# Patient Record
Sex: FEMALE | Race: WHITE | HISPANIC OR LATINO
[De-identification: names, ages, dates, MRNs, and addresses within clinical notes are randomized per-mention and may not be internally consistent; named-entity substitution may affect disease eponyms.]

---

## 2019-05-02 ENCOUNTER — APPOINTMENT (OUTPATIENT)
Dept: GERIATRICS | Facility: CLINIC | Age: 79
End: 2019-05-02
Payer: MEDICARE

## 2019-05-02 VITALS
HEART RATE: 83 BPM | HEIGHT: 60 IN | BODY MASS INDEX: 38.48 KG/M2 | OXYGEN SATURATION: 96 % | RESPIRATION RATE: 15 BRPM | WEIGHT: 196 LBS | DIASTOLIC BLOOD PRESSURE: 78 MMHG | SYSTOLIC BLOOD PRESSURE: 140 MMHG | TEMPERATURE: 97.6 F

## 2019-05-02 DIAGNOSIS — Z87.891 PERSONAL HISTORY OF NICOTINE DEPENDENCE: ICD-10-CM

## 2019-05-02 DIAGNOSIS — Z85.038 PERSONAL HISTORY OF OTHER MALIGNANT NEOPLASM OF LARGE INTESTINE: ICD-10-CM

## 2019-05-02 DIAGNOSIS — I27.20 PULMONARY HYPERTENSION, UNSPECIFIED: ICD-10-CM

## 2019-05-02 DIAGNOSIS — Z63.4 DISAPPEARANCE AND DEATH OF FAMILY MEMBER: ICD-10-CM

## 2019-05-02 PROCEDURE — 99204 OFFICE O/P NEW MOD 45 MIN: CPT

## 2019-05-02 SDOH — SOCIAL STABILITY - SOCIAL INSECURITY: DISSAPEARANCE AND DEATH OF FAMILY MEMBER: Z63.4

## 2019-05-02 NOTE — SOCIAL HISTORY
[No falls in past year] : Patient reported no falls in the past year [Fully functional (bathing, dressing, toileting, transferring, walking, feeding)] : Fully functional (bathing, dressing, toileting, transferring, walking, feeding) [Fully functional (using the telephone, shopping, preparing meals, housekeeping, doing laundry, using transportation,] : Fully functional and needs no help or supervision to perform IADLs (using the telephone, shopping, preparing meals, housekeeping, doing laundry, using transportation, managing medications and managing finances) [Smoke Detector] : smoke detector [Seat Belt] :  uses seat belt

## 2019-05-02 NOTE — PHYSICAL EXAM
[General Appearance - Alert] : alert [General Appearance - Well Developed] : well developed [General Appearance - In No Acute Distress] : in no acute distress [General Appearance - Well Nourished] : well nourished [General Appearance - Well-Appearing] : healthy appearing [Sclera] : the sclera and conjunctiva were normal [Extraocular Movements] : extraocular movements were intact [PERRL With Normal Accommodation] : pupils were equal in size, round, and reactive to light [No Oral Pallor] : no oral pallor [Normal Oral Mucosa] : normal oral mucosa [Hearing Threshold Finger Rub Not [place]] : hearing was normal [No Oral Cyanosis] : no oral cyanosis [Outer Ear] : the ears and nose were normal in appearance [Nasal Cavity] : the nasal mucosa and septum were normal [Both Tympanic Membranes Were Examined] : both tympanic membranes were normal [Oropharynx] : The oropharynx was normal [Jugular Venous Distention Increased] : there was no jugular-venous distention [Neck Cervical Mass (___cm)] : no neck mass was observed [Auscultation Breath Sounds / Voice Sounds] : lungs were clear to auscultation bilaterally [Heart Sounds] : normal S1 and S2 [Full Pulse] : the pedal pulses are present [Edema] : there was no peripheral edema [Veins - Varicosity Changes] : there were no varicosital changes [Abdomen Soft] : soft [Bowel Sounds] : normal bowel sounds [Abdomen Tenderness] : non-tender [Abdomen Mass (___ Cm)] : no abdominal mass palpated [FreeTextEntry1] : midline incisions superior and inferior to umbilicus [Cervical Lymph Nodes Enlarged Posterior Bilaterally] : posterior cervical [Supraclavicular Lymph Nodes Enlarged Bilaterally] : supraclavicular [Cervical Lymph Nodes Enlarged Anterior Bilaterally] : anterior cervical [No CVA Tenderness] : no ~M costovertebral angle tenderness [Axillary Lymph Nodes Enlarged Bilaterally] : axillary [Motor Tone] : muscle strength and tone were normal [Involuntary Movements] : no involuntary movements were seen [No Spinal Tenderness] : no spinal tenderness [Right Foot Was Examined] : right foot was examined [] : no rash [Left Foot Was Examined] : left foot was examined [Normal Appearance] : not normal in appearance [Normal in Appearance] : not normal in appearance [Normal] : normal [Total Score ___ / 30] : the patient achieved a score of [unfilled] /30 [2+] : 2+ in the dorsalis pedis [Place ___ / 5] : place [unfilled] / 5 [Date / Time ___ / 5] : date / time [unfilled] / 5 [Registration ___ / 3] : registration [unfilled] / 3 [Serial Sevens ___/5] : serial sevens [unfilled] / 5 [Naming 2 Objects ___ / 2] : naming two objects [unfilled] / 2 [Repeating a Sentence ___ / 1] : repeating a sentence [unfilled] / 1 [Writing a Sentence ___ / 1] : write sentence [unfilled] / 1 [3-stage Verbal Command ___ / 3] : three-stage verbal command [unfilled] / 3 [Written Command ___ / 1] : written command [unfilled] / 1 [Copy a Design ___ / 1] : copy a design [unfilled] / 1 [Recall ___ / 3] : recall [unfilled] / 3 [Oriented To Time, Place, And Person] : oriented to person, place, and time

## 2019-05-02 NOTE — REVIEW OF SYSTEMS
[Feeling Poorly] : not feeling poorly [Fever] : no fever [Feeling Tired] : not feeling tired [Recent Weight Gain (___ Lbs)] : no recent weight gain [Recent Weight Loss (___ Lbs)] : no recent weight loss [Heart Rate Is Fast] : the heart rate was not fast [Heart Rate Is Slow] : the heart rate was not slow [Chest Pain] : no chest pain [Palpitations] : no palpitations [Suicidal] : not suicidal [Cough] : cough [Anxiety] : no anxiety [Sleep Disturbances] : no sleep disturbances [Depression] : depression [FreeTextEntry5] : negative stress test one year ago [FreeTextEntry6] : intermittent cough [Negative] : Heme/Lymph [de-identified] : Admits to feeling depressed at times [FreeTextEntry9] : Chronic back pain

## 2019-05-02 NOTE — HISTORY OF PRESENT ILLNESS
[FreeTextEntry1] : Mrs. Daya Simmons is a 78-year-old retired  presenting to [facility] care. She currently sees a cardiologist Dr. Polanco for primary care, although she denies a history of heart disease. The patient is  6 years. She lives in an apartment and about 3 years ago her daughter and adult grandchild moved in with her. The patient states her daughter has emotional problems and her presence is distressing.\par The patient is functionally independent, and drives a car. She has a history of diabetes, hypertension and hyperlipidemia. She had colon cancer surgery in 2017. She suffers from chronic back pain and is status post lumbar surgery approximately 20 years ago for presumed spinal stenosis. Her mobility is limited by back pain and although she does a lot of work around the home including cooking and cleaning, she admits to being inactive and not doing much walking outside the home.\par \par  [1] : 2) Feeling down, depressed, or hopeless for several days [0] : 1) Little interest or pleasure doing things: Not at all [PHQ-2 Score ___] : PHQ-2 Score [unfilled]

## 2019-05-03 LAB
ALBUMIN SERPL ELPH-MCNC: 4.5 G/DL
ALP BLD-CCNC: 81 U/L
ALT SERPL-CCNC: 8 U/L
ANION GAP SERPL CALC-SCNC: 11 MMOL/L
AST SERPL-CCNC: 13 U/L
BASOPHILS # BLD AUTO: 0.11 K/UL
BASOPHILS NFR BLD AUTO: 1.5 %
BILIRUB SERPL-MCNC: 0.2 MG/DL
BUN SERPL-MCNC: 37 MG/DL
CALCIUM SERPL-MCNC: 10.1 MG/DL
CHLORIDE SERPL-SCNC: 109 MMOL/L
CHOLEST SERPL-MCNC: 199 MG/DL
CHOLEST/HDLC SERPL: 3.4 RATIO
CO2 SERPL-SCNC: 23 MMOL/L
CREAT SERPL-MCNC: 1.36 MG/DL
CREAT SPEC-SCNC: 89 MG/DL
EOSINOPHIL # BLD AUTO: 0.16 K/UL
EOSINOPHIL NFR BLD AUTO: 2.2 %
ESTIMATED AVERAGE GLUCOSE: 148 MG/DL
FOLATE SERPL-MCNC: 4.3 NG/ML
GLUCOSE SERPL-MCNC: 111 MG/DL
HBA1C MFR BLD HPLC: 6.8 %
HCT VFR BLD CALC: 32.9 %
HDLC SERPL-MCNC: 59 MG/DL
HGB BLD-MCNC: 10.5 G/DL
IMM GRANULOCYTES NFR BLD AUTO: 0.1 %
IRON SATN MFR SERPL: 22 %
IRON SERPL-MCNC: 70 UG/DL
LDLC SERPL CALC-MCNC: 113 MG/DL
LYMPHOCYTES # BLD AUTO: 1.49 K/UL
LYMPHOCYTES NFR BLD AUTO: 20.8 %
MAN DIFF?: NORMAL
MCHC RBC-ENTMCNC: 29.8 PG
MCHC RBC-ENTMCNC: 31.9 GM/DL
MCV RBC AUTO: 93.5 FL
MICROALBUMIN 24H UR DL<=1MG/L-MCNC: 9.1 MG/DL
MICROALBUMIN/CREAT 24H UR-RTO: 102 MG/G
MONOCYTES # BLD AUTO: 0.56 K/UL
MONOCYTES NFR BLD AUTO: 7.8 %
NEUTROPHILS # BLD AUTO: 4.83 K/UL
NEUTROPHILS NFR BLD AUTO: 67.6 %
PLATELET # BLD AUTO: 358 K/UL
POTASSIUM SERPL-SCNC: 6.1 MMOL/L
PROT SERPL-MCNC: 7.4 G/DL
RBC # BLD: 3.52 M/UL
RBC # FLD: 12.5 %
SODIUM SERPL-SCNC: 143 MMOL/L
TIBC SERPL-MCNC: 318 UG/DL
TRIGL SERPL-MCNC: 135 MG/DL
UIBC SERPL-MCNC: 248 UG/DL
VIT B12 SERPL-MCNC: 555 PG/ML
WBC # FLD AUTO: 7.16 K/UL

## 2019-05-17 LAB
ANION GAP SERPL CALC-SCNC: 13 MMOL/L
BUN SERPL-MCNC: 36 MG/DL
CALCIUM SERPL-MCNC: 9.9 MG/DL
CHLORIDE SERPL-SCNC: 107 MMOL/L
CO2 SERPL-SCNC: 22 MMOL/L
CREAT SERPL-MCNC: 1.47 MG/DL
GLUCOSE SERPL-MCNC: 119 MG/DL
POTASSIUM SERPL-SCNC: 5.5 MMOL/L
SODIUM SERPL-SCNC: 141 MMOL/L

## 2019-05-28 ENCOUNTER — OUTPATIENT (OUTPATIENT)
Dept: OUTPATIENT SERVICES | Facility: HOSPITAL | Age: 79
LOS: 1 days | Discharge: HOME | End: 2019-05-28

## 2019-05-28 DIAGNOSIS — D64.9 ANEMIA, UNSPECIFIED: ICD-10-CM

## 2019-06-03 ENCOUNTER — RESULT REVIEW (OUTPATIENT)
Age: 79
End: 2019-06-03

## 2019-06-03 ENCOUNTER — APPOINTMENT (OUTPATIENT)
Dept: HEMATOLOGY ONCOLOGY | Facility: CLINIC | Age: 79
End: 2019-06-03
Payer: MEDICARE

## 2019-06-03 VITALS
HEIGHT: 59.88 IN | BODY MASS INDEX: 38.87 KG/M2 | WEIGHT: 197.98 LBS | SYSTOLIC BLOOD PRESSURE: 151 MMHG | OXYGEN SATURATION: 98 % | RESPIRATION RATE: 16 BRPM | DIASTOLIC BLOOD PRESSURE: 73 MMHG | TEMPERATURE: 97.4 F

## 2019-06-03 DIAGNOSIS — Z81.1 FAMILY HISTORY OF ALCOHOL ABUSE AND DEPENDENCE: ICD-10-CM

## 2019-06-03 LAB
ERYTHROCYTE [SEDIMENTATION RATE] IN BLOOD: 30 MM/HR — HIGH (ref 0–20)
HCT VFR BLD CALC: 29.2 % — LOW (ref 34.5–45)
HGB BLD-MCNC: 10.4 G/DL — LOW (ref 11.5–15.5)
MCHC RBC-ENTMCNC: 31.6 PG — SIGNIFICANT CHANGE UP (ref 27–34)
MCHC RBC-ENTMCNC: 35.6 G/DL — SIGNIFICANT CHANGE UP (ref 32–36)
MCV RBC AUTO: 88.8 FL — SIGNIFICANT CHANGE UP (ref 80–100)
PLATELET # BLD AUTO: 339 K/UL — SIGNIFICANT CHANGE UP (ref 150–400)
RBC # BLD: 3.29 M/UL — LOW (ref 3.8–5.2)
RBC # FLD: 11.2 % — SIGNIFICANT CHANGE UP (ref 10.3–14.5)
RETICS #: 37.1 K/UL — SIGNIFICANT CHANGE UP (ref 25–125)
RETICS/RBC NFR: 1.1 % — SIGNIFICANT CHANGE UP (ref 0.5–2.5)
WBC # BLD: 5.1 K/UL — SIGNIFICANT CHANGE UP (ref 3.8–10.5)
WBC # FLD AUTO: 5.1 K/UL — SIGNIFICANT CHANGE UP (ref 3.8–10.5)

## 2019-06-03 PROCEDURE — 99204 OFFICE O/P NEW MOD 45 MIN: CPT

## 2019-06-03 NOTE — REVIEW OF SYSTEMS
[Fatigue] : fatigue [SOB on Exertion] : shortness of breath during exertion [Negative] : Allergic/Immunologic [FreeTextEntry4] : allergies stuffy nose.   [FreeTextEntry6] : [place], can climb stairs.   [FreeTextEntry9] : Back pain.

## 2019-06-03 NOTE — ASSESSMENT
[FreeTextEntry1] : This is a 78 year old woman with a history of hypertension, diabetes, well controlled, hyperlipidemia. She presents for the evaluation of a normocytic anemia with Hg of 10.4g/dl.  This folic acid deficiency may be the cause, however will evaluate for other causes of anemia. CHeck LDH and haptoglobin r/o hemolysis, Check ESR, CRP, RF r/o inflammation. she has some amount of renal insufficiency, check erythropoietin.  \par Given the renal insufficiency r/o plasma cell dyscrasia check SPEP, AFSHIN, Free light chains.  \par She does have a history of a resected colon cancer, she does not recall the name of her oncologist and has not been seeing them for a while. She states that there was some discussion of a high cancer marker, check CA 19-9 and CEA.  \par Spent > 45 minutes in direct patient care and and addressed all questions and concerns.  >50% of this time was in direct face to face contact with patient during exam and counseling.

## 2019-06-03 NOTE — HISTORY OF PRESENT ILLNESS
[de-identified] : This is a 78 year old woman with a history of diabetes, hypertension and hyperlipidemia, she presents for the evaluation of a normocytic anemia Hg 10.5g/dl on most recent labwork. Normal iron and TIBC, and a low folate of 4.3g/dl.  Takes folic acid OTC daily already as per Dr. Bauer. \par Hx of colon cancer resected 2017.  Dr. Oneill 8/26/17 performed a colonoscopy partial colectomy.  No chemotherapy [id number].  \par Patient has been anemic for most of her life. Unclear how anemic, we have no records from prior to the May 2nd labwork from her primary.   Very tired.  \par Patient had a longstanding history of an anemia going back to her old primary.  Was never treated for this she states.   \par \par \par

## 2019-06-03 NOTE — REASON FOR VISIT
[Initial Consultation] : an initial consultation for [Blood Count Assessment] : blood count assessment [FreeTextEntry2] : Anemia

## 2019-06-05 ENCOUNTER — MESSAGE (OUTPATIENT)
Age: 79
End: 2019-06-05

## 2019-06-07 LAB
ALBUMIN MFR SERPL ELPH: 61.8 %
ALBUMIN SERPL-MCNC: 4.3 G/DL
ALBUMIN/GLOB SERPL: 1.7 RATIO
ALPHA1 GLOB MFR SERPL ELPH: 4.7 %
ALPHA1 GLOB SERPL ELPH-MCNC: 0.3 G/DL
ALPHA2 GLOB MFR SERPL ELPH: 10.6 %
ALPHA2 GLOB SERPL ELPH-MCNC: 0.7 G/DL
ANA SER IF-ACNC: NEGATIVE
B-GLOBULIN MFR SERPL ELPH: 11.8 %
B-GLOBULIN SERPL ELPH-MCNC: 0.8 G/DL
CANCER AG19-9 SERPL-ACNC: 46 U/ML
CEA SERPL-MCNC: 3.9 NG/ML
CRP SERPL-MCNC: 0.25 MG/DL
DEPRECATED KAPPA LC FREE/LAMBDA SER: 1.07 RATIO
EPO SERPL-MCNC: 11.4 MIU/ML
FERRITIN SERPL-MCNC: 127 NG/ML
GAMMA GLOB FLD ELPH-MCNC: 0.8 G/DL
GAMMA GLOB MFR SERPL ELPH: 11.1 %
HAPTOGLOB SERPL-MCNC: 141 MG/DL
INTERPRETATION SERPL IEP-IMP: NORMAL
IRON SATN MFR SERPL: 35 %
IRON SERPL-MCNC: 112 UG/DL
KAPPA LC CSF-MCNC: 2.89 MG/DL
KAPPA LC SERPL-MCNC: 3.1 MG/DL
LDH SERPL-CCNC: 154 U/L
M PROTEIN SPEC IFE-MCNC: NORMAL
PROT SERPL-MCNC: 6.9 G/DL
PROT SERPL-MCNC: 6.9 G/DL
RHEUMATOID FACT SER QL: <10 IU/ML
TIBC SERPL-MCNC: 318 UG/DL
UIBC SERPL-MCNC: 206 UG/DL

## 2019-06-17 ENCOUNTER — APPOINTMENT (OUTPATIENT)
Dept: HEMATOLOGY ONCOLOGY | Facility: CLINIC | Age: 79
End: 2019-06-17
Payer: MEDICARE

## 2019-06-17 VITALS
HEART RATE: 86 BPM | OXYGEN SATURATION: 99 % | RESPIRATION RATE: 16 BRPM | BODY MASS INDEX: 38.17 KG/M2 | SYSTOLIC BLOOD PRESSURE: 151 MMHG | DIASTOLIC BLOOD PRESSURE: 71 MMHG | TEMPERATURE: 98.8 F | WEIGHT: 194.67 LBS

## 2019-06-17 PROCEDURE — 99212 OFFICE O/P EST SF 10 MIN: CPT

## 2019-06-17 NOTE — HISTORY OF PRESENT ILLNESS
[de-identified] : Patient presents complaining of severe left hand pain.  Had this on and off for a while but got very bad last night.  She feels fine otherwise, denies faitgue or weakness.  \par On last visit we had conduced the anemia workup and most labs normal save for the oncgoing stable mild anemia Hg 10.4g/dl.  \par  [de-identified] : This is a 78 year old woman with a history of diabetes, hypertension and hyperlipidemia, she presents for the evaluation of a normocytic anemia Hg 10.5g/dl on most recent labwork. Normal iron and TIBC, and a low folate of 4.3g/dl.  Takes folic acid OTC daily already as per Dr. Bauer. \par Hx of colon cancer resected 2017.  Dr. Oneill 8/26/17 performed a colonoscopy partial colectomy.  No chemotherapy [id number].  \par \par \par \par

## 2019-06-17 NOTE — ASSESSMENT
[FreeTextEntry1] : This is a 78 year old woman with a history of hypertension, diabetes, well controlled, hyperlipidemia. She presents for the evaluation of a normocytic anemia with Hg of 10.4g/dl.  Workup was essentially normal, mild elevation in ESR but nothing specific, no evidence of plasma ell dyscrasia, severe inflammation, or iron deficiency.    Patient is on losartan that can occasionally cause a mild anemia, this may be the culprit, however she needs this medication right now so would not stop it.  Anemia remains stable and mild at 10.4g/dl, would follow over time, and in te future discuss a bone marrow biopsy or initiation of possible ANUP agents if the anemia worsens.

## 2019-06-20 ENCOUNTER — APPOINTMENT (OUTPATIENT)
Dept: OTOLARYNGOLOGY | Facility: CLINIC | Age: 79
End: 2019-06-20

## 2019-07-18 ENCOUNTER — APPOINTMENT (OUTPATIENT)
Dept: GERIATRICS | Facility: CLINIC | Age: 79
End: 2019-07-18
Payer: MEDICARE

## 2019-07-18 VITALS
HEIGHT: 55 IN | HEART RATE: 78 BPM | SYSTOLIC BLOOD PRESSURE: 150 MMHG | WEIGHT: 198 LBS | DIASTOLIC BLOOD PRESSURE: 70 MMHG | BODY MASS INDEX: 45.82 KG/M2 | OXYGEN SATURATION: 97 % | RESPIRATION RATE: 16 BRPM

## 2019-07-18 PROCEDURE — 99214 OFFICE O/P EST MOD 30 MIN: CPT

## 2019-07-18 NOTE — HISTORY OF PRESENT ILLNESS
[FreeTextEntry1] : Mrs. Simmons presents for follow up. On June 28 she saw nephrology who referred her to the [facility] ED with a potassium of 6.9. She had an overnight stay in the ED and was discharged with different medications which the patient did not bring with her. She was advised to followup with Dr. Young but declined f/u visit and requests other nephrologist. The patient feels otherwise well. She denies headaches, dizziness, palpitations, chest pain, shortness of breath, edema.\par Since her last visit she is also following with hematology for anemia. Mild elevations in CEA and CA 19-9 noted. She has refused CT scan as advised.\par

## 2019-07-18 NOTE — PHYSICAL EXAM
[General Appearance - Alert] : alert [General Appearance - In No Acute Distress] : in no acute distress [General Appearance - Well Nourished] : well nourished [General Appearance - Well Developed] : well developed [General Appearance - Well-Appearing] : healthy appearing [Sclera] : the sclera and conjunctiva were normal [PERRL With Normal Accommodation] : pupils were equal in size, round, and reactive to light [Normal Oral Mucosa] : normal oral mucosa [Neck Cervical Mass (___cm)] : no neck mass was observed [Jugular Venous Distention Increased] : there was no jugular-venous distention [Auscultation Breath Sounds / Voice Sounds] : lungs were clear to auscultation bilaterally [Heart Sounds] : normal S1 and S2 [Edema] : there was no peripheral edema [Bowel Sounds] : normal bowel sounds [Abdomen Soft] : soft [Abdomen Tenderness] : non-tender [Cervical Lymph Nodes Enlarged Posterior Bilaterally] : posterior cervical [Cervical Lymph Nodes Enlarged Anterior Bilaterally] : anterior cervical [Supraclavicular Lymph Nodes Enlarged Bilaterally] : supraclavicular [Axillary Lymph Nodes Enlarged Bilaterally] : axillary [Abnormal Walk] : normal gait [Involuntary Movements] : no involuntary movements were seen [] : no rash [No Focal Deficits] : no focal deficits

## 2019-07-19 LAB
ANION GAP SERPL CALC-SCNC: 13 MMOL/L
BUN SERPL-MCNC: 32 MG/DL
CALCIUM SERPL-MCNC: 9.7 MG/DL
CHLORIDE SERPL-SCNC: 108 MMOL/L
CHOLEST SERPL-MCNC: 144 MG/DL
CHOLEST/HDLC SERPL: 2.9 RATIO
CO2 SERPL-SCNC: 21 MMOL/L
CREAT SERPL-MCNC: 1.35 MG/DL
ESTIMATED AVERAGE GLUCOSE: 123 MG/DL
GLUCOSE SERPL-MCNC: 85 MG/DL
HBA1C MFR BLD HPLC: 5.9 %
HDLC SERPL-MCNC: 49 MG/DL
LDLC SERPL CALC-MCNC: 67 MG/DL
POTASSIUM SERPL-SCNC: 5.6 MMOL/L
SODIUM SERPL-SCNC: 142 MMOL/L
TRIGL SERPL-MCNC: 138 MG/DL

## 2019-08-01 ENCOUNTER — APPOINTMENT (OUTPATIENT)
Dept: GERIATRICS | Facility: CLINIC | Age: 79
End: 2019-08-01
Payer: MEDICARE

## 2019-08-01 VITALS
RESPIRATION RATE: 17 BRPM | DIASTOLIC BLOOD PRESSURE: 72 MMHG | OXYGEN SATURATION: 98 % | HEIGHT: 55 IN | BODY MASS INDEX: 45.68 KG/M2 | TEMPERATURE: 98.7 F | HEART RATE: 75 BPM | WEIGHT: 197.4 LBS | SYSTOLIC BLOOD PRESSURE: 156 MMHG

## 2019-08-01 PROCEDURE — 99214 OFFICE O/P EST MOD 30 MIN: CPT

## 2019-08-01 RX ORDER — GEMFIBROZIL 600 MG/1
600 TABLET, FILM COATED ORAL TWICE DAILY
Qty: 60 | Refills: 5 | Status: DISCONTINUED | COMMUNITY
Start: 2019-05-02 | End: 2019-08-01

## 2019-08-01 RX ORDER — ROSUVASTATIN CALCIUM 10 MG/1
10 TABLET, FILM COATED ORAL
Refills: 0 | Status: DISCONTINUED | COMMUNITY
Start: 2019-05-02 | End: 2019-08-01

## 2019-08-01 RX ORDER — LOSARTAN POTASSIUM AND HYDROCHLOROTHIAZIDE 12.5; 1 MG/1; MG/1
100-12.5 TABLET ORAL EVERY OTHER DAY
Qty: 15 | Refills: 3 | Status: DISCONTINUED | COMMUNITY
Start: 2019-05-02 | End: 2019-08-01

## 2019-08-01 NOTE — PHYSICAL EXAM
[General Appearance - Well-Appearing] : healthy appearing [General Appearance - In No Acute Distress] : in no acute distress [General Appearance - Alert] : alert [PERRL With Normal Accommodation] : pupils were equal in size, round, and reactive to light [Extraocular Movements] : extraocular movements were intact [Sclera] : the sclera and conjunctiva were normal [Neck Appearance] : the appearance of the neck was normal [Jugular Venous Distention Increased] : there was no jugular-venous distention [Heart Sounds] : normal S1 and S2 [Auscultation Breath Sounds / Voice Sounds] : lungs were clear to auscultation bilaterally [FreeTextEntry1] : trace left pretibial edema [Abdomen Soft] : soft [Bowel Sounds] : normal bowel sounds [Abdomen Tenderness] : non-tender [Cervical Lymph Nodes Enlarged Posterior Bilaterally] : posterior cervical [Supraclavicular Lymph Nodes Enlarged Bilaterally] : supraclavicular [Cervical Lymph Nodes Enlarged Anterior Bilaterally] : anterior cervical [Axillary Lymph Nodes Enlarged Bilaterally] : axillary [No CVA Tenderness] : no ~M costovertebral angle tenderness [No Spinal Tenderness] : no spinal tenderness [Nail Clubbing] : no clubbing  or cyanosis of the fingernails [Involuntary Movements] : no involuntary movements were seen [No Focal Deficits] : no focal deficits [] : no rash [Motor Tone] : muscle strength and tone were normal

## 2019-08-01 NOTE — HISTORY OF PRESENT ILLNESS
[FreeTextEntry1] : Mrs. who presents for followup. Overall she feels well. She has been experiencing chronic no back pain occasionally radiating down her right leg. She feels otherwise well. She has not been experiencing any lightheadedness/dizziness, excessive thirst or polyuria. She's been compliant with her blood pressure medications. Meds reconciled at today's visit.

## 2019-08-02 ENCOUNTER — APPOINTMENT (OUTPATIENT)
Dept: OTOLARYNGOLOGY | Facility: CLINIC | Age: 79
End: 2019-08-02

## 2019-08-28 ENCOUNTER — APPOINTMENT (OUTPATIENT)
Dept: OTOLARYNGOLOGY | Facility: CLINIC | Age: 79
End: 2019-08-28
Payer: MEDICARE

## 2019-08-28 VITALS
DIASTOLIC BLOOD PRESSURE: 75 MMHG | WEIGHT: 197 LBS | SYSTOLIC BLOOD PRESSURE: 154 MMHG | HEIGHT: 59 IN | BODY MASS INDEX: 39.72 KG/M2 | HEART RATE: 73 BPM

## 2019-08-28 DIAGNOSIS — R13.10 DYSPHAGIA, UNSPECIFIED: ICD-10-CM

## 2019-08-28 PROCEDURE — 31575 DIAGNOSTIC LARYNGOSCOPY: CPT

## 2019-08-28 PROCEDURE — 99204 OFFICE O/P NEW MOD 45 MIN: CPT | Mod: 25

## 2019-08-29 ENCOUNTER — APPOINTMENT (OUTPATIENT)
Dept: GERIATRICS | Facility: CLINIC | Age: 79
End: 2019-08-29
Payer: MEDICARE

## 2019-08-29 ENCOUNTER — NON-APPOINTMENT (OUTPATIENT)
Age: 79
End: 2019-08-29

## 2019-08-29 ENCOUNTER — APPOINTMENT (OUTPATIENT)
Dept: CARDIOLOGY | Facility: CLINIC | Age: 79
End: 2019-08-29
Payer: MEDICARE

## 2019-08-29 VITALS
HEART RATE: 76 BPM | TEMPERATURE: 97.8 F | HEIGHT: 59 IN | SYSTOLIC BLOOD PRESSURE: 130 MMHG | OXYGEN SATURATION: 98 % | WEIGHT: 193.6 LBS | DIASTOLIC BLOOD PRESSURE: 60 MMHG | BODY MASS INDEX: 39.03 KG/M2

## 2019-08-29 VITALS
WEIGHT: 194.38 LBS | RESPIRATION RATE: 14 BRPM | HEIGHT: 59 IN | DIASTOLIC BLOOD PRESSURE: 80 MMHG | OXYGEN SATURATION: 99 % | BODY MASS INDEX: 39.19 KG/M2 | SYSTOLIC BLOOD PRESSURE: 142 MMHG | HEART RATE: 70 BPM | TEMPERATURE: 98.1 F

## 2019-08-29 LAB
ANION GAP SERPL CALC-SCNC: 12 MMOL/L
BUN SERPL-MCNC: 23 MG/DL
CALCIUM SERPL-MCNC: 9.7 MG/DL
CHLORIDE SERPL-SCNC: 107 MMOL/L
CO2 SERPL-SCNC: 23 MMOL/L
CREAT SERPL-MCNC: 1.18 MG/DL
GLUCOSE SERPL-MCNC: 129 MG/DL
POTASSIUM SERPL-SCNC: 4.6 MMOL/L
SODIUM SERPL-SCNC: 142 MMOL/L

## 2019-08-29 PROCEDURE — 93000 ELECTROCARDIOGRAM COMPLETE: CPT

## 2019-08-29 PROCEDURE — 99214 OFFICE O/P EST MOD 30 MIN: CPT

## 2019-08-29 PROCEDURE — 99215 OFFICE O/P EST HI 40 MIN: CPT | Mod: GC,25

## 2019-08-29 RX ORDER — BENZONATATE 100 MG/1
100 CAPSULE ORAL 3 TIMES DAILY
Refills: 0 | Status: DISCONTINUED | COMMUNITY
Start: 2019-05-02 | End: 2019-08-29

## 2019-08-29 NOTE — HISTORY OF PRESENT ILLNESS
[FreeTextEntry1] : 78 year old female presents for f/u hypertension. \par \par Patient reports still being hypertensive at doctor's visit later; per VS documentation BP at otolaryngologist was 154/80. She reports she also knew her blood pressure was high because her chest "felt funny", denies chest pain or radiation of the sensation; she may have had palpitations?; denies associated diaphoresis, headache, dizziness, dyspnea. Reports having similar sensation of chest "feeling funny" two days ago as well. Yesterday, patient took an extra dose of prescribed norvasc 10mg dose for a total of norvasc 20mg due to her chest feeling funny. Denies checking blood pressures at home because blood pressure machine is broken. Reports she has had increased lower extremity edema since norvasc increase to 10mg at last visit. \par \par She reports having had "diarrhea" with about 1-3 bowel movements per day; she bought Tagamet OTC which has been helping. Denies fever/ chills, abdominal pain, dysuria, urinary urgency/ frequency. \par \par She reports having been told that glimperide is decreased to 1mg. \par Per patient, she had labs drawn this AM prior to coming to office today. \par Patient reports having followed with a nephrologist, but stopped following them as that nephrologist was not part of [facility]. Per patient, she has a new nephrologist with [facility] appointment scheduled for September 13th.\par Also reports difficulty with making appointment to have new oncologist at [facility]; as she does not want to follow with Dr. Caceres.

## 2019-08-29 NOTE — REVIEW OF SYSTEMS
[Feeling Tired] : feeling tired [As Noted in HPI] : as noted in HPI [Lower Ext Edema] : lower extremity edema [Diarrhea] : diarrhea [Negative] : Neurological [Fever] : no fever [Chills] : no chills [Abdominal Pain] : no abdominal pain [Vomiting] : no vomiting [FreeTextEntry4] : dysphagia and hoarseness. followed up with ENT yesterday [FreeTextEntry5] : chest feels funny

## 2019-08-29 NOTE — PHYSICAL EXAM
[General Appearance - Alert] : alert [General Appearance - In No Acute Distress] : in no acute distress [Sclera] : the sclera and conjunctiva were normal [Extraocular Movements] : extraocular movements were intact [Normal Oral Mucosa] : normal oral mucosa [Outer Ear] : the ears and nose were normal in appearance [Hearing Threshold Finger Rub Not [place]] : hearing was normal [Neck Appearance] : the appearance of the neck was normal [Jugular Venous Distention Increased] : there was no jugular-venous distention [Respiration, Rhythm And Depth] : normal respiratory rhythm and effort [Exaggerated Use Of Accessory Muscles For Inspiration] : no accessory muscle use [Auscultation Breath Sounds / Voice Sounds] : lungs were clear to auscultation bilaterally [Heart Rate And Rhythm] : heart rate was normal and rhythm regular [Heart Sounds] : normal S1 and S2 [Bowel Sounds] : normal bowel sounds [Abdomen Soft] : soft [Abdomen Tenderness] : non-tender [Cervical Lymph Nodes Enlarged Posterior Bilaterally] : posterior cervical [Cervical Lymph Nodes Enlarged Anterior Bilaterally] : anterior cervical [No CVA Tenderness] : no ~M costovertebral angle tenderness [No Spinal Tenderness] : no spinal tenderness [Abnormal Walk] : normal gait [Nail Clubbing] : no clubbing  or cyanosis of the fingernails [Involuntary Movements] : no involuntary movements were seen [Musculoskeletal - Swelling] : no joint swelling seen [Motor Tone] : muscle strength and tone were normal [Cranial Nerves] : cranial nerves 2-12 were intact [No Focal Deficits] : no focal deficits [] : no rash [FreeTextEntry1] : trace to slight pitting edema in b/l lower extremities

## 2019-08-29 NOTE — ADDENDUM
[FreeTextEntry1] : Mrs. Simmons was seen with Dr. Esteban, geriatric fellow. Detailed history, exam and plan as per fellow's note. Briefly this is a 78-year-old diabetic hypertensive woman presenting for urgent followup for hypertension. For the past 2 days she's been experiencing a "funny" sensation in her chest and yesterday took additional dose of Norvasc. The patient is not clear about the onset or timing of the symptoms but thinks that she may have had associated palpitations but does not describe chest pain, diaphoresis or shortness of breath. Electrocardiogram reveals poor R-wave progression V1 V2 with inverted T waves, changed from old prior EKG. This patient has multiple risk factors for heart disease including hypertension, diabetes and hyperlipidemia, and I have asked cardiology to evaluate her this morning. The patient has a followup appointment with me next week I have asked her to come back and see me so that we may address remaining issues.\par I have also emphasized in great detail the need for her to followup with nephrology and heme-onc.

## 2019-08-30 ENCOUNTER — APPOINTMENT (OUTPATIENT)
Dept: CARDIOLOGY | Facility: CLINIC | Age: 79
End: 2019-08-30
Payer: MEDICARE

## 2019-08-30 PROBLEM — R13.10 DIFFICULTY SWALLOWING: Status: ACTIVE | Noted: 2019-05-02

## 2019-08-30 PROCEDURE — 93306 TTE W/DOPPLER COMPLETE: CPT

## 2019-08-30 PROCEDURE — 78452 HT MUSCLE IMAGE SPECT MULT: CPT

## 2019-08-30 PROCEDURE — A9500: CPT

## 2019-08-30 PROCEDURE — 93015 CV STRESS TEST SUPVJ I&R: CPT

## 2019-08-30 NOTE — PROCEDURE
[de-identified] : Afrin 0.05% and lidocaine 4% sprayed into both nasal passages. Flexible scope #2 used. Passed through nasal passage and nasopharynx/oropharynx/hypopharynx clear. Supraglottis normal. Glottis with fully mobile vocal cords without lesions or masses. Visualized subglottis clear. Postcricoid area without erythema or edema. No pooling of secretions.

## 2019-08-30 NOTE — HISTORY OF PRESENT ILLNESS
[de-identified] : 77yo female with difficulty swallowing for about a year. She notes that about 1-2 weeks she feels it gets stuck in her chest. She notes that drinking water will push it down. She denies vomiting or regurgitating food. She also notes intermittent hoarseness. She notes no heartburn. Hx of colon cancer 10/2017 treated with surgery and no adjuvant therapy. She is clear as far as she knows. She has had colonoscopy but no EGD.

## 2019-08-30 NOTE — CONSULT LETTER
[Dear  ___] : Dear  [unfilled], [Consult Letter:] : I had the pleasure of evaluating your patient, [unfilled]. [Please see my note below.] : Please see my note below. [Consult Closing:] : Thank you very much for allowing me to participate in the care of this patient.  If you have any questions, please do not hesitate to contact me. [FreeTextEntry3] : Elicia Hollis MD\par Otolaryngology and Cranial Base Surgery\par Attending Physician - Department of Otolaryngology and Head & Neck Surgery\par [facility]\par \par [employer] [facility]  [Sincerely,] : Sincerely,

## 2019-08-30 NOTE — ASSESSMENT
[FreeTextEntry1] : dysphagia:\par - normal laryngoscopy and she notes sensation of food getting stuck in chest so suspect thoracic esophageal issue so will plan for barium esophagram\par - even if normal will plan for referral to GI for consideration of upper endoscopy\par - will call with esophagram results

## 2019-09-03 ENCOUNTER — APPOINTMENT (OUTPATIENT)
Dept: GERIATRICS | Facility: CLINIC | Age: 79
End: 2019-09-03
Payer: MEDICARE

## 2019-09-03 VITALS — SYSTOLIC BLOOD PRESSURE: 128 MMHG | DIASTOLIC BLOOD PRESSURE: 80 MMHG

## 2019-09-03 VITALS
SYSTOLIC BLOOD PRESSURE: 122 MMHG | HEIGHT: 59 IN | HEART RATE: 76 BPM | BODY MASS INDEX: 39.51 KG/M2 | TEMPERATURE: 98.6 F | WEIGHT: 196 LBS | RESPIRATION RATE: 15 BRPM | DIASTOLIC BLOOD PRESSURE: 70 MMHG | OXYGEN SATURATION: 96 %

## 2019-09-03 PROCEDURE — 99214 OFFICE O/P EST MOD 30 MIN: CPT

## 2019-09-03 NOTE — REVIEW OF SYSTEMS
[Lower Ext Edema] : lower extremity edema [Negative] : Heme/Lymph [Leg Claudication] : no intermittent leg claudication [Arthralgias] : no arthralgias [Joint Pain] : no joint pain [Joint Swelling] : no joint swelling [Joint Stiffness] : no joint stiffness [Suicidal] : not suicidal [Sleep Disturbances] : no sleep disturbances

## 2019-09-03 NOTE — PHYSICAL EXAM
[General Appearance - In No Acute Distress] : in no acute distress [General Appearance - Alert] : alert [General Appearance - Well Nourished] : well nourished [PERRL With Normal Accommodation] : pupils were equal in size, round, and reactive to light [General Appearance - Well Developed] : well developed [Jugular Venous Distention Increased] : there was no jugular-venous distention [Auscultation Breath Sounds / Voice Sounds] : lungs were clear to auscultation bilaterally [Heart Rate And Rhythm] : heart rate was normal and rhythm regular [Heart Sounds] : normal S1 and S2 [Full Pulse] : the pedal pulses are present [Abdomen Soft] : soft [Abdomen Tenderness] : non-tender [Cervical Lymph Nodes Enlarged Posterior Bilaterally] : posterior cervical [Axillary Lymph Nodes Enlarged Bilaterally] : axillary [Cervical Lymph Nodes Enlarged Anterior Bilaterally] : anterior cervical [Supraclavicular Lymph Nodes Enlarged Bilaterally] : supraclavicular [No CVA Tenderness] : no ~M costovertebral angle tenderness [No Spinal Tenderness] : no spinal tenderness [Involuntary Movements] : no involuntary movements were seen [] : no rash [No Focal Deficits] : no focal deficits [FreeTextEntry1] : Trace pretibial edema bilaterally.

## 2019-09-03 NOTE — HISTORY OF PRESENT ILLNESS
[FreeTextEntry1] : Mrs. Simmons presents for followup of hypertension.She underwent stress testing last week, detailed results pending. She has not experienced anymore palpitations or funny sensation in her chest. Only complaint is swelling in her feet which tends to worsen towards the end of the day. She denies shortness of breath. She admits to using salt at home.\par The patient admits to leg pain. She has a history of spinal stenosis and is receiving outpatient physical therapy. She sees a neurologist Dr. Rogel and had been multilevel MRI performed in the past year. Results are in the chart.

## 2019-09-13 ENCOUNTER — APPOINTMENT (OUTPATIENT)
Dept: NEPHROLOGY | Facility: CLINIC | Age: 79
End: 2019-09-13
Payer: MEDICARE

## 2019-09-13 VITALS
SYSTOLIC BLOOD PRESSURE: 154 MMHG | DIASTOLIC BLOOD PRESSURE: 73 MMHG | WEIGHT: 189.59 LBS | OXYGEN SATURATION: 97 % | HEART RATE: 75 BPM | BODY MASS INDEX: 38.22 KG/M2 | HEIGHT: 59 IN

## 2019-09-13 PROCEDURE — 99204 OFFICE O/P NEW MOD 45 MIN: CPT

## 2019-09-13 RX ORDER — GLIMEPIRIDE 1 MG/1
1 TABLET ORAL DAILY
Qty: 90 | Refills: 3 | Status: DISCONTINUED | COMMUNITY
Start: 2019-05-02 | End: 2019-09-13

## 2019-09-13 NOTE — HISTORY OF PRESENT ILLNESS
[FreeTextEntry1] : This is a 78 year old with hypertension, type 2 diabetes (HgbA1c 5.9%), CKD (in May Cr was 1.47, 1.35 in July), hyperlipidemia, and colon adenocarcinoma (2017) who presents to establish care for CKD. Patient was recently admitted to [facility] in June 2019 after being sent in by her nephrologist (Dr. Kim of [facility]) for hyperkalemia to 6.9. She was initially referred to Dr. Kim by her PCP Dr. Bauer for elevated potassium to 5.6 in July. She remained admitted in the hospital for 1 day and repeat bloodwork on August 29th revealed normal K (4.6). Patient has been on ARB in the past which resulted in increase in Cr and ARB was subsequently discontinued.\par \par Blood pressure is elevated to 154/73. Patient took her BP medication last night. Patient occasionally checks her BPs at home and systolic BPs are typically in 130s. Patient reports bilateral lower extremity edema. She was increased to 10 mg amlodipine in May and believes her leg swelling worsened since then. She endorses prior to increasing amlodipine her swelling was primarily limited to her left leg. She denies excessive salt in her diet. She reports some increase in exercise and some loss of weight.\par \par Today, she endorses diarrhea for the last 2-3 weeks. She endorses 3-4 episodes of loose stools per day which sometimes wakes her at night. She endorses abdominal discomfort with improvement after bowel movement. She denies blood in her stool, no recent travel, no recent sick contacts.

## 2019-09-13 NOTE — PHYSICAL EXAM
[General Appearance - Alert] : alert [Sclera] : the sclera and conjunctiva were normal [General Appearance - In No Acute Distress] : in no acute distress [Extraocular Movements] : extraocular movements were intact [PERRL With Normal Accommodation] : pupils were equal in size, round, and reactive to light [Neck Cervical Mass (___cm)] : no neck mass was observed [Neck Appearance] : the appearance of the neck was normal [Jugular Venous Distention Increased] : there was no jugular-venous distention [Auscultation Breath Sounds / Voice Sounds] : lungs were clear to auscultation bilaterally [Heart Sounds] : normal S1 and S2 [Heart Rate And Rhythm] : heart rate was normal and rhythm regular [Heart Sounds Gallop] : no gallops [Murmurs] : no murmurs [Pitting Edema] : pitting edema present [Heart Sounds Pericardial Friction Rub] : no pericardial rub [___ +] : bilateral [unfilled]+ pretibial pitting edema [Abdomen Soft] : soft [Bowel Sounds] : normal bowel sounds [Abdomen Tenderness] : non-tender [Abdomen Mass (___ Cm)] : no abdominal mass palpated [Antalgic Gait Bilateral] : antalgic bilaterally [Skin Color & Pigmentation] : normal skin color and pigmentation [Skin Turgor] : normal skin turgor [] : no rash [Oriented To Time, Place, And Person] : oriented to person, place, and time [Impaired Insight] : insight and judgment were intact [Affect] : the affect was normal

## 2019-09-13 NOTE — ASSESSMENT
[FreeTextEntry1] : 78 year old with hypertension, type 2 diabetes (HgbA1c 5.9%), CKD stage 3 (in May Cr was 1.47, 1.35 in July), hyperlipidemia, and colon adenocarcinoma (2017) who presents for initial evaluation of CKD and management of hyperkalemia.\par \par CKD stage 3 - patient with baseline Cr 1.3-1.4 this past year likely secondary to T2DM and hypertension. Past testing reveals mild proteinuria. Recommend starting 10mg lisinopril/25 mg chlorthalidone for long term kidney protection ( will stop amlodipine) Check urinalysis and repeat blood work today. Recommend renal ultrasound.\par \par Hyperkalemia - last bloodwork in August 2019 with normal potassium levels. Prior to hospitalization, patient found to have hyperkalemia to 6.9. Will repeat blood work today. will start low dose ACEI in combination with a diuretic and if need be, she can be started on Lokelma 10 mg BID for K binding. \par \par Lower extremity edema - encouraged low salt diet and continued exercise. Amlodipine likely contributing to YANG as well. Recommend the above changes.\par \par discussed with Dr. Bauer\par \par Hypertension - currently elevated to 154/73, however patient endorses systolic BPs 130s at home. Recommend discontinuing amlodipine and starting 10 mg lisinopril/20 mg chlorthalidone for BP control. Encouraged continued exercise and low salt diet.\par \par T2DM - currently well controlled on metformin and glimepiride. Last HgbA1c decreased to 5.9%. Continue management per Dr. Bauer.\par \par HLD - currently well controlled, on rosuvastatin with LDL 67, Total cholesterol 144.

## 2019-09-26 ENCOUNTER — RX RENEWAL (OUTPATIENT)
Age: 79
End: 2019-09-26

## 2019-10-15 ENCOUNTER — OUTPATIENT (OUTPATIENT)
Dept: OUTPATIENT SERVICES | Facility: HOSPITAL | Age: 79
LOS: 1 days | Discharge: HOME | End: 2019-10-15

## 2019-10-15 DIAGNOSIS — D64.9 ANEMIA, UNSPECIFIED: ICD-10-CM

## 2019-10-17 ENCOUNTER — APPOINTMENT (OUTPATIENT)
Dept: HEMATOLOGY ONCOLOGY | Facility: CLINIC | Age: 79
End: 2019-10-17

## 2019-10-22 ENCOUNTER — FORM ENCOUNTER (OUTPATIENT)
Age: 79
End: 2019-10-22

## 2019-10-22 ENCOUNTER — APPOINTMENT (OUTPATIENT)
Dept: GERIATRICS | Facility: CLINIC | Age: 79
End: 2019-10-22
Payer: MEDICARE

## 2019-10-22 VITALS
DIASTOLIC BLOOD PRESSURE: 70 MMHG | TEMPERATURE: 98.2 F | HEART RATE: 81 BPM | HEIGHT: 59 IN | RESPIRATION RATE: 16 BRPM | OXYGEN SATURATION: 99 % | BODY MASS INDEX: 38.78 KG/M2 | SYSTOLIC BLOOD PRESSURE: 136 MMHG | WEIGHT: 192.38 LBS

## 2019-10-22 PROCEDURE — 90670 PCV13 VACCINE IM: CPT

## 2019-10-22 PROCEDURE — G0008: CPT

## 2019-10-22 PROCEDURE — G0009: CPT

## 2019-10-22 PROCEDURE — 90662 IIV NO PRSV INCREASED AG IM: CPT

## 2019-10-22 PROCEDURE — 99214 OFFICE O/P EST MOD 30 MIN: CPT | Mod: 25

## 2019-10-22 NOTE — HISTORY OF PRESENT ILLNESS
[FreeTextEntry1] : Mrs. Simmons is a 79-year-old diabetic hypertensive woman presenting for follow up. The patient feels well. She does not complain of any difficulty breathing, chest pain, palpitations, leg swelling. Her main complaint is discomfort she has had on and off for quite some time. She states that she cannot sleep on her right side. She denies any associated numbness/paresthesias or weakness.\par Patient denies any recent falls. She remains functionally independent.

## 2019-10-22 NOTE — PHYSICAL EXAM
[General Appearance - Alert] : alert [General Appearance - In No Acute Distress] : in no acute distress [General Appearance - Well Nourished] : well nourished [General Appearance - Well Developed] : well developed [General Appearance - Well-Appearing] : healthy appearing [Sclera] : the sclera and conjunctiva were normal [PERRL With Normal Accommodation] : pupils were equal in size, round, and reactive to light [Strabismus] : no strabismus was seen [Extraocular Movements] : extraocular movements were intact [Outer Ear] : the ears and nose were normal in appearance [Normal Oral Mucosa] : normal oral mucosa [Neck Cervical Mass (___cm)] : no neck mass was observed [Jugular Venous Distention Increased] : there was no jugular-venous distention [Auscultation Breath Sounds / Voice Sounds] : lungs were clear to auscultation bilaterally [Heart Sounds] : normal S1 and S2 [Abdomen Soft] : soft [Abdomen Tenderness] : non-tender [Abdomen Mass (___ Cm)] : no abdominal mass palpated [Cervical Lymph Nodes Enlarged Posterior Bilaterally] : posterior cervical [Supraclavicular Lymph Nodes Enlarged Bilaterally] : supraclavicular [Cervical Lymph Nodes Enlarged Anterior Bilaterally] : anterior cervical [Axillary Lymph Nodes Enlarged Bilaterally] : axillary [No Focal Deficits] : no focal deficits [] : no rash [FreeTextEntry1] : moderate palp tenderness right a.c.joint

## 2019-10-22 NOTE — REVIEW OF SYSTEMS
[Dysuria] : no dysuria [Incontinence] : no incontinence [Joint Pain] : joint pain [Negative] : Heme/Lymph [FreeTextEntry9] : right shoulder pain

## 2019-10-23 ENCOUNTER — OUTPATIENT (OUTPATIENT)
Dept: OUTPATIENT SERVICES | Facility: HOSPITAL | Age: 79
LOS: 1 days | End: 2019-10-23
Payer: MEDICARE

## 2019-10-23 ENCOUNTER — APPOINTMENT (OUTPATIENT)
Dept: ULTRASOUND IMAGING | Facility: IMAGING CENTER | Age: 79
End: 2019-10-23
Payer: MEDICARE

## 2019-10-23 DIAGNOSIS — N18.3 CHRONIC KIDNEY DISEASE, STAGE 3 (MODERATE): ICD-10-CM

## 2019-10-23 PROCEDURE — 76770 US EXAM ABDO BACK WALL COMP: CPT | Mod: 26

## 2019-10-23 PROCEDURE — 76770 US EXAM ABDO BACK WALL COMP: CPT

## 2019-11-01 ENCOUNTER — APPOINTMENT (OUTPATIENT)
Dept: NEPHROLOGY | Facility: CLINIC | Age: 79
End: 2019-11-01
Payer: MEDICARE

## 2019-11-01 VITALS
DIASTOLIC BLOOD PRESSURE: 59 MMHG | OXYGEN SATURATION: 98 % | BODY MASS INDEX: 37.85 KG/M2 | HEART RATE: 74 BPM | WEIGHT: 187.39 LBS | SYSTOLIC BLOOD PRESSURE: 142 MMHG

## 2019-11-01 PROCEDURE — 99215 OFFICE O/P EST HI 40 MIN: CPT

## 2019-11-20 ENCOUNTER — APPOINTMENT (OUTPATIENT)
Dept: GERIATRICS | Facility: CLINIC | Age: 79
End: 2019-11-20
Payer: MEDICARE

## 2019-11-20 VITALS
BODY MASS INDEX: 38.38 KG/M2 | DIASTOLIC BLOOD PRESSURE: 60 MMHG | SYSTOLIC BLOOD PRESSURE: 142 MMHG | TEMPERATURE: 97.9 F | OXYGEN SATURATION: 98 % | HEART RATE: 75 BPM | RESPIRATION RATE: 16 BRPM | WEIGHT: 190.4 LBS | HEIGHT: 59 IN

## 2019-11-20 PROCEDURE — 99214 OFFICE O/P EST MOD 30 MIN: CPT

## 2019-11-20 NOTE — PHYSICAL EXAM
[General Appearance - Alert] : alert [General Appearance - In No Acute Distress] : in no acute distress [General Appearance - Well Nourished] : well nourished [General Appearance - Well-Appearing] : healthy appearing [General Appearance - Well Developed] : well developed [Sclera] : the sclera and conjunctiva were normal [Extraocular Movements] : extraocular movements were intact [Strabismus] : no strabismus was seen [PERRL With Normal Accommodation] : pupils were equal in size, round, and reactive to light [Normal Oral Mucosa] : normal oral mucosa [No Oral Pallor] : no oral pallor [No Oral Cyanosis] : no oral cyanosis [Nasal Cavity] : the nasal mucosa and septum were normal [Oropharynx] : The oropharynx was normal [Neck Cervical Mass (___cm)] : no neck mass was observed [Jugular Venous Distention Increased] : there was no jugular-venous distention [Thyroid Diffuse Enlargement] : the thyroid was not enlarged [Thyroid Nodule] : there were no palpable thyroid nodules [Respiration, Rhythm And Depth] : normal respiratory rhythm and effort [Exaggerated Use Of Accessory Muscles For Inspiration] : no accessory muscle use [Auscultation Breath Sounds / Voice Sounds] : lungs were clear to auscultation bilaterally [Heart Sounds] : normal S1 and S2 [Cervical Lymph Nodes Enlarged Posterior Bilaterally] : posterior cervical [Supraclavicular Lymph Nodes Enlarged Bilaterally] : supraclavicular [Cervical Lymph Nodes Enlarged Anterior Bilaterally] : anterior cervical [Axillary Lymph Nodes Enlarged Bilaterally] : axillary [No CVA Tenderness] : no ~M costovertebral angle tenderness [Involuntary Movements] : no involuntary movements were seen [No Spinal Tenderness] : no spinal tenderness [Motor Tone] : muscle strength and tone were normal [No Focal Deficits] : no focal deficits [FreeTextEntry1] : tr-1+ ankle edema [Abdomen Soft] : soft [Abdomen Tenderness] : non-tender [] : no hepato-splenomegaly

## 2019-11-20 NOTE — HISTORY OF PRESENT ILLNESS
[FreeTextEntry1] : Mrs. Simmons presents for followup. She states that every year about this time she develops a persistent dry cough, worse at night. She denies fever, chest discomfort, sputum production, shortness of breath. She has seen a pulmonologist a few times in the past and has had pulmonary function studies but was never told she had asthma or prescribed an inhaler. She feels otherwise well. She notes some edema in her legs which she states worsens as the day progresses. She stopped chlorthalidone a few weeks ago because "it wasn't doing anything"\par She has seen ENT for complaint of dysfunction in the past but has not performed imaging study is recommended. She is scheduled to see GI Dr Wilkerson for colon cancer followup and possible EGD. She will also be following up with him on Dr. Gtz who she had seen in the past. Note long history of chronic back pain. Patient states the pain is severe at times and has used Vicodin infrequently in the past and asking for a prescription. She states that she only uses with severe pain and mostly at night to help her sleep.\par

## 2019-11-20 NOTE — REVIEW OF SYSTEMS
[Lower Ext Edema] : lower extremity edema [As Noted in HPI] : as noted in HPI [Negative] : Heme/Lymph [Fever] : no fever [Feeling Poorly] : not feeling poorly [Recent Weight Gain (___ Lbs)] : no recent weight gain [Feeling Tired] : not feeling tired [Recent Weight Loss (___ Lbs)] : no recent weight loss [Shortness Of Breath] : no shortness of breath [Wheezing] : no wheezing [Orthopnea] : no orthopnea [SOB on Exertion] : no shortness of breath during exertion [PND] : no PND

## 2020-01-02 ENCOUNTER — MEDICATION RENEWAL (OUTPATIENT)
Age: 80
End: 2020-01-02

## 2020-01-08 ENCOUNTER — MEDICATION RENEWAL (OUTPATIENT)
Age: 80
End: 2020-01-08

## 2020-01-21 ENCOUNTER — APPOINTMENT (OUTPATIENT)
Dept: GERIATRICS | Facility: CLINIC | Age: 80
End: 2020-01-21
Payer: MEDICARE

## 2020-01-21 VITALS
WEIGHT: 192 LBS | RESPIRATION RATE: 16 BRPM | OXYGEN SATURATION: 98 % | HEART RATE: 80 BPM | TEMPERATURE: 98.1 F | HEIGHT: 59 IN | SYSTOLIC BLOOD PRESSURE: 136 MMHG | BODY MASS INDEX: 38.71 KG/M2 | DIASTOLIC BLOOD PRESSURE: 60 MMHG

## 2020-01-21 PROCEDURE — 99214 OFFICE O/P EST MOD 30 MIN: CPT

## 2020-01-21 NOTE — HISTORY OF PRESENT ILLNESS
[FreeTextEntry1] : Mrs. Simmons presents for followup of multiple medical problems. The patient feels achy but overall well. Patient has chronic low back pain with radiation RLE>LLE, unchanged. She's using only Tylenol daily and rarely Tylenol with Codeine. Does not wish to add any other meds to her regimen. Chronic dry cough seems better and using Tessalon sparingly. Also notes ankle edema which tends to worsen over the course of the day. No complaints of chest pain, palpitations or shortness of breath with modest effort. Saw Dr. Gtz-hematology oncology-earlier this month, we'll lab and consult requested and in chart. K4.6/creatinine 1.2/hemoglobin 9.3. Scheduled for EGD and colonoscopy by Dr. Wilkerson next month. Denies change in bowel or bladder habits. Drinks adequate amounts of water. Remains functionally independent.

## 2020-01-21 NOTE — PHYSICAL EXAM
[General Appearance - In No Acute Distress] : in no acute distress [General Appearance - Alert] : alert [General Appearance - Well-Appearing] : healthy appearing [Sclera] : the sclera and conjunctiva were normal [PERRL With Normal Accommodation] : pupils were equal in size, round, and reactive to light [Examination Of The Oral Cavity] : the lips and gums were normal [Normal Oral Mucosa] : normal oral mucosa [Both Tympanic Membranes Were Examined] : both tympanic membranes were normal [Oropharynx] : The oropharynx was normal [Jugular Venous Distention Increased] : there was no jugular-venous distention [Auscultation Breath Sounds / Voice Sounds] : lungs were clear to auscultation bilaterally [Neck Cervical Mass (___cm)] : no neck mass was observed [Heart Sounds] : normal S1 and S2 [Bowel Sounds] : normal bowel sounds [FreeTextEntry1] : Trace to 1+bilateral ankle edema. [Abdomen Tenderness] : non-tender [Abdomen Soft] : soft [Axillary Lymph Nodes Enlarged Bilaterally] : axillary [Cervical Lymph Nodes Enlarged Posterior Bilaterally] : posterior cervical [Supraclavicular Lymph Nodes Enlarged Bilaterally] : supraclavicular [Cervical Lymph Nodes Enlarged Anterior Bilaterally] : anterior cervical [No CVA Tenderness] : no ~M costovertebral angle tenderness [No Spinal Tenderness] : no spinal tenderness [] : no rash [Involuntary Movements] : no involuntary movements were seen [No Focal Deficits] : no focal deficits

## 2020-02-21 LAB
ANION GAP SERPL CALC-SCNC: 12 MMOL/L
BUN SERPL-MCNC: 40 MG/DL
CALCIUM SERPL-MCNC: 9.3 MG/DL
CHLORIDE SERPL-SCNC: 108 MMOL/L
CO2 SERPL-SCNC: 21 MMOL/L
CREAT SERPL-MCNC: 1.42 MG/DL
ESTIMATED AVERAGE GLUCOSE: 126 MG/DL
GLUCOSE SERPL-MCNC: 106 MG/DL
HBA1C MFR BLD HPLC: 6 %
POTASSIUM SERPL-SCNC: 5.7 MMOL/L
SODIUM SERPL-SCNC: 140 MMOL/L

## 2020-03-03 NOTE — HISTORY OF PRESENT ILLNESS
[FreeTextEntry1] : follow up ckd- 78 year old with hypertension, type 2 diabetes (HgbA1c 5.9%), CKD (in May Cr was 1.47, 1.35 in July), hyperlipidemia, and colon adenocarcinoma (2017) who presents to establish care for CKD. Patient was recently admitted to [facility] in June 2019 after being sent in by her nephrologist (Dr. Kim of [facility]) for hyperkalemia to 6.9. She was initially referred to Dr. Kim by her PCP Dr. Bauer for elevated potassium to 5.6 in July. She remained admitted in the hospital for 1 day and repeat bloodwork on August 29th revealed normal K (4.6). Patient has been on ARB in the past which resulted in increase in Cr and ARB was subsequently discontinued.\par \par today there are no acute complaints. \par \par \par \par

## 2020-03-03 NOTE — ASSESSMENT
[FreeTextEntry1] : 78 year old with hypertension, type 2 diabetes (HgbA1c 5.9%), CKD stage 3 (in May Cr was 1.47, 1.35 in July), hyperlipidemia, and colon adenocarcinoma (2017) who presents for initial evaluation of CKD and management of hyperkalemia.\par \par CKD stage 3 - patient with baseline Cr 1.3-1.4 this past year likely secondary to T2DM and hypertension. Past testing reveals mild proteinuria. Recommend starting 10mg lisinopril/25 mg chlorthalidone for long term kidney protection ( will stop amlodipine) \par \par Hyperkalemia - last bloodwork in August 2019 with normal potassium levels. Prior to hospitalization, patient found to have hyperkalemia to 6.9. Will repeat blood work today. will start low dose ACEI in combination with a diuretic and if need be, she can be started on Lokelma 10 mg BID for K binding. \par \par Lower extremity edema - encouraged low salt diet and continued exercise. Amlodipine likely contributing to YANG as well. Recommend the above changes.\par \par T2DM - currently well controlled on metformin and glimepiride. Last HgbA1c decreased to 5.9%. Continue management per Dr. Bauer.\par \par HLD - currently well controlled, on rosuvastatin with LDL 67, Total cholesterol 144. \par \par Hypertension - as above

## 2020-03-03 NOTE — PHYSICAL EXAM
[General Appearance - Alert] : alert [General Appearance - Well Nourished] : well nourished [General Appearance - In No Acute Distress] : in no acute distress [Sclera] : the sclera and conjunctiva were normal [General Appearance - Well Developed] : well developed [PERRL With Normal Accommodation] : pupils were equal in size, round, and reactive to light [Hearing Threshold Finger Rub Not [place]] : hearing was normal [Outer Ear] : the ears and nose were normal in appearance [Examination Of The Oral Cavity] : the lips and gums were normal [Neck Appearance] : the appearance of the neck was normal [Oropharynx] : the oropharynx was normal [Neck Cervical Mass (___cm)] : no neck mass was observed [Jugular Venous Distention Increased] : there was no jugular-venous distention [Exaggerated Use Of Accessory Muscles For Inspiration] : no accessory muscle use [Respiration, Rhythm And Depth] : normal respiratory rhythm and effort [Auscultation Breath Sounds / Voice Sounds] : lungs were clear to auscultation bilaterally [Heart Rate And Rhythm] : heart rate was normal and rhythm regular [Heart Sounds] : normal S1 and S2 [Abdomen Soft] : soft [Murmurs] : no murmurs [Bowel Sounds] : normal bowel sounds [Abdomen Tenderness] : non-tender [No CVA Tenderness] : no ~M costovertebral angle tenderness [Nail Clubbing] : no clubbing  or cyanosis of the fingernails [No Spinal Tenderness] : no spinal tenderness [Abnormal Walk] : normal gait [Musculoskeletal - Swelling] : no joint swelling seen [Motor Tone] : muscle strength and tone were normal [Skin Color & Pigmentation] : normal skin color and pigmentation [Skin Turgor] : normal skin turgor [Cranial Nerves] : cranial nerves 2-12 were intact [Skin Lesions] : no skin lesions [] : no rash [Deep Tendon Reflexes (DTR)] : deep tendon reflexes were 2+ and symmetric [Sensation] : the sensory exam was normal to light touch and pinprick [Motor Exam] : the motor exam was normal [Oriented To Time, Place, And Person] : oriented to person, place, and time [Affect] : the affect was normal [Impaired Insight] : insight and judgment were intact [Mood] : the mood was normal

## 2020-03-06 ENCOUNTER — APPOINTMENT (OUTPATIENT)
Dept: NEPHROLOGY | Facility: CLINIC | Age: 80
End: 2020-03-06
Payer: MEDICARE

## 2020-03-06 VITALS
BODY MASS INDEX: 38.74 KG/M2 | DIASTOLIC BLOOD PRESSURE: 58 MMHG | OXYGEN SATURATION: 98 % | SYSTOLIC BLOOD PRESSURE: 148 MMHG | WEIGHT: 191.8 LBS | HEART RATE: 74 BPM

## 2020-03-06 DIAGNOSIS — T30.0 BURN OF UNSPECIFIED BODY REGION, UNSPECIFIED DEGREE: ICD-10-CM

## 2020-03-06 PROCEDURE — 99215 OFFICE O/P EST HI 40 MIN: CPT

## 2020-03-06 NOTE — PHYSICAL EXAM
[General Appearance - Alert] : alert [General Appearance - In No Acute Distress] : in no acute distress [General Appearance - Well Nourished] : well nourished [General Appearance - Well Developed] : well developed [General Appearance - Well-Appearing] : healthy appearing [Sclera] : the sclera and conjunctiva were normal [PERRL With Normal Accommodation] : pupils were equal in size, round, and reactive to light [Outer Ear] : the ears and nose were normal in appearance [Examination Of The Oral Cavity] : the lips and gums were normal [Oropharynx] : the oropharynx was normal [Neck Appearance] : the appearance of the neck was normal [Neck Cervical Mass (___cm)] : no neck mass was observed [Jugular Venous Distention Increased] : there was no jugular-venous distention [Respiration, Rhythm And Depth] : normal respiratory rhythm and effort [Exaggerated Use Of Accessory Muscles For Inspiration] : no accessory muscle use [Auscultation Breath Sounds / Voice Sounds] : lungs were clear to auscultation bilaterally [Heart Rate And Rhythm] : heart rate was normal and rhythm regular [Heart Sounds] : normal S1 and S2 [Heart Sounds Gallop] : no gallops [Arterial Pulses Carotid] : carotid pulses were normal with no bruits [Edema] : there was no peripheral edema [Bowel Sounds] : normal bowel sounds [Abdomen Soft] : soft [Abdomen Tenderness] : non-tender [No CVA Tenderness] : no ~M costovertebral angle tenderness [No Spinal Tenderness] : no spinal tenderness [Abnormal Walk] : normal gait [Nail Clubbing] : no clubbing  or cyanosis of the fingernails [Musculoskeletal - Swelling] : no joint swelling seen [Motor Tone] : muscle strength and tone were normal [Skin Color & Pigmentation] : normal skin color and pigmentation [Skin Turgor] : normal skin turgor [] : no rash [Skin Lesions] : no skin lesions [Cranial Nerves] : cranial nerves 2-12 were intact [Deep Tendon Reflexes (DTR)] : deep tendon reflexes were 2+ and symmetric [Sensation] : the sensory exam was normal to light touch and pinprick [Motor Exam] : the motor exam was normal [Oriented To Time, Place, And Person] : oriented to person, place, and time [Impaired Insight] : insight and judgment were intact [Affect] : the affect was normal [Mood] : the mood was normal

## 2020-03-06 NOTE — ASSESSMENT
[FreeTextEntry1] : \par 79 year old lady with hypertension, type 2 diabetes (HgbA1c 5.9%), CKD stage 3, hyperlipidemia, and colon adenocarcinoma (2017) \par \par CKD stage 3 - patient with baseline Cr 1.3-1.4 this past year likely secondary to T2DM and hypertension. Past testing reveals mild proteinuria. Started 10mg lisinopril/25 mg chlorthalidone for long term kidney protection ( will stop amlodipine). Last creat 2/21- 1.42/ BUN- 40/k- 5.7. Advised to hydrate herself better( her BUN has been worsening). Repeat labs today. \par \par Hyperkalemia- repeat labs. HYdrate. would like to continue Lisinopril and add Lokelma if necessary. continue Chlorthalidone.

## 2020-04-21 ENCOUNTER — APPOINTMENT (OUTPATIENT)
Dept: GERIATRICS | Facility: CLINIC | Age: 80
End: 2020-04-21

## 2020-05-29 ENCOUNTER — APPOINTMENT (OUTPATIENT)
Dept: GERIATRICS | Facility: CLINIC | Age: 80
End: 2020-05-29
Payer: MEDICARE

## 2020-05-29 VITALS
WEIGHT: 189 LBS | HEART RATE: 93 BPM | BODY MASS INDEX: 38.1 KG/M2 | TEMPERATURE: 98.4 F | SYSTOLIC BLOOD PRESSURE: 146 MMHG | HEIGHT: 59 IN | OXYGEN SATURATION: 98 % | RESPIRATION RATE: 17 BRPM | DIASTOLIC BLOOD PRESSURE: 72 MMHG

## 2020-05-29 DIAGNOSIS — J30.2 OTHER SEASONAL ALLERGIC RHINITIS: ICD-10-CM

## 2020-05-29 DIAGNOSIS — R05 COUGH: ICD-10-CM

## 2020-05-29 PROCEDURE — 99214 OFFICE O/P EST MOD 30 MIN: CPT

## 2020-05-29 RX ORDER — BENZONATATE 200 MG/1
200 CAPSULE ORAL 3 TIMES DAILY
Qty: 90 | Refills: 3 | Status: DISCONTINUED | COMMUNITY
Start: 2019-11-20 | End: 2020-05-29

## 2020-05-29 NOTE — PHYSICAL EXAM
[General Appearance - Alert] : alert [Hearing Threshold Finger Rub Not [place]] : hearing was normal [Sclera] : the sclera and conjunctiva were normal [Neck Appearance] : the appearance of the neck was normal [] : no respiratory distress [Respiration, Rhythm And Depth] : normal respiratory rhythm and effort [Auscultation Breath Sounds / Voice Sounds] : lungs were clear to auscultation bilaterally [Exaggerated Use Of Accessory Muscles For Inspiration] : no accessory muscle use [Heart Sounds] : normal S1 and S2 [Heart Rate And Rhythm] : heart rate was normal and rhythm regular [Involuntary Movements] : no involuntary movements were seen [Abnormal Walk] : normal gait [Nail Clubbing] : no clubbing  or cyanosis of the fingernails [Musculoskeletal - Swelling] : no joint swelling seen [Motor Tone] : muscle strength and tone were normal [Skin Color & Pigmentation] : normal skin color and pigmentation [Affect] : the affect was normal [Mood] : the mood was normal [FreeTextEntry1] : trace ankle edema

## 2020-05-29 NOTE — HISTORY OF PRESENT ILLNESS
[FreeTextEntry1] : 79yoF with pmhx of dm2, chronic low back pain, ckd3, htn, edema of ankles, seen for acute visit for cough.\par \par notes she has had this same cough every year. associated with runny nose and sneezing and itchy throat.\par denies assocaited fever or sob.  no sick contacts. \par reports she no longer is taking pantoprazole, flonase or zyrtec.\par \par low back pain chronic:  inhibits her sleep at times.  also with right shoulder pain as well.\par states she needs refill on the medication\par

## 2020-05-29 NOTE — REVIEW OF SYSTEMS
[Eyes Itch] : itching of the eyes [Dry Eyes] : dryness of the eyes [Nasal Discharge] : nasal discharge [Lower Ext Edema] : lower extremity edema [Hoarseness] : hoarseness [Cough] : cough [As Noted in HPI] : as noted in HPI [Negative] : Psychiatric [Fever] : no fever [Chills] : no chills [Nosebleeds] : no nosebleeds [Shortness Of Breath] : no shortness of breath

## 2020-06-19 ENCOUNTER — APPOINTMENT (OUTPATIENT)
Dept: NEPHROLOGY | Facility: CLINIC | Age: 80
End: 2020-06-19

## 2020-07-17 ENCOUNTER — APPOINTMENT (OUTPATIENT)
Dept: NEPHROLOGY | Facility: CLINIC | Age: 80
End: 2020-07-17
Payer: MEDICARE

## 2020-07-17 VITALS
OXYGEN SATURATION: 98 % | BODY MASS INDEX: 37.85 KG/M2 | HEART RATE: 87 BPM | DIASTOLIC BLOOD PRESSURE: 83 MMHG | SYSTOLIC BLOOD PRESSURE: 126 MMHG | WEIGHT: 187.39 LBS

## 2020-07-17 PROCEDURE — 99215 OFFICE O/P EST HI 40 MIN: CPT

## 2020-07-17 NOTE — HISTORY OF PRESENT ILLNESS
[FreeTextEntry1] : Follow up CKD \par \par 80 yo lady with hypertension, type 2 diabetes (HgbA1c 5.9%), CKD 3, hyperlipidemia, and colon adenocarcinoma (2017) \par \par admitted to [facility] in June 2019 after being sent in by her nephrologist (Dr. Kim of [facility]) for hyperkalemia to 6.9. She was initially referred to Dr. Kim by her PCP Dr. Bauer for elevated potassium to 5.6 in July. She remained admitted in the hospital for 1 day and repeat bloodwork on August 29th revealed normal K (4.6). Patient has been on ARB in the past which resulted in increase in Cr and ARB was subsequently discontinued at the time. \par \par I started her on Chlorthalidone and K had some improvement, however overall she has been in the 5.5-5.7 range. she is currently on a low dose of Lisinopril 10 mg daily. she was advised to start Lokelma but she wanted her labs to be repeated. \par \par ROS \par  - No changes in urination, no blood in urine \par CVS- No chest pain, no shortness of breath \par all other systems reviewed in detail and were negative except as above\par \par

## 2020-07-17 NOTE — ASSESSMENT
[FreeTextEntry1] : \par 79 year old lady with hypertension, type 2 diabetes (HgbA1c 5.9%), CKD stage 3, hyperlipidemia, and colon adenocarcinoma (2017) \par \par CKD stage 3 - patient with baseline Cr 1.3-1.4 this past year likely secondary to T2DM and hypertension. Past testing reveals mild proteinuria. Started 10 mg lisinopril/25 mg chlorthalidone for long term kidney protection ( stopped amlodipine). Creatinine stable. \par \par Hyperkalemia- K was high. She did not take Lokelma. repeat labs today. stop lisinopril for now. start sodium bicarb 650 mg BID. will likely need Lokelma.

## 2020-08-03 ENCOUNTER — APPOINTMENT (OUTPATIENT)
Dept: NEPHROLOGY | Facility: CLINIC | Age: 80
End: 2020-08-03
Payer: MEDICARE

## 2020-08-03 VITALS
WEIGHT: 187.39 LBS | DIASTOLIC BLOOD PRESSURE: 66 MMHG | SYSTOLIC BLOOD PRESSURE: 143 MMHG | HEART RATE: 77 BPM | BODY MASS INDEX: 37.78 KG/M2 | HEIGHT: 59 IN | OXYGEN SATURATION: 98 %

## 2020-08-03 PROCEDURE — 99215 OFFICE O/P EST HI 40 MIN: CPT

## 2020-08-03 RX ORDER — LISINOPRIL 10 MG/1
10 TABLET ORAL DAILY
Qty: 30 | Refills: 0 | Status: DISCONTINUED | COMMUNITY
Start: 2019-09-13 | End: 2020-08-03

## 2020-08-03 NOTE — PHYSICAL EXAM
[General Appearance - In No Acute Distress] : in no acute distress [General Appearance - Alert] : alert [General Appearance - Well-Appearing] : healthy appearing [General Appearance - Well Nourished] : well nourished [General Appearance - Well Developed] : well developed [Outer Ear] : the ears and nose were normal in appearance [Sclera] : the sclera and conjunctiva were normal [PERRL With Normal Accommodation] : pupils were equal in size, round, and reactive to light [Neck Appearance] : the appearance of the neck was normal [Examination Of The Oral Cavity] : the lips and gums were normal [Oropharynx] : the oropharynx was normal [Neck Cervical Mass (___cm)] : no neck mass was observed [Jugular Venous Distention Increased] : there was no jugular-venous distention [Exaggerated Use Of Accessory Muscles For Inspiration] : no accessory muscle use [Respiration, Rhythm And Depth] : normal respiratory rhythm and effort [Heart Rate And Rhythm] : heart rate was normal and rhythm regular [Auscultation Breath Sounds / Voice Sounds] : lungs were clear to auscultation bilaterally [Heart Sounds] : normal S1 and S2 [Heart Sounds Gallop] : no gallops [Arterial Pulses Carotid] : carotid pulses were normal with no bruits [Edema] : there was no peripheral edema [Bowel Sounds] : normal bowel sounds [Abdomen Tenderness] : non-tender [Abdomen Soft] : soft [No CVA Tenderness] : no ~M costovertebral angle tenderness [Abnormal Walk] : normal gait [Nail Clubbing] : no clubbing  or cyanosis of the fingernails [No Spinal Tenderness] : no spinal tenderness [Motor Tone] : muscle strength and tone were normal [Musculoskeletal - Swelling] : no joint swelling seen [Skin Turgor] : normal skin turgor [Skin Color & Pigmentation] : normal skin color and pigmentation [] : no rash [Skin Lesions] : no skin lesions [Cranial Nerves] : cranial nerves 2-12 were intact [Motor Exam] : the motor exam was normal [Sensation] : the sensory exam was normal to light touch and pinprick [Deep Tendon Reflexes (DTR)] : deep tendon reflexes were 2+ and symmetric [Impaired Insight] : insight and judgment were intact [Oriented To Time, Place, And Person] : oriented to person, place, and time [Affect] : the affect was normal [Mood] : the mood was normal

## 2020-08-03 NOTE — HISTORY OF PRESENT ILLNESS
[FreeTextEntry1] : Follow up CKD \par \par 80 yo lady with hypertension, type 2 diabetes (HgbA1c 5.9%), CKD 3, hyperlipidemia, and colon adenocarcinoma (2017) \par \par admitted to [facility] in June 2019 after being sent in by her nephrologist (Dr. Kim of [facility]) for hyperkalemia to 6.9. She was initially referred to Dr. Kim by her PCP Dr. Bauer for elevated potassium to 5.6 in July. She remained admitted in the hospital for 1 day and repeat bloodwork on August 29th revealed normal K (4.6). Patient has been on ARB in the past which resulted in increase in Cr and ARB was subsequently discontinued at the time. \par \par I started her on Chlorthalidone and K had some improvement, however overall she has been in the 5.5-5.7 range. she did not want to start Lokelma. Last K on 7/17 was 5.9 and I asked her to stop Lisinopril and start Lokelma. \par \par ROS \par  - No changes in urination, no blood in urine \par CVS- No chest pain, no shortness of breath \par Integumentary- ++ leg pain and back pain \par all other systems reviewed in detail and were negative except as above \par \par

## 2020-08-03 NOTE — ASSESSMENT
[FreeTextEntry1] : \par 79 year old lady with hypertension, type 2 diabetes (HgbA1c 5.9%), CKD stage 3, hyperlipidemia, and colon adenocarcinoma (2017) \par \par CKD stage 3 - patient with baseline Cr 1.3-1.4 this past year likely secondary to T2DM and hypertension. Past testing reveals mild proteinuria. Started 10 mg lisinopril/25 mg chlorthalidone for long term kidney protection ( will stop amlodipine). she is off Lisinopril now since her K was ~6.0 and she was unwilling to take Lokelma. Last creat 1.2 mg/d; \par \par Hyperkalemia- she was unable to tolerate Lokelma ( nausea/ vomiting). check bmp and plasma potassium today. off lisinopril for now

## 2020-08-04 LAB
25(OH)D3 SERPL-MCNC: 30.4 NG/ML
25(OH)D3 SERPL-MCNC: 33.6 NG/ML
ALBUMIN SERPL ELPH-MCNC: 4.3 G/DL
ALBUMIN SERPL ELPH-MCNC: 4.5 G/DL
ANION GAP SERPL CALC-SCNC: 10 MMOL/L
ANION GAP SERPL CALC-SCNC: 11 MMOL/L
ANION GAP SERPL CALC-SCNC: 14 MMOL/L
APPEARANCE: CLEAR
BACTERIA: NEGATIVE
BASOPHILS # BLD AUTO: 0.08 K/UL
BASOPHILS # BLD AUTO: 0.1 K/UL
BASOPHILS NFR BLD AUTO: 1.4 %
BASOPHILS NFR BLD AUTO: 1.8 %
BILIRUBIN URINE: NEGATIVE
BLOOD URINE: NEGATIVE
BUN SERPL-MCNC: 20 MG/DL
BUN SERPL-MCNC: 28 MG/DL
BUN SERPL-MCNC: 30 MG/DL
BUN SERPL-MCNC: 33 MG/DL
BUN SERPL-MCNC: 35 MG/DL
CALCIUM SERPL-MCNC: 9.6 MG/DL
CALCIUM SERPL-MCNC: 9.6 MG/DL
CALCIUM SERPL-MCNC: 9.8 MG/DL
CALCIUM SERPL-MCNC: 9.8 MG/DL
CALCIUM SERPL-MCNC: 9.9 MG/DL
CHLORIDE SERPL-SCNC: 106 MMOL/L
CHLORIDE SERPL-SCNC: 107 MMOL/L
CHLORIDE SERPL-SCNC: 107 MMOL/L
CHLORIDE SERPL-SCNC: 111 MMOL/L
CHLORIDE SERPL-SCNC: 113 MMOL/L
CO2 SERPL-SCNC: 19 MMOL/L
CO2 SERPL-SCNC: 20 MMOL/L
CO2 SERPL-SCNC: 21 MMOL/L
CO2 SERPL-SCNC: 23 MMOL/L
CO2 SERPL-SCNC: 24 MMOL/L
COLOR: NORMAL
COLOR: NORMAL
COLOR: YELLOW
CREAT SERPL-MCNC: 1.12 MG/DL
CREAT SERPL-MCNC: 1.15 MG/DL
CREAT SERPL-MCNC: 1.17 MG/DL
CREAT SERPL-MCNC: 1.2 MG/DL
CREAT SERPL-MCNC: 1.29 MG/DL
CREAT SPEC-SCNC: 128 MG/DL
CREAT SPEC-SCNC: 58 MG/DL
CREAT SPEC-SCNC: 65 MG/DL
CREAT/PROT UR: 0.2 RATIO
CREAT/PROT UR: 0.5 RATIO
CREAT/PROT UR: 0.5 RATIO
EOSINOPHIL # BLD AUTO: 0.14 K/UL
EOSINOPHIL # BLD AUTO: 0.15 K/UL
EOSINOPHIL NFR BLD AUTO: 2.5 %
EOSINOPHIL NFR BLD AUTO: 2.6 %
ERYTHROCYTE [SEDIMENTATION RATE] IN BLOOD BY WESTERGREN METHOD: 25 MM/HR
FERRITIN SERPL-MCNC: 73 NG/ML
FERRITIN SERPL-MCNC: 80 NG/ML
GLUCOSE QUALITATIVE U: NEGATIVE
GLUCOSE SERPL-MCNC: 103 MG/DL
GLUCOSE SERPL-MCNC: 110 MG/DL
GLUCOSE SERPL-MCNC: 125 MG/DL
GLUCOSE SERPL-MCNC: 99 MG/DL
GLUCOSE SERPL-MCNC: 99 MG/DL
HBV SURFACE AG SER QL: NONREACTIVE
HCT VFR BLD CALC: 29.2 %
HCT VFR BLD CALC: 31.3 %
HCV AB SER QL: NONREACTIVE
HCV S/CO RATIO: 0.12 S/CO
HGB BLD-MCNC: 9.3 G/DL
HGB BLD-MCNC: 9.5 G/DL
HYALINE CASTS: 1 /LPF
IMM GRANULOCYTES NFR BLD AUTO: 0.2 %
IMM GRANULOCYTES NFR BLD AUTO: 0.2 %
IRON SATN MFR SERPL: 15 %
IRON SATN MFR SERPL: 17 %
IRON SERPL-MCNC: 43 UG/DL
IRON SERPL-MCNC: 44 UG/DL
KETONES URINE: NEGATIVE
LEUKOCYTE ESTERASE URINE: ABNORMAL
LEUKOCYTE ESTERASE URINE: NEGATIVE
LEUKOCYTE ESTERASE URINE: NEGATIVE
LYMPHOCYTES # BLD AUTO: 1.14 K/UL
LYMPHOCYTES # BLD AUTO: 1.4 K/UL
LYMPHOCYTES NFR BLD AUTO: 19.5 %
LYMPHOCYTES NFR BLD AUTO: 24.9 %
MAN DIFF?: NORMAL
MAN DIFF?: NORMAL
MCHC RBC-ENTMCNC: 29.1 PG
MCHC RBC-ENTMCNC: 29.8 PG
MCHC RBC-ENTMCNC: 30.4 GM/DL
MCHC RBC-ENTMCNC: 31.8 GM/DL
MCV RBC AUTO: 93.6 FL
MCV RBC AUTO: 96 FL
MICROSCOPIC-UA: NORMAL
MONOCYTES # BLD AUTO: 0.45 K/UL
MONOCYTES # BLD AUTO: 0.49 K/UL
MONOCYTES NFR BLD AUTO: 7.7 %
MONOCYTES NFR BLD AUTO: 8.7 %
NEUTROPHILS # BLD AUTO: 3.49 K/UL
NEUTROPHILS # BLD AUTO: 4.01 K/UL
NEUTROPHILS NFR BLD AUTO: 61.9 %
NEUTROPHILS NFR BLD AUTO: 68.6 %
NITRITE URINE: NEGATIVE
PH URINE: 5.5
PH URINE: 5.5
PH URINE: 6
PHOSPHATE SERPL-MCNC: 3.7 MG/DL
PHOSPHATE SERPL-MCNC: 3.8 MG/DL
PLATELET # BLD AUTO: 290 K/UL
PLATELET # BLD AUTO: 296 K/UL
POTASSIUM SERPL-SCNC: 5.1 MMOL/L
POTASSIUM SERPL-SCNC: 5.2 MMOL/L
POTASSIUM SERPL-SCNC: 5.5 MMOL/L
POTASSIUM SERPL-SCNC: 5.5 MMOL/L
POTASSIUM SERPL-SCNC: 5.8 MMOL/L
POTASSIUM SERPL-SCNC: 5.8 MMOL/L
POTASSIUM SERPL-SCNC: 5.9 MMOL/L
POTASSIUM SERPL-SCNC: 6.1 MMOL/L
PROT UR-MCNC: 13 MG/DL
PROT UR-MCNC: 31 MG/DL
PROT UR-MCNC: 62 MG/DL
PROTEIN URINE: ABNORMAL
PROTEIN URINE: NEGATIVE
PROTEIN URINE: NORMAL
RBC # BLD: 3.12 M/UL
RBC # BLD: 3.26 M/UL
RBC # FLD: 11.8 %
RBC # FLD: 12.3 %
RED BLOOD CELLS URINE: 1 /HPF
RED BLOOD CELLS URINE: 2 /HPF
RED BLOOD CELLS URINE: 3 /HPF
SODIUM SERPL-SCNC: 139 MMOL/L
SODIUM SERPL-SCNC: 140 MMOL/L
SODIUM SERPL-SCNC: 142 MMOL/L
SODIUM SERPL-SCNC: 142 MMOL/L
SODIUM SERPL-SCNC: 144 MMOL/L
SPECIFIC GRAVITY URINE: 1.01
SPECIFIC GRAVITY URINE: 1.02
SPECIFIC GRAVITY URINE: 1.02
SQUAMOUS EPITHELIAL CELLS: 1 /HPF
SQUAMOUS EPITHELIAL CELLS: 1 /HPF
SQUAMOUS EPITHELIAL CELLS: 2 /HPF
TIBC SERPL-MCNC: 251 UG/DL
TIBC SERPL-MCNC: 296 UG/DL
UIBC SERPL-MCNC: 208 UG/DL
UIBC SERPL-MCNC: 253 UG/DL
UROBILINOGEN URINE: NORMAL
WBC # FLD AUTO: 5.63 K/UL
WBC # FLD AUTO: 5.84 K/UL
WHITE BLOOD CELLS URINE: 1 /HPF
WHITE BLOOD CELLS URINE: 1 /HPF
WHITE BLOOD CELLS URINE: 4 /HPF

## 2020-10-19 ENCOUNTER — APPOINTMENT (OUTPATIENT)
Dept: GERIATRICS | Facility: CLINIC | Age: 80
End: 2020-10-19
Payer: MEDICARE

## 2020-10-19 VITALS
TEMPERATURE: 98.2 F | RESPIRATION RATE: 14 BRPM | WEIGHT: 196.25 LBS | HEIGHT: 59 IN | OXYGEN SATURATION: 96 % | HEART RATE: 96 BPM | SYSTOLIC BLOOD PRESSURE: 130 MMHG | BODY MASS INDEX: 39.56 KG/M2 | DIASTOLIC BLOOD PRESSURE: 80 MMHG

## 2020-10-19 DIAGNOSIS — Z23 ENCOUNTER FOR IMMUNIZATION: ICD-10-CM

## 2020-10-19 PROCEDURE — 90662 IIV NO PRSV INCREASED AG IM: CPT

## 2020-10-19 PROCEDURE — G0008: CPT

## 2020-10-19 PROCEDURE — 99214 OFFICE O/P EST MOD 30 MIN: CPT | Mod: 25

## 2020-10-19 RX ORDER — SODIUM ZIRCONIUM CYCLOSILICATE 10 G/10G
10 POWDER, FOR SUSPENSION ORAL DAILY
Qty: 2 | Refills: 2 | Status: DISCONTINUED | COMMUNITY
Start: 2020-07-24 | End: 2020-10-19

## 2020-10-19 NOTE — SOCIAL HISTORY
[Fully functional (using the telephone, shopping, preparing meals, housekeeping, doing laundry, using transportation,] : Fully functional and needs no help or supervision to perform IADLs (using the telephone, shopping, preparing meals, housekeeping, doing laundry, using transportation, managing medications and managing finances) [No falls in past year] : Patient reported no falls in the past year [Fully functional (bathing, dressing, toileting, transferring, walking, feeding)] : Fully functional (bathing, dressing, toileting, transferring, walking, feeding)

## 2020-10-19 NOTE — PHYSICAL EXAM
[General Appearance - Alert] : alert [General Appearance - Well-Appearing] : healthy appearing [General Appearance - In No Acute Distress] : in no acute distress [Sclera] : the sclera and conjunctiva were normal [PERRL With Normal Accommodation] : pupils were equal in size, round, and reactive to light [Neck Cervical Mass (___cm)] : no neck mass was observed [Outer Ear] : the ears and nose were normal in appearance [Jugular Venous Distention Increased] : there was no jugular-venous distention [Exaggerated Use Of Accessory Muscles For Inspiration] : no accessory muscle use [Heart Sounds] : normal S1 and S2 [Auscultation Breath Sounds / Voice Sounds] : lungs were clear to auscultation bilaterally [Respiration, Rhythm And Depth] : normal respiratory rhythm and effort [Abdomen Soft] : soft [Abdomen Tenderness] : non-tender [Cervical Lymph Nodes Enlarged Posterior Bilaterally] : posterior cervical [Cervical Lymph Nodes Enlarged Anterior Bilaterally] : anterior cervical [Supraclavicular Lymph Nodes Enlarged Bilaterally] : supraclavicular [Axillary Lymph Nodes Enlarged Bilaterally] : axillary [No Spinal Tenderness] : no spinal tenderness [No CVA Tenderness] : no ~M costovertebral angle tenderness [] : no rash [Motor Tone] : muscle strength and tone were normal [Involuntary Movements] : no involuntary movements were seen [No Focal Deficits] : no focal deficits [Oriented To Time, Place, And Person] : oriented to person, place, and time [FreeTextEntry1] : 1+bilateral edema to mid tibia [TWNoteComboBox1] : Not Required

## 2020-10-19 NOTE — HISTORY OF PRESENT ILLNESS
[FreeTextEntry1] : Mrs. Simmons is a 80-year-old diabetic, hypertensive woman presenting for followup. Her main complaints are chronic low back pain and right shoulder pain. She is seeing an orthopedist. She is also experiencing lower extremity edema, progresses during the day. She is not experiencing any chest pain shortness of breath. Denies palpitations. Denies changes in bowel or bladder habits. She has her annual visit with GI Dr. Wilkerson in January. She recently saw Dr. Gtz of hematology oncology for chronic anemia and colon cancer followup. She is seeing nephrology later this week

## 2020-10-23 ENCOUNTER — APPOINTMENT (OUTPATIENT)
Dept: NEPHROLOGY | Facility: CLINIC | Age: 80
End: 2020-10-23
Payer: MEDICARE

## 2020-10-23 VITALS — SYSTOLIC BLOOD PRESSURE: 164 MMHG | DIASTOLIC BLOOD PRESSURE: 70 MMHG

## 2020-10-23 VITALS
OXYGEN SATURATION: 99 % | HEIGHT: 59 IN | HEART RATE: 79 BPM | WEIGHT: 192.46 LBS | DIASTOLIC BLOOD PRESSURE: 93 MMHG | BODY MASS INDEX: 38.8 KG/M2 | SYSTOLIC BLOOD PRESSURE: 170 MMHG

## 2020-10-23 PROCEDURE — 99214 OFFICE O/P EST MOD 30 MIN: CPT

## 2020-10-23 RX ORDER — FLUTICASONE PROPIONATE 50 UG/1
50 SPRAY, METERED NASAL TWICE DAILY
Qty: 1 | Refills: 1 | Status: DISCONTINUED | COMMUNITY
Start: 2019-11-20 | End: 2020-10-23

## 2020-10-23 RX ORDER — SILVER SULFADIAZINE 10 MG/G
1 CREAM TOPICAL TWICE DAILY
Qty: 1 | Refills: 2 | Status: DISCONTINUED | COMMUNITY
Start: 2020-03-06 | End: 2020-10-23

## 2020-10-24 NOTE — END OF VISIT
[] : Fellow [FreeTextEntry3] : If normal C peptide and if patient has significant proteinuria, will institute SGLT2 therapy and d/c glimeperide.  It is possible that the patient may tolerate RAAS inhibition without developing hyperkalemia with taking Chlorthalidone DAILY and SGLT2 inhibition. \par Follow up in one month. [>50% of the face to face encounter time was spent on counseling and/or coordination of care for ___] : Greater than 50% of the face to face encounter time was spent on counseling and/or coordination of care for [unfilled]

## 2020-10-24 NOTE — PHYSICAL EXAM
[General Appearance - Alert] : alert [General Appearance - In No Acute Distress] : in no acute distress [General Appearance - Well Nourished] : well nourished [General Appearance - Well Developed] : well developed [Sclera] : the sclera and conjunctiva were normal [Hearing Threshold Finger Rub Not [place]] : hearing was normal [Neck Appearance] : the appearance of the neck was normal [] : no respiratory distress [Respiration, Rhythm And Depth] : normal respiratory rhythm and effort [Exaggerated Use Of Accessory Muscles For Inspiration] : no accessory muscle use [Auscultation Breath Sounds / Voice Sounds] : lungs were clear to auscultation bilaterally [Apical Impulse] : the apical impulse was normal [Heart Rate And Rhythm] : heart rate was normal and rhythm regular [Heart Sounds] : normal S1 and S2 [Abdomen Soft] : soft [Abnormal Walk] : normal gait [Skin Color & Pigmentation] : normal skin color and pigmentation [Cranial Nerves] : cranial nerves 2-12 were intact [Deep Tendon Reflexes (DTR)] : deep tendon reflexes were 2+ and symmetric [No Focal Deficits] : no focal deficits [Oriented To Time, Place, And Person] : oriented to person, place, and time [Impaired Insight] : insight and judgment were intact [Affect] : the affect was normal [Mood] : the mood was normal [FreeTextEntry1] : 2/3+ edema of LE

## 2020-10-24 NOTE — ASSESSMENT
[FreeTextEntry1] : 79 year old lady with hypertension, type 2 diabetes (HgbA1c 5.9%), CKD stage 3, hyperlipidemia, and colon adenocarcinoma (2017) \par \par CKD stage 3 - patient with baseline Cr 1.3-1.4 this past year likely secondary to T2DM and hypertension. Patient currently off ACE due to issues with hyperkalemia. Discussed continuing Chlorthalidone and taking it every day and not intermittently. Will measure serum potassium levels today and likely will start ACE therapy again with loop diuretic due to concerns of hyperkalemia. Check C-Peptide as well and will consider adding SGLT2 inhibitor. Check A1c today.\par \par Hyperkalemia- she was unable to tolerate Lokelma ( nausea/ vomiting). check bmp and plasma potassium today. off lisinopril for now however will restart with loop diuretic for hyperkalemia. Consider SGLT2 inhibitor as well for distal delivery of sodium which should help K excretion as well.

## 2020-10-24 NOTE — HISTORY OF PRESENT ILLNESS
[FreeTextEntry1] : 78 yo lady with hypertension, type 2 diabetes (HgbA1c 5.9%), CKD 3, hyperlipidemia, and colon adenocarcinoma (2017)\par \par Admitted to [facility] in June 2019 after being sent in by her nephrologist (Dr. Kim of [facility]) for hyperkalemia to 6.9. She was initially referred to Dr. Kim by her PCP Dr. Bauer for elevated potassium to 5.6 in July. She remained admitted in the hospital for 1 day and repeat bloodwork on August 29th revealed normal K (4.6). Patient has been on ARB in the past which resulted in increase in Cr and ARB was subsequently discontinued at the time. Patient was started on Chlorthalidone and K had some improvement, however overall she has been in the 5.5-5.7 range. she did not want to start Lokelma. Patient was found to have continued hyperkalemia of 5.7 and Lisinopril was discontinued. Patient was maintained on Chlorthalidone and now presents for follow-up and management of diabetes, hyperkalemia, and BP.\par \par Patient states that she is having pain in her back and shoulder which has been chronic for her. She notes she has swelling in her legs however is constantly urinating due to the Chlorthalidone. She does admit that she has been missing doses of Chlorthalidone ~2 times per week. Otherwise she states her DM has been under control on Metformin and Glimepiride however does state that she has been noticing foam in her urine. No other major complaints noted.\par \par ROS \par  - Foamy urine\par CVS- No chest pain, no shortness of breath, 2+ edema of LE \par Integumentary- ++ leg pain and back pain \par all other systems reviewed in detail and were negative except as above

## 2020-10-26 LAB
ALBUMIN SERPL ELPH-MCNC: 4 G/DL
ANION GAP SERPL CALC-SCNC: 9 MMOL/L
APPEARANCE: CLEAR
BACTERIA: NEGATIVE
BASOPHILS # BLD AUTO: 0.09 K/UL
BASOPHILS NFR BLD AUTO: 1.6 %
BILIRUBIN URINE: NEGATIVE
BLOOD URINE: NEGATIVE
BUN SERPL-MCNC: 25 MG/DL
C PEPTIDE SERPL-MCNC: 2.8 NG/ML
CALCIUM SERPL-MCNC: 9.3 MG/DL
CHLORIDE SERPL-SCNC: 104 MMOL/L
CO2 SERPL-SCNC: 26 MMOL/L
COLOR: YELLOW
CREAT SERPL-MCNC: 1.09 MG/DL
CREAT SPEC-SCNC: 84 MG/DL
CREAT/PROT UR: 4.3 RATIO
EOSINOPHIL # BLD AUTO: 0.15 K/UL
EOSINOPHIL NFR BLD AUTO: 2.6 %
ESTIMATED AVERAGE GLUCOSE: 126 MG/DL
GLUCOSE QUALITATIVE U: NEGATIVE
GLUCOSE SERPL-MCNC: 121 MG/DL
HBA1C MFR BLD HPLC: 6 %
HCT VFR BLD CALC: 30.4 %
HGB BLD-MCNC: 9.7 G/DL
HYALINE CASTS: 2 /LPF
IMM GRANULOCYTES NFR BLD AUTO: 0.3 %
KETONES URINE: NEGATIVE
LEUKOCYTE ESTERASE URINE: NEGATIVE
LYMPHOCYTES # BLD AUTO: 1.13 K/UL
LYMPHOCYTES NFR BLD AUTO: 19.6 %
MAN DIFF?: NORMAL
MCHC RBC-ENTMCNC: 29.2 PG
MCHC RBC-ENTMCNC: 31.9 GM/DL
MCV RBC AUTO: 91.6 FL
MICROSCOPIC-UA: NORMAL
MONOCYTES # BLD AUTO: 0.59 K/UL
MONOCYTES NFR BLD AUTO: 10.2 %
NEUTROPHILS # BLD AUTO: 3.8 K/UL
NEUTROPHILS NFR BLD AUTO: 65.7 %
NITRITE URINE: NEGATIVE
PH URINE: 6.5
PHOSPHATE SERPL-MCNC: 3.7 MG/DL
PLATELET # BLD AUTO: 288 K/UL
POTASSIUM SERPL-SCNC: 4.5 MMOL/L
PROT UR-MCNC: 363 MG/DL
PROTEIN URINE: ABNORMAL
RBC # BLD: 3.32 M/UL
RBC # FLD: 12 %
RED BLOOD CELLS URINE: 4 /HPF
SODIUM SERPL-SCNC: 139 MMOL/L
SPECIFIC GRAVITY URINE: 1.02
SQUAMOUS EPITHELIAL CELLS: 3 /HPF
UROBILINOGEN URINE: NORMAL
WBC # FLD AUTO: 5.78 K/UL
WHITE BLOOD CELLS URINE: 3 /HPF

## 2020-11-06 ENCOUNTER — APPOINTMENT (OUTPATIENT)
Dept: NEPHROLOGY | Facility: CLINIC | Age: 80
End: 2020-11-06
Payer: MEDICARE

## 2020-11-06 VITALS — DIASTOLIC BLOOD PRESSURE: 72 MMHG | SYSTOLIC BLOOD PRESSURE: 140 MMHG

## 2020-11-06 VITALS
BODY MASS INDEX: 38.29 KG/M2 | OXYGEN SATURATION: 98 % | SYSTOLIC BLOOD PRESSURE: 157 MMHG | DIASTOLIC BLOOD PRESSURE: 67 MMHG | WEIGHT: 189.59 LBS | HEART RATE: 89 BPM

## 2020-11-06 PROCEDURE — 99214 OFFICE O/P EST MOD 30 MIN: CPT

## 2020-11-06 RX ORDER — SODIUM BICARBONATE 650 MG/1
650 TABLET ORAL TWICE DAILY
Qty: 120 | Refills: 3 | Status: DISCONTINUED | COMMUNITY
Start: 2020-08-04 | End: 2020-11-06

## 2020-11-06 NOTE — REASON FOR VISIT
[Follow-Up] : a follow-up visit [FreeTextEntry1] : Mrs. Simmons returns to follow up 2 weeks after her last visit

## 2020-11-06 NOTE — HISTORY OF PRESENT ILLNESS
[FreeTextEntry1] : Mrs Simmons has no side effects from Chlorthalidone. She states her legs are much less swollen.  The patient brought today an up to date list of her medications. It is noted that she is on Lisinopril 10 mg per day.  Her major issue is right shoulder pain.  The pain is compatible w/ a rotator cuff injury which likely happened several months ago while she use her arm to prevent a fall (oil on the floor).  The patient is taking Tylenol w/ codeine at night but she can only sleep for few hours.  Otherwise she is doing well.

## 2020-11-06 NOTE — PHYSICAL EXAM
[General Appearance - Alert] : alert [Sclera] : the sclera and conjunctiva were normal [Hearing Threshold Finger Rub Not [place]] : hearing was normal [Jugular Venous Distention Increased] : there was no jugular-venous distention [Auscultation Breath Sounds / Voice Sounds] : lungs were clear to auscultation bilaterally [Heart Sounds] : normal S1 and S2 [Heart Sounds Gallop] : no gallops [Pitting Edema] : pitting edema present [___ +] : bilateral [unfilled]+ pitting edema to the ankles [FreeTextEntry1] : Significant reduction of peripheral edema [Abdomen Tenderness] : non-tender [] : no hepato-splenomegaly [Urinary Bladder Findings] : the bladder was normal on palpation [No CVA Tenderness] : no ~M costovertebral angle tenderness [Abnormal Walk] : normal gait [No Focal Deficits] : no focal deficits [Oriented To Time, Place, And Person] : oriented to person, place, and time

## 2020-11-06 NOTE — ASSESSMENT
[FreeTextEntry1] : 1. Hypertension: now on Chlorthalidone with significant less edema.  BP systolic almost in range.  The patient is also on Lisinopril.  The combination should result in better BP control and the thiazide diuretic should help lessening the chance of hyperkalemia. \par 2. Hyperkalemia: K was normal at the time of her first visit.  Will repeat K at next visit.\par 3. Type II DM.  A1c is 6% on current regimen.  No change. \par 4 Proteinuria: will check urine now that the patient is on ACE Inhibitor. \par 5 Right shoulder pain: compatible w/ rotator cuff injury. Patient has an appointment w/  Given in early December.  Suggested to elevate her head w/ 3 pillows at night.  MRI of right shoulder ordered. \par Return in two months.

## 2020-11-09 LAB
APPEARANCE: CLEAR
BACTERIA: NEGATIVE
BILIRUBIN URINE: NEGATIVE
BLOOD URINE: NEGATIVE
COLOR: NORMAL
CREAT SPEC-SCNC: 80 MG/DL
CREAT/PROT UR: 1.9 RATIO
GLUCOSE QUALITATIVE U: NEGATIVE
HYALINE CASTS: 1 /LPF
KETONES URINE: NEGATIVE
LEUKOCYTE ESTERASE URINE: NEGATIVE
MICROSCOPIC-UA: NORMAL
NITRITE URINE: NEGATIVE
PH URINE: 6
PROT UR-MCNC: 150 MG/DL
PROTEIN URINE: ABNORMAL
RED BLOOD CELLS URINE: 7 /HPF
SPECIFIC GRAVITY URINE: 1.01
SQUAMOUS EPITHELIAL CELLS: 2 /HPF
UROBILINOGEN URINE: NORMAL
WHITE BLOOD CELLS URINE: 1 /HPF

## 2020-11-16 ENCOUNTER — RX RENEWAL (OUTPATIENT)
Age: 80
End: 2020-11-16

## 2020-12-01 ENCOUNTER — APPOINTMENT (OUTPATIENT)
Dept: NEPHROLOGY | Facility: CLINIC | Age: 80
End: 2020-12-01
Payer: MEDICARE

## 2020-12-01 VITALS
OXYGEN SATURATION: 98 % | WEIGHT: 189 LBS | DIASTOLIC BLOOD PRESSURE: 68 MMHG | HEART RATE: 86 BPM | SYSTOLIC BLOOD PRESSURE: 141 MMHG | BODY MASS INDEX: 38.17 KG/M2

## 2020-12-01 PROCEDURE — 99214 OFFICE O/P EST MOD 30 MIN: CPT

## 2020-12-02 VITALS — SYSTOLIC BLOOD PRESSURE: 136 MMHG | DIASTOLIC BLOOD PRESSURE: 66 MMHG

## 2020-12-02 NOTE — ASSESSMENT
[FreeTextEntry1] : 1. Hypertension: BP is now controlled and patient has minimal edema.\par 2.. Type II DM. As the patient still has significant proteinuria (although 50% less than before) I discussed the use of an SGLT2 inhibitor instead of Glimepiride.  Will check w/ her pharmacy what is covered. \par 3. Right Shoulder pain: discussed need for orthopedic consultation.  Patient a steroid injection few months ago w/o significant improvement.  Discussed that NSAIDs are contraindicated by her renal issue. \par 4 Colon Adenocarcinoma: CT scan shows a possible hepatic lesion and a possible renal cell carcinoma.  Needs MRI to characterize these two lesions.  As patient is claustrophobic, will prescribe Xanax 0.5 mg 30 minutes prior to the procedure. \par Return in one month.

## 2020-12-02 NOTE — PHYSICAL EXAM
[General Appearance - Alert] : alert [Sclera] : the sclera and conjunctiva were normal [Hearing Threshold Finger Rub Not [place]] : hearing was normal [Jugular Venous Distention Increased] : there was no jugular-venous distention [Auscultation Breath Sounds / Voice Sounds] : lungs were clear to auscultation bilaterally [Heart Sounds] : normal S1 and S2 [Heart Sounds Gallop] : no gallops [Pitting Edema] : pitting edema present [___ +] : bilateral [unfilled]+ pitting edema to the ankles [Abdomen Tenderness] : non-tender [] : no hepato-splenomegaly [Urinary Bladder Findings] : the bladder was normal on palpation [No CVA Tenderness] : no ~M costovertebral angle tenderness [Abnormal Walk] : normal gait [No Focal Deficits] : no focal deficits [Oriented To Time, Place, And Person] : oriented to person, place, and time [FreeTextEntry1] : Significant reduction of peripheral edema

## 2020-12-02 NOTE — HISTORY OF PRESENT ILLNESS
[FreeTextEntry1] : Mrs. Simmons continues to have severe pain in the right shoulder.  She has an appointment w/ an orthopedic surgeon on December 7th.  She has an MRI of the right shoulder and I discussed the results w/ her.  The MRI shows rotator cuff lesions w a partial tear of the infraspinatus tendon, rotator cuff atrophy and marked tendinosis of supra and infraspinatus. Other changes include biceps tenosynovitis and osteoarthritic changes.  The patient also had a CT scan of chest and abdomen in follow up for her colon cancer, ordered by Dr. Jonatan Gtz.  The test showed a 0.8 cm lesion in hepatic segment 7 and a left renal interpolar enhancing lesion.  Both findings need further work up w/ MRI.

## 2020-12-04 ENCOUNTER — APPOINTMENT (OUTPATIENT)
Dept: NEPHROLOGY | Facility: CLINIC | Age: 80
End: 2020-12-04

## 2020-12-09 ENCOUNTER — APPOINTMENT (OUTPATIENT)
Dept: GERIATRICS | Facility: CLINIC | Age: 80
End: 2020-12-09
Payer: MEDICARE

## 2020-12-09 VITALS
HEIGHT: 59 IN | TEMPERATURE: 98 F | RESPIRATION RATE: 14 BRPM | DIASTOLIC BLOOD PRESSURE: 60 MMHG | BODY MASS INDEX: 37.9 KG/M2 | HEART RATE: 72 BPM | SYSTOLIC BLOOD PRESSURE: 143 MMHG | OXYGEN SATURATION: 97 % | WEIGHT: 188 LBS

## 2020-12-09 PROCEDURE — 99214 OFFICE O/P EST MOD 30 MIN: CPT

## 2020-12-09 NOTE — PHYSICAL EXAM
[General Appearance - Alert] : alert [General Appearance - In No Acute Distress] : in no acute distress [General Appearance - Well Nourished] : well nourished [General Appearance - Well Developed] : well developed [General Appearance - Well-Appearing] : healthy appearing [Sclera] : the sclera and conjunctiva were normal [PERRL With Normal Accommodation] : pupils were equal in size, round, and reactive to light [Extraocular Movements] : extraocular movements were intact [Strabismus] : no strabismus was seen [Outer Ear] : the ears and nose were normal in appearance [Neck Cervical Mass (___cm)] : no neck mass was observed [Jugular Venous Distention Increased] : there was no jugular-venous distention [Respiration, Rhythm And Depth] : normal respiratory rhythm and effort [Exaggerated Use Of Accessory Muscles For Inspiration] : no accessory muscle use [Auscultation Breath Sounds / Voice Sounds] : lungs were clear to auscultation bilaterally [Heart Sounds] : normal S1 and S2 [Abdomen Soft] : soft [Abdomen Tenderness] : non-tender [Cervical Lymph Nodes Enlarged Posterior Bilaterally] : posterior cervical [Cervical Lymph Nodes Enlarged Anterior Bilaterally] : anterior cervical [Supraclavicular Lymph Nodes Enlarged Bilaterally] : supraclavicular [Axillary Lymph Nodes Enlarged Bilaterally] : axillary [Femoral Lymph Nodes Enlarged Bilaterally] : femoral [No CVA Tenderness] : no ~M costovertebral angle tenderness [No Spinal Tenderness] : no spinal tenderness [Nail Clubbing] : no clubbing  or cyanosis of the fingernails [Involuntary Movements] : no involuntary movements were seen [Motor Tone] : muscle strength and tone were normal [] : no rash [No Focal Deficits] : no focal deficits [Oriented To Time, Place, And Person] : oriented to person, place, and time [FreeTextEntry1] : trace edema

## 2020-12-09 NOTE — HISTORY OF PRESENT ILLNESS
[1] : 1) Little interest or pleasure doing things for several days [2] : 2) Feeling down, depressed, or hopeless for more than half of the days [PHQ-2 Score ___] : PHQ-2 Score [unfilled] [FreeTextEntry1] : Mrs. Daya Simmons is an 80-year-old diabetic, hypertensive woman presenting for followup. She complains of not feeling well and not being able to do the things she used to do. She remains functionally independent, still drives and able to manage her own household, cooks her own meals, does her own shopping. Her adult daughter lives with her and is obviously a cause of major distress for the patient. Patient is tearful throughout most of the encounter. She also complains of chronic low back pain and right shoulder pain.\par Results of recent CT C/A/P reviewed with patient. She had an MRI of the abdomen performed yesterday, results not available. She follows with Heme/onc Dr. Gtz.\par Patient denies chest pain, shortness of breath, abdominal pain, change in bowel or bladder habits.

## 2020-12-09 NOTE — REVIEW OF SYSTEMS
[Feeling Poorly] : feeling poorly [Feeling Tired] : feeling tired [As Noted in HPI] : as noted in HPI [Negative] : Heme/Lymph [Fever] : no fever [Chills] : no chills

## 2021-01-20 ENCOUNTER — RX RENEWAL (OUTPATIENT)
Age: 81
End: 2021-01-20

## 2021-01-21 ENCOUNTER — RX RENEWAL (OUTPATIENT)
Age: 81
End: 2021-01-21

## 2021-01-25 ENCOUNTER — APPOINTMENT (OUTPATIENT)
Dept: NEPHROLOGY | Facility: CLINIC | Age: 81
End: 2021-01-25
Payer: MEDICARE

## 2021-01-25 VITALS
OXYGEN SATURATION: 98 % | WEIGHT: 180 LBS | SYSTOLIC BLOOD PRESSURE: 139 MMHG | BODY MASS INDEX: 36.36 KG/M2 | TEMPERATURE: 97.7 F | HEART RATE: 77 BPM | DIASTOLIC BLOOD PRESSURE: 67 MMHG

## 2021-01-25 VITALS — DIASTOLIC BLOOD PRESSURE: 66 MMHG | SYSTOLIC BLOOD PRESSURE: 132 MMHG

## 2021-01-25 PROCEDURE — 99214 OFFICE O/P EST MOD 30 MIN: CPT

## 2021-01-25 NOTE — HISTORY OF PRESENT ILLNESS
[FreeTextEntry1] : THe patient returns for follow up after the institution of Farxiga 5 mg per day to decrease proteinuria and reduce the cardiovascular risk.  She reports no side effects from the new medication except for an increase in urination.  No dysuria.  The major problems the remain her right shoulder that is very painful and prevents the patient from sleeping.  She has bee given Tylenol w/ Codeine by her geriatrician that she takes at night w/ minimal improvement.  She had seen a rheumatologist who suggested physical therapy but did not make a referral.\par The patient had an MRI for follow up of a possible hepatic lesion and a complex renal cyst.  She reports that her oncologist review the test and told her that the lesions were benign. \par The patient requests to discuss about what to do about her shoulder pain.

## 2021-01-25 NOTE — REASON FOR VISIT
[Follow-Up] : a follow-up visit [FreeTextEntry1] : Follow up for proteinuria and hypertension in the setting to type II DM.

## 2021-01-25 NOTE — ASSESSMENT
[FreeTextEntry1] : 1. Right shoulder pain: disabling. MRI shown marked tendinosis of the supraspinatus and infraspinatus in addition to subacromial and subdeltoid bursitis.  There is also osteoarthritis.  I discussed w/ the patient that we should start w/ physical therapy evaluation and treatment.  If no improvement she will be referred to orthopedics. \par 2. Proteinuria: to check urine albumin/creatinine ration response to SGTL2 inhibitor \par 3 Type II DM. will check A1c. \par The patient will call my office after undergoing physical therapy for 2 weeks to discuss results.\par I will call the patient in the morning to discuss the results of the above tests. \par Return in 3 months.

## 2021-01-25 NOTE — PHYSICAL EXAM
[General Appearance - Alert] : alert [Sclera] : the sclera and conjunctiva were normal [Hearing Threshold Finger Rub Not [place]] : hearing was normal [Jugular Venous Distention Increased] : there was no jugular-venous distention [Auscultation Breath Sounds / Voice Sounds] : lungs were clear to auscultation bilaterally [Heart Sounds] : normal S1 and S2 [Heart Sounds Gallop] : no gallops [Pitting Edema] : pitting edema present [___ +] : bilateral [unfilled]+ pitting edema to the ankles [Abdomen Tenderness] : non-tender [] : no hepato-splenomegaly [Urinary Bladder Findings] : the bladder was normal on palpation [No CVA Tenderness] : no ~M costovertebral angle tenderness [Abnormal Walk] : normal gait [FreeTextEntry1] : Limited range of motion of righ shoulder [No Focal Deficits] : no focal deficits [Oriented To Time, Place, And Person] : oriented to person, place, and time

## 2021-01-25 NOTE — REVIEW OF SYSTEMS
[Feeling Poorly] : feeling poorly [Chest Pain] : no chest pain [Palpitations] : no palpitations [SOB on Exertion] : no shortness of breath during exertion [Diarrhea] : diarrhea [As Noted in HPI] : as noted in HPI [Negative] : Psychiatric [FreeTextEntry8] : Frequent urination

## 2021-01-27 LAB
ALBUMIN SERPL ELPH-MCNC: 4.2 G/DL
ANION GAP SERPL CALC-SCNC: 13 MMOL/L
BUN SERPL-MCNC: 35 MG/DL
CALCIUM SERPL-MCNC: 10 MG/DL
CHLORIDE SERPL-SCNC: 101 MMOL/L
CO2 SERPL-SCNC: 24 MMOL/L
CREAT SERPL-MCNC: 1.28 MG/DL
CREAT SPEC-SCNC: 72 MG/DL
ESTIMATED AVERAGE GLUCOSE: 163 MG/DL
GLUCOSE SERPL-MCNC: 155 MG/DL
HBA1C MFR BLD HPLC: 7.3 %
MICROALBUMIN 24H UR DL<=1MG/L-MCNC: 46.4 MG/DL
MICROALBUMIN/CREAT 24H UR-RTO: 647 MG/G
PHOSPHATE SERPL-MCNC: 4 MG/DL
POTASSIUM SERPL-SCNC: 4.8 MMOL/L
SODIUM SERPL-SCNC: 138 MMOL/L

## 2021-04-02 ENCOUNTER — APPOINTMENT (OUTPATIENT)
Dept: NEPHROLOGY | Facility: CLINIC | Age: 81
End: 2021-04-02
Payer: MEDICARE

## 2021-04-02 VITALS
OXYGEN SATURATION: 99 % | DIASTOLIC BLOOD PRESSURE: 77 MMHG | BODY MASS INDEX: 35.28 KG/M2 | HEIGHT: 59 IN | TEMPERATURE: 97.4 F | SYSTOLIC BLOOD PRESSURE: 152 MMHG | WEIGHT: 175 LBS | HEART RATE: 77 BPM

## 2021-04-02 VITALS — DIASTOLIC BLOOD PRESSURE: 66 MMHG | SYSTOLIC BLOOD PRESSURE: 136 MMHG

## 2021-04-02 PROCEDURE — 99213 OFFICE O/P EST LOW 20 MIN: CPT

## 2021-04-03 NOTE — ASSESSMENT
[FreeTextEntry1] : 1. Right shoulder pain: disabling. MRI shown marked tendinosis of the supraspinatus and infraspinatus in addition to subacromial and subdeltoid bursitis. I have given the patient the telephone number of the orthopedic group of [facility] and have emailed the office notifying them that the patient will call on Monday to make an appoitntment. \par 2. Hypertension: repeat blood pressure in an acceptable range. \par 3. Proteinuria: will check urine protein/creatinine ration.  Patient is on optimal antiproteinuric therapy.\par 4. Type II DM.  WIll check A1c.\par Plan: labs today.  Will call the patient on Monday. Ortho referral made.\par Return in 2 months.

## 2021-04-03 NOTE — HISTORY OF PRESENT ILLNESS
[FreeTextEntry1] : Mrs. Simmons is here for follow up.  Her major issue remains the right shoulder pain that interferes w/ daily activity and sleeping. Physical Therapy has not improved the situation. The patient is requesting a referral to an orthopedic surgeon.  She had a steroid injection several years ago w/o improvement.  The other issue is frequent urination since she has been on Farxiga.

## 2021-04-03 NOTE — PHYSICAL EXAM
[General Appearance - Alert] : alert [Sclera] : the sclera and conjunctiva were normal [Hearing Threshold Finger Rub Not Cedar] : hearing was normal [Jugular Venous Distention Increased] : there was no jugular-venous distention [Auscultation Breath Sounds / Voice Sounds] : lungs were clear to auscultation bilaterally [Heart Sounds] : normal S1 and S2 [Heart Sounds Gallop] : no gallops [Pitting Edema] : pitting edema present [___ +] : bilateral [unfilled]+ pitting edema to the ankles [Abdomen Tenderness] : non-tender [Urinary Bladder Findings] : the bladder was normal on palpation [No CVA Tenderness] : no ~M costovertebral angle tenderness [Abnormal Walk] : normal gait [No Focal Deficits] : no focal deficits [Oriented To Time, Place, And Person] : oriented to person, place, and time [FreeTextEntry1] : Limited range of motion of righ shoulder [] : no rash

## 2021-04-03 NOTE — REVIEW OF SYSTEMS
[Feeling Poorly] : feeling poorly [Diarrhea] : diarrhea [As Noted in HPI] : as noted in HPI [Chest Pain] : no chest pain [Palpitations] : no palpitations [SOB on Exertion] : no shortness of breath during exertion [Negative] : Respiratory [FreeTextEntry8] : Frequent urination

## 2021-04-05 LAB
ALBUMIN SERPL ELPH-MCNC: 4.4 G/DL
ALP BLD-CCNC: 83 U/L
ALT SERPL-CCNC: 6 U/L
ANION GAP SERPL CALC-SCNC: 11 MMOL/L
APPEARANCE: CLEAR
AST SERPL-CCNC: 9 U/L
BACTERIA: NEGATIVE
BASOPHILS # BLD AUTO: 0.12 K/UL
BASOPHILS NFR BLD AUTO: 1.8 %
BILIRUB SERPL-MCNC: 0.2 MG/DL
BILIRUBIN URINE: NEGATIVE
BLOOD URINE: NEGATIVE
BUN SERPL-MCNC: 26 MG/DL
CALCIUM SERPL-MCNC: 9.4 MG/DL
CHLORIDE SERPL-SCNC: 103 MMOL/L
CO2 SERPL-SCNC: 27 MMOL/L
COLOR: NORMAL
CREAT SERPL-MCNC: 1.15 MG/DL
CREAT SPEC-SCNC: 92 MG/DL
CREAT/PROT UR: 1.2 RATIO
EOSINOPHIL # BLD AUTO: 0.12 K/UL
EOSINOPHIL NFR BLD AUTO: 1.8 %
ESTIMATED AVERAGE GLUCOSE: 157 MG/DL
FERRITIN SERPL-MCNC: 66 NG/ML
GLUCOSE QUALITATIVE U: ABNORMAL
GLUCOSE SERPL-MCNC: 152 MG/DL
HBA1C MFR BLD HPLC: 7.1 %
HCT VFR BLD CALC: 33 %
HGB BLD-MCNC: 10.4 G/DL
HYALINE CASTS: 1 /LPF
IMM GRANULOCYTES NFR BLD AUTO: 0.1 %
IRON SATN MFR SERPL: 13 %
IRON SERPL-MCNC: 42 UG/DL
KETONES URINE: NEGATIVE
LEUKOCYTE ESTERASE URINE: NEGATIVE
LYMPHOCYTES # BLD AUTO: 1.19 K/UL
LYMPHOCYTES NFR BLD AUTO: 17.8 %
MAN DIFF?: NORMAL
MCHC RBC-ENTMCNC: 29.1 PG
MCHC RBC-ENTMCNC: 31.5 GM/DL
MCV RBC AUTO: 92.2 FL
MICROSCOPIC-UA: NORMAL
MONOCYTES # BLD AUTO: 0.52 K/UL
MONOCYTES NFR BLD AUTO: 7.8 %
NEUTROPHILS # BLD AUTO: 4.73 K/UL
NEUTROPHILS NFR BLD AUTO: 70.7 %
NITRITE URINE: NEGATIVE
PH URINE: 6.5
PLATELET # BLD AUTO: 369 K/UL
POTASSIUM SERPL-SCNC: 4.7 MMOL/L
PROT SERPL-MCNC: 6.7 G/DL
PROT UR-MCNC: 109 MG/DL
PROTEIN URINE: ABNORMAL
RBC # BLD: 3.58 M/UL
RBC # FLD: 12.1 %
RED BLOOD CELLS URINE: 2 /HPF
SODIUM SERPL-SCNC: 141 MMOL/L
SPECIFIC GRAVITY URINE: 1.02
SQUAMOUS EPITHELIAL CELLS: 1 /HPF
TIBC SERPL-MCNC: 324 UG/DL
UIBC SERPL-MCNC: 282 UG/DL
UROBILINOGEN URINE: NORMAL
WBC # FLD AUTO: 6.69 K/UL
WHITE BLOOD CELLS URINE: 3 /HPF

## 2021-04-05 NOTE — HISTORY OF PRESENT ILLNESS
PT called about possibly getting some blood work ordered or maybe getting some B12. Her energy is really low. She is getting sleep but still so tired.       
[FreeTextEntry1] : Follow up CKD \par \par 80 yo lady with hypertension, type 2 diabetes (HgbA1c 5.9%), CKD 3, hyperlipidemia, and colon adenocarcinoma (2017) \par \par admitted to [facility] in June 2019 after being sent in by her nephrologist (Dr. Kim of [facility]) for hyperkalemia to 6.9. She was initially referred to Dr. Kim by her PCP Dr. Bauer for elevated potassium to 5.6 in July. She remained admitted in the hospital for 1 day and repeat bloodwork on August 29th revealed normal K (4.6). Patient has been on ARB in the past which resulted in increase in Cr and ARB was subsequently discontinued at the time. \par \par I started her on Chlorthalidone and K had some improvement, however overall she has been in the 5.5-5.7 range. she is currently on a low dose of Lisinopril 10 mg daily. she was advised to start Lokelma but she wanted her labs to be repeated. \par \par ROS \par  - No changes in urination, no blood in urine \par CVS- No chest pain, no shortness of breath \par all other systems reviewed in detail and were negative except as above\par \par

## 2021-04-20 ENCOUNTER — APPOINTMENT (OUTPATIENT)
Dept: ORTHOPEDIC SURGERY | Facility: CLINIC | Age: 81
End: 2021-04-20
Payer: MEDICARE

## 2021-04-20 VITALS
WEIGHT: 175 LBS | HEART RATE: 93 BPM | DIASTOLIC BLOOD PRESSURE: 71 MMHG | HEIGHT: 59 IN | BODY MASS INDEX: 35.28 KG/M2 | SYSTOLIC BLOOD PRESSURE: 142 MMHG

## 2021-04-20 PROCEDURE — 99203 OFFICE O/P NEW LOW 30 MIN: CPT | Mod: 25

## 2021-04-20 PROCEDURE — 20610 DRAIN/INJ JOINT/BURSA W/O US: CPT | Mod: RT

## 2021-04-20 PROCEDURE — 73030 X-RAY EXAM OF SHOULDER: CPT | Mod: RT

## 2021-04-20 NOTE — HISTORY OF PRESENT ILLNESS
[de-identified] : 81 y/o RHD female who is retired  presents with right shoulder pain due to an injury on 7/2019 at a [employer]. She states that she was slipping on oil an tried to prevent the fall with by grabbing her cart. Since then her shoulder has been bothering her. She was seen by Dr. Jason and had an MRI done. She is here today for consultation. She states the pain is a 4/10 at rest and increases to a 10/10 at times. The pain is keeping her up at night. She does have pain coming from her neck and radiates down into her arm. \par \par Hx: DM medication controlled. HTN, Hyperlipidemia.

## 2021-04-20 NOTE — DISCUSSION/SUMMARY
[de-identified] : 81 y/o female with right shoulder pain secondary to degenerative changes in the shoulder, partial biceps tear, rotator cuff tendinopathy, synovitis. A lengthy discussion was held regarding the patient’s condition and treatment options including all risks, benefits, prognosis and outcomes of each were discussed in detail. The patient would like to continue with conservative management at this time and was advised on the use of Tylenol for pain control, icing, activity modification, and possible cortisone injections. Ice shoulder 30 minutes 4-5x a day. Use extra strength tylenol. The patient will continue PT and a new prescription was provided. The patient also received a right shoulder injection in the office today, which she tolerated well. The patient has a history of diabetes, so it was recommended that the patient monitor her blood sugar levels at home for the next couple weeks. The patient will contact me if there are any concerns otherwise follow up will be in 6 weeks. The patient express understanding and all questions were answered.\par

## 2021-04-20 NOTE — CONSULT LETTER
[Dear  ___] : Dear  [unfilled], [FreeTextEntry1] : I had the pleasure of evaluating your patient in the office today for complaints of right shoulder pain secondary to partial biceps tear, rotator cuff tendinopathy, and synovitis. I have enclosed a copy of today's office notes for your charts and for your review.\par \par Sincerely, \par \par Evangelista Velasquez M.D.\par Professor and \par Department of Orthopedic Surgery\par [facility] [place]\par

## 2021-04-20 NOTE — REASON FOR VISIT
[Initial Visit] : an initial visit for [FreeTextEntry2] : Right shoulder pain. Referred by Dr. Joe Jason

## 2021-04-20 NOTE — PHYSICAL EXAM
[de-identified] : Pt is pleasant 79 yo female, AAOx3 with no apparent distress, increased BMI.  Physical examination of the right shoulder reveals normal contours with no deformity, skin intact, with no signs of infection, no erythema, no swelling, no discoloration, no distal lymphedema, no patholaxity, no muscle atrophy noted. ROM of right shoulder reveals forward flexion to 120°,  external rotation 25°,  IR to L1. Motor strength 4/5 in ER, IR, and  5/5 FF in the scapular plane. No neurological deficits.\par  [de-identified] : X-rays were ordered, obtained, and interpreted by me today: 4 views of the right shoulder demonstrate distal clavicle cystic changes, with no acute fracture or dislocation.\par \par MRI R shoulder was reviewed: (Stand up MRI [place], 11/27/20): degenerative changes of labrum, synovitis and bursitis, partial biceps tear, tendinopathy, partial rotator cuff tear

## 2021-04-20 NOTE — PROCEDURE
[de-identified] : After careful discussion regarding the risks versus benefits of a corticosteroid injection the patient has elected to proceed with that treatment. Under sterile conditions, the patient received  a right shoulder injection into the glenohumeral and bursal sided with 8 cc of half percent Marcaine without epinephrine and 2 cc of Betamethasone. The site was cleaned and a bandaid was applied. The patient tolerated the injection without problem.\par \par

## 2021-05-07 ENCOUNTER — APPOINTMENT (OUTPATIENT)
Dept: ORTHOPEDIC SURGERY | Facility: CLINIC | Age: 81
End: 2021-05-07
Payer: MEDICARE

## 2021-05-07 VITALS
DIASTOLIC BLOOD PRESSURE: 80 MMHG | HEART RATE: 83 BPM | BODY MASS INDEX: 35.28 KG/M2 | WEIGHT: 175 LBS | SYSTOLIC BLOOD PRESSURE: 144 MMHG | HEIGHT: 59 IN

## 2021-05-07 DIAGNOSIS — Z87.898 PERSONAL HISTORY OF OTHER SPECIFIED CONDITIONS: ICD-10-CM

## 2021-05-07 PROCEDURE — 99215 OFFICE O/P EST HI 40 MIN: CPT

## 2021-05-07 PROCEDURE — 72100 X-RAY EXAM L-S SPINE 2/3 VWS: CPT

## 2021-05-10 PROBLEM — Z87.898 HISTORY OF HEARTBURN: Status: RESOLVED | Noted: 2021-05-10 | Resolved: 2021-05-10

## 2021-05-10 NOTE — PHYSICAL EXAM
[de-identified] : She is fully alert and oriented with a normal mood and affect.  She is in no acute distress as I take the history.  She ambulates with a slow but otherwise normal gait.  She can tiptoe and heel walk.  Cutaneous examination of the spine reveals a well-healed midline incision.  There is no evidence of shortness of breath or respiratory distress.  Her lower extremity neurological examination revealed 1+ symmetrical knee jerks with bilateral absent ankle jerks.  Motor power is normal to manual testing in all lower extremity groups and sensation is normal to light touch in all dermatomes.  Straight leg raising is negative to 90 degrees in the sitting position.  There is no paravertebral muscle spasm, sciatic notch tenderness or trochanteric tenderness.  There are no cutaneous abnormalities of the upper or lower extremities.  Vascular examination shows no evidence of any significant varicosities and there is no lymphedema.  Her knees and ankles have a full and painless range of motion with normal stability.  Her upper extremities are normal to inspection and her elbows have a full and painless range of motion with normal motor power normal stability. [de-identified] : AP and lateral x-rays of the lumbar spine reveal evidence of a prior L5 laminectomy.  Flexion extension x-rays were obtained.  There is no evidence of any spinal instability or destructive change.  There are marked multilevel degenerative changes.

## 2021-05-10 NOTE — HISTORY OF PRESENT ILLNESS
[de-identified] : This 80-year-old woman has a long history of spine related symptoms.  She underwent a lumbar laminectomy in 1998 reportedly for herniated lumbar disc at which point she had lower back pain radiating to the right lower extremity.  She did relatively well after that but had a stumble and not quite a fall in 2019 and since then has had increasing complaints of lower back pain that she grades is somewhere between an 8 and a 10 with radiation to both lower extremities into the buttocks and posterior thighs but not below the knee and worse on the right than on the left.  She has not had associated neurologic symptoms of numbness, paresthesias or weakness.  The pain is no worse coughing or forcing to move her bowels but it is worse sneezing.  She has had night pain.  The pain is equally aggravated by both sitting and standing and it is also worse walking.  She has had a course of physical therapy without help.  She has had prior surgery for colon cancer without postsurgical chemotherapy or radiation.  She has been diabetic since 2002.  She is on multiple medications for her diabetes, hypertension and hyperlipidemia.  She has had reflux problems in the past.  She has had borderline elevated creatinines in the past and at one point slightly elevated creatinine.

## 2021-05-10 NOTE — DISCUSSION/SUMMARY
[Medication Risks Reviewed] : Medication risks reviewed [de-identified] : I reviewed her lab work over several years and as noted above she has had an elevated creatinine at points in a borderline high creatinine in the past.  She will use moist heat and push Tylenol to the full dose.  We discussed the risks of nonsteroidal anti-inflammatory medications with her mildly compromised renal function.  We discussed a course of oral prednisone but that would significantly raise her blood sugar particularly since her blood sugar went above 500 after a recent steroid shot to her shoulders.  We also discussed the possibility of epidural steroid injections.  I will see her for follow-up in 4 weeks.

## 2021-05-10 NOTE — REVIEW OF SYSTEMS
[Heartburn] : heartburn [Negative] : Respiratory [FreeTextEntry5] : Hypertension and hyperlipidemia [de-identified] : Type 2 diabetes

## 2021-05-13 ENCOUNTER — APPOINTMENT (OUTPATIENT)
Dept: ORTHOPEDIC SURGERY | Facility: CLINIC | Age: 81
End: 2021-05-13
Payer: MEDICARE

## 2021-05-13 PROCEDURE — 99213 OFFICE O/P EST LOW 20 MIN: CPT

## 2021-05-13 NOTE — REASON FOR VISIT
[Follow-Up Visit] : a follow-up visit for [FreeTextEntry2] : Right shoulder pain. Referred by Dr. Joe Jason

## 2021-05-13 NOTE — HISTORY OF PRESENT ILLNESS
[de-identified] : 81 y/o RHD female who is retired  presents with right shoulder pain due to an injury on 7/2019 at a [employer]. She was seen and was found to have mild degenerative changes and partial cuff tear on MRI.  She was treated with an injection and PT which did help. Her symptoms are not as bad as the last visit but it still lingers. She states the pain is a 5/10, which she takes Extra Strength Tylenol.  \par \par Hx: DM medication controlled. HTN, Hyperlipidemia.

## 2021-05-13 NOTE — CONSULT LETTER
[Dear  ___] : Dear  [unfilled], [Please see my note below.] : Please see my note below. [FreeTextEntry1] : I had the pleasure of evaluating your patient in the office today for complaints of right shoulder pain secondary to partial biceps tear, rotator cuff tendinopathy, and synovitis. I have enclosed a copy of today's office notes for your charts and for your review.\par \par Sincerely, \par \par Evangelista Velasquez M.D.\par Professor and \par Department of Orthopedic Surgery\par [facility] [place]\par

## 2021-05-13 NOTE — DISCUSSION/SUMMARY
[de-identified] : 79 y/o female with right shoulder pain secondary to degenerative changes in the shoulder, partial biceps tear, rotator cuff tendinopathy, synovitis. A lengthy discussion was held regarding the patient’s condition and treatment options including all risks, benefits, prognosis and outcomes of each were discussed in detail. The patient would like to continue with conservative management at this time and was advised on the use of Tylenol for pain contro as she cannot take NSAIDs.  icing, activity modification, and possible cortisone injections in the future as she received one on 4/20/21. Ice shoulder 30 minutes 4-5x a day. Use extra strength tylenol. The patient will continue PT and a new prescription was provided. The patient will contact me if there are any concerns otherwise follow up will be in 3 months. The patient express understanding and all questions were answered.\par

## 2021-05-13 NOTE — PHYSICAL EXAM
[de-identified] : X-rays were ordered, obtained, and interpreted by me today: 4 views of the right shoulder demonstrate distal clavicle cystic changes, with no acute fracture or dislocation.\par \par MRI R shoulder was reviewed: (Stand up MRI [place], 11/27/20): degenerative changes of labrum, synovitis and bursitis, partial biceps tear, tendinopathy, partial rotator cuff tear [de-identified] : Pt is pleasant 81 yo female, AAOx3 with no apparent distress, increased BMI.  Physical examination of the right shoulder reveals normal contours with no deformity, skin intact, with no signs of infection, no erythema, no swelling, no discoloration, no distal lymphedema, no patholaxity, no muscle atrophy noted. ROM of right shoulder reveals forward flexion to 110°,  external rotation 25°,  IR to L3. Pain with internal rotation and adduction.  Motor strength 4/5 in ER, IR, and  5/5 FF in the scapular plane. No neurological deficits.\par

## 2021-06-03 ENCOUNTER — APPOINTMENT (OUTPATIENT)
Dept: ORTHOPEDIC SURGERY | Facility: CLINIC | Age: 81
End: 2021-06-03
Payer: MEDICARE

## 2021-06-03 VITALS
DIASTOLIC BLOOD PRESSURE: 61 MMHG | HEART RATE: 83 BPM | HEIGHT: 59 IN | SYSTOLIC BLOOD PRESSURE: 136 MMHG | BODY MASS INDEX: 34.27 KG/M2 | WEIGHT: 170 LBS

## 2021-06-03 DIAGNOSIS — M54.42 LUMBAGO WITH SCIATICA, LEFT SIDE: ICD-10-CM

## 2021-06-03 DIAGNOSIS — G89.29 LUMBAGO WITH SCIATICA, LEFT SIDE: ICD-10-CM

## 2021-06-03 DIAGNOSIS — M54.41 LUMBAGO WITH SCIATICA, LEFT SIDE: ICD-10-CM

## 2021-06-03 PROCEDURE — 99214 OFFICE O/P EST MOD 30 MIN: CPT

## 2021-06-03 NOTE — HISTORY OF PRESENT ILLNESS
[de-identified] : She has not seen any improvement in her back or leg symptoms.  She comes in today with her MRIs which she did not have with her at the time of her last visit.

## 2021-06-03 NOTE — DISCUSSION/SUMMARY
[Medication Risks Reviewed] : Medication risks reviewed [de-identified] : I again cussed with her that the normal first step would be nonsteroidal anti-inflammatory medications which she cannot take due to her renal status.  At this point the next step is a course of oral steroids.  It will obviously raise her blood sugars and I have recommended that she discuss that with her physician as she may need some insulin coverage.  If that does not work the next step is epidural steroid injections.  If that fails to work she is facing major spinal surgery.  If the oral steroids get her relief from the back pain the surgical management is much less with only a laminectomy where as if she has ongoing back pain is well surgical management would also require a fusion.  Obviously the goal is to get better with nonsurgical management and if the oral steroids relieve the back pain the epidural steroids may relieve the leg pain.  I have prescribed a 10-day prednisone taper and I should see her for follow-up 10 days after she finishes it.

## 2021-06-03 NOTE — PHYSICAL EXAM
[de-identified] : She is comfortable sitting and straight leg raising is negative to 90 degrees. [de-identified] : I reviewed an MRI of the lumbar spine from August 2019.  The laminectomy at L5 is apparent.  There is severe stenosis at L2-3 and L3-4.  She is also having some neck pain and I also reviewed an MRI of the cervical spine from 2019 that reveals only some minor disc bulges.

## 2021-06-04 ENCOUNTER — APPOINTMENT (OUTPATIENT)
Dept: NEPHROLOGY | Facility: CLINIC | Age: 81
End: 2021-06-04
Payer: MEDICARE

## 2021-06-04 VITALS
WEIGHT: 179 LBS | HEART RATE: 78 BPM | BODY MASS INDEX: 36.08 KG/M2 | OXYGEN SATURATION: 99 % | HEIGHT: 59 IN | DIASTOLIC BLOOD PRESSURE: 61 MMHG | TEMPERATURE: 97.6 F | SYSTOLIC BLOOD PRESSURE: 138 MMHG

## 2021-06-04 PROCEDURE — 99213 OFFICE O/P EST LOW 20 MIN: CPT

## 2021-06-07 NOTE — REVIEW OF SYSTEMS
[Chest Pain] : no chest pain [Palpitations] : no palpitations [SOB on Exertion] : no shortness of breath during exertion [As Noted in HPI] : as noted in HPI [Anxiety] : anxiety [Negative] : Neurological

## 2021-06-07 NOTE — HISTORY OF PRESENT ILLNESS
[FreeTextEntry1] : Mrs. Simmons's major issues continues to be her right shoulder and her low back pain.  PT has not helped.  She was seen by Dr. Velasquez for the shoulder and given a steroid injection w/ some help.  She was seen by Dr. Cornejo for her back.  As NSAIDs are not a great choice, she was given a prescription for a Prednisone taper.  She is is her to discuss this prescription in light of her diabetes.\par Beside the two issues above the patient has no other physical issues.  When asked what is going on at home, se stated that she is dealing with a daughter with menta issues, who does not help around the house and is on the computer the whole day.  The patient cries while talking about this issue.

## 2021-06-07 NOTE — REASON FOR VISIT
[Follow-Up] : a follow-up visit [FreeTextEntry1] : Follow up for hypertension and diabetic nephropathy with non nephrotic range protienuria.

## 2021-06-07 NOTE — ASSESSMENT
[FreeTextEntry1] : 1. Low back pain.  As the patient has not derived benefits from PT and Acetaminophen is not working (the patient cannot sleep at night because of her back and shoulder), I have agreed w/ a Prednisone taper. The patient will check her glucose regularly and keep in touch if her glucose is high.  Will adjust Metformin accordingly.  The patient has taken narcotics for pain in the past.  She states that she does not think she will become addicted as she does not like to take these medications.  I have given a short course of Oxycodone 5 mg to take if Acetaminophen does not help her with the pain at night.\par 2. DIabetic nephropathy with proteinuria. The patient is on Farxiga and CEI. WIll continue present regimen.\par 3. CKD III. Will check labs again at next visit.\par 4. Hypertension, BP well controlled.\par 5 Hyperkalemia: normal K on Chlorthalidone and Na Bicarbonate.\par Return in 2 months.

## 2021-06-07 NOTE — PHYSICAL EXAM
[General Appearance - Alert] : alert [Sclera] : the sclera and conjunctiva were normal [Hearing Threshold Finger Rub Not Alfalfa] : hearing was normal [Jugular Venous Distention Increased] : there was no jugular-venous distention [Auscultation Breath Sounds / Voice Sounds] : lungs were clear to auscultation bilaterally [Heart Sounds] : normal S1 and S2 [Heart Sounds Gallop] : no gallops [Pitting Edema] : pitting edema present [___ +] : bilateral [unfilled]+ pitting edema to the ankles [Abdomen Tenderness] : non-tender [Urinary Bladder Findings] : the bladder was normal on palpation [No CVA Tenderness] : no ~M costovertebral angle tenderness [Abnormal Walk] : normal gait [FreeTextEntry1] : Limited range of motion of right shoulder [] : no rash [No Focal Deficits] : no focal deficits [Oriented To Time, Place, And Person] : oriented to person, place, and time

## 2021-06-09 ENCOUNTER — NON-APPOINTMENT (OUTPATIENT)
Age: 81
End: 2021-06-09

## 2021-06-17 ENCOUNTER — TRANSCRIPTION ENCOUNTER (OUTPATIENT)
Age: 81
End: 2021-06-17

## 2021-06-17 ENCOUNTER — INPATIENT (INPATIENT)
Facility: HOSPITAL | Age: 81
LOS: 3 days | Discharge: HOME | DRG: 308 | End: 2021-06-21
Attending: HOSPITALIST | Admitting: HOSPITALIST
Payer: MEDICARE

## 2021-06-17 VITALS
OXYGEN SATURATION: 98 % | SYSTOLIC BLOOD PRESSURE: 170 MMHG | TEMPERATURE: 99 F | HEART RATE: 86 BPM | HEIGHT: 63 IN | WEIGHT: 184.97 LBS | RESPIRATION RATE: 15 BRPM | DIASTOLIC BLOOD PRESSURE: 81 MMHG

## 2021-06-17 DIAGNOSIS — N17.9 ACUTE KIDNEY FAILURE, UNSPECIFIED: ICD-10-CM

## 2021-06-17 DIAGNOSIS — R93.89 ABNORMAL FINDINGS ON DIAGNOSTIC IMAGING OF OTHER SPECIFIED BODY STRUCTURES: ICD-10-CM

## 2021-06-17 DIAGNOSIS — Z02.9 ENCOUNTER FOR ADMINISTRATIVE EXAMINATIONS, UNSPECIFIED: ICD-10-CM

## 2021-06-17 DIAGNOSIS — I48.0 PAROXYSMAL ATRIAL FIBRILLATION: ICD-10-CM

## 2021-06-17 DIAGNOSIS — I10 ESSENTIAL (PRIMARY) HYPERTENSION: ICD-10-CM

## 2021-06-17 DIAGNOSIS — E11.29 TYPE 2 DIABETES MELLITUS WITH OTHER DIABETIC KIDNEY COMPLICATION: ICD-10-CM

## 2021-06-17 DIAGNOSIS — Z85.038 PERSONAL HISTORY OF OTHER MALIGNANT NEOPLASM OF LARGE INTESTINE: ICD-10-CM

## 2021-06-17 DIAGNOSIS — I48.91 UNSPECIFIED ATRIAL FIBRILLATION: ICD-10-CM

## 2021-06-17 LAB
ALBUMIN SERPL ELPH-MCNC: 3.8 G/DL — SIGNIFICANT CHANGE UP (ref 3.3–5)
ALP SERPL-CCNC: 60 U/L — SIGNIFICANT CHANGE UP (ref 40–120)
ALT FLD-CCNC: 8 U/L — LOW (ref 10–45)
ANION GAP SERPL CALC-SCNC: 14 MMOL/L — SIGNIFICANT CHANGE UP (ref 5–17)
APPEARANCE UR: CLEAR — SIGNIFICANT CHANGE UP
AST SERPL-CCNC: 9 U/L — LOW (ref 10–40)
BACTERIA # UR AUTO: NEGATIVE — SIGNIFICANT CHANGE UP
BASOPHILS # BLD AUTO: 0.01 K/UL — SIGNIFICANT CHANGE UP (ref 0–0.2)
BASOPHILS NFR BLD AUTO: 0.1 % — SIGNIFICANT CHANGE UP (ref 0–2)
BILIRUB SERPL-MCNC: 0.3 MG/DL — SIGNIFICANT CHANGE UP (ref 0.2–1.2)
BILIRUB UR-MCNC: NEGATIVE — SIGNIFICANT CHANGE UP
BUN SERPL-MCNC: 51 MG/DL — HIGH (ref 7–23)
CALCIUM SERPL-MCNC: 9.4 MG/DL — SIGNIFICANT CHANGE UP (ref 8.4–10.5)
CHLORIDE SERPL-SCNC: 105 MMOL/L — SIGNIFICANT CHANGE UP (ref 96–108)
CO2 SERPL-SCNC: 18 MMOL/L — LOW (ref 22–31)
COLOR SPEC: SIGNIFICANT CHANGE UP
CREAT SERPL-MCNC: 1.44 MG/DL — HIGH (ref 0.5–1.3)
DIFF PNL FLD: NEGATIVE — SIGNIFICANT CHANGE UP
EOSINOPHIL # BLD AUTO: 0.05 K/UL — SIGNIFICANT CHANGE UP (ref 0–0.5)
EOSINOPHIL NFR BLD AUTO: 0.4 % — SIGNIFICANT CHANGE UP (ref 0–6)
EPI CELLS # UR: 1 /HPF — SIGNIFICANT CHANGE UP
GLUCOSE SERPL-MCNC: 162 MG/DL — HIGH (ref 70–99)
GLUCOSE UR QL: ABNORMAL
HCT VFR BLD CALC: 32.6 % — LOW (ref 34.5–45)
HGB BLD-MCNC: 10.8 G/DL — LOW (ref 11.5–15.5)
HYALINE CASTS # UR AUTO: 1 /LPF — SIGNIFICANT CHANGE UP (ref 0–2)
IMM GRANULOCYTES NFR BLD AUTO: 0.5 % — SIGNIFICANT CHANGE UP (ref 0–1.5)
KETONES UR-MCNC: NEGATIVE — SIGNIFICANT CHANGE UP
LEUKOCYTE ESTERASE UR-ACNC: ABNORMAL
LIDOCAIN IGE QN: 48 U/L — SIGNIFICANT CHANGE UP (ref 7–60)
LYMPHOCYTES # BLD AUTO: 1.44 K/UL — SIGNIFICANT CHANGE UP (ref 1–3.3)
LYMPHOCYTES # BLD AUTO: 11.1 % — LOW (ref 13–44)
MAGNESIUM SERPL-MCNC: 1.7 MG/DL — SIGNIFICANT CHANGE UP (ref 1.6–2.6)
MCHC RBC-ENTMCNC: 29.6 PG — SIGNIFICANT CHANGE UP (ref 27–34)
MCHC RBC-ENTMCNC: 33.1 GM/DL — SIGNIFICANT CHANGE UP (ref 32–36)
MCV RBC AUTO: 89.3 FL — SIGNIFICANT CHANGE UP (ref 80–100)
MONOCYTES # BLD AUTO: 1.23 K/UL — HIGH (ref 0–0.9)
MONOCYTES NFR BLD AUTO: 9.4 % — SIGNIFICANT CHANGE UP (ref 2–14)
NEUTROPHILS # BLD AUTO: 10.23 K/UL — HIGH (ref 1.8–7.4)
NEUTROPHILS NFR BLD AUTO: 78.5 % — HIGH (ref 43–77)
NITRITE UR-MCNC: NEGATIVE — SIGNIFICANT CHANGE UP
NRBC # BLD: 0 /100 WBCS — SIGNIFICANT CHANGE UP (ref 0–0)
NT-PROBNP SERPL-SCNC: 4538 PG/ML — HIGH (ref 0–300)
PH UR: 6 — SIGNIFICANT CHANGE UP (ref 5–8)
PLATELET # BLD AUTO: 395 K/UL — SIGNIFICANT CHANGE UP (ref 150–400)
POTASSIUM SERPL-MCNC: 4.3 MMOL/L — SIGNIFICANT CHANGE UP (ref 3.5–5.3)
POTASSIUM SERPL-SCNC: 4.3 MMOL/L — SIGNIFICANT CHANGE UP (ref 3.5–5.3)
PROT SERPL-MCNC: 6.3 G/DL — SIGNIFICANT CHANGE UP (ref 6–8.3)
PROT UR-MCNC: ABNORMAL
RBC # BLD: 3.65 M/UL — LOW (ref 3.8–5.2)
RBC # FLD: 12.5 % — SIGNIFICANT CHANGE UP (ref 10.3–14.5)
RBC CASTS # UR COMP ASSIST: 2 /HPF — SIGNIFICANT CHANGE UP (ref 0–4)
SARS-COV-2 RNA SPEC QL NAA+PROBE: SIGNIFICANT CHANGE UP
SODIUM SERPL-SCNC: 137 MMOL/L — SIGNIFICANT CHANGE UP (ref 135–145)
SP GR SPEC: 1.01 — SIGNIFICANT CHANGE UP (ref 1.01–1.02)
TROPONIN T, HIGH SENSITIVITY RESULT: 28 NG/L — SIGNIFICANT CHANGE UP (ref 0–51)
TROPONIN T, HIGH SENSITIVITY RESULT: 29 NG/L — SIGNIFICANT CHANGE UP (ref 0–51)
UROBILINOGEN FLD QL: NEGATIVE — SIGNIFICANT CHANGE UP
WBC # BLD: 13.02 K/UL — HIGH (ref 3.8–10.5)
WBC # FLD AUTO: 13.02 K/UL — HIGH (ref 3.8–10.5)
WBC UR QL: 5 /HPF — SIGNIFICANT CHANGE UP (ref 0–5)

## 2021-06-17 PROCEDURE — 99223 1ST HOSP IP/OBS HIGH 75: CPT

## 2021-06-17 PROCEDURE — 99285 EMERGENCY DEPT VISIT HI MDM: CPT

## 2021-06-17 PROCEDURE — 71046 X-RAY EXAM CHEST 2 VIEWS: CPT | Mod: 26

## 2021-06-17 PROCEDURE — 93010 ELECTROCARDIOGRAM REPORT: CPT

## 2021-06-17 RX ORDER — ATORVASTATIN CALCIUM 80 MG/1
40 TABLET, FILM COATED ORAL AT BEDTIME
Refills: 0 | Status: DISCONTINUED | OUTPATIENT
Start: 2021-06-17 | End: 2021-06-21

## 2021-06-17 RX ORDER — FUROSEMIDE 40 MG
20 TABLET ORAL DAILY
Refills: 0 | Status: DISCONTINUED | OUTPATIENT
Start: 2021-06-18 | End: 2021-06-19

## 2021-06-17 RX ORDER — SODIUM CHLORIDE 9 MG/ML
1000 INJECTION, SOLUTION INTRAVENOUS
Refills: 0 | Status: DISCONTINUED | OUTPATIENT
Start: 2021-06-17 | End: 2021-06-21

## 2021-06-17 RX ORDER — PANTOPRAZOLE SODIUM 20 MG/1
1 TABLET, DELAYED RELEASE ORAL
Qty: 0 | Refills: 0 | DISCHARGE

## 2021-06-17 RX ORDER — DAPAGLIFLOZIN 10 MG/1
1 TABLET, FILM COATED ORAL
Qty: 0 | Refills: 0 | DISCHARGE

## 2021-06-17 RX ORDER — DEXTROSE 50 % IN WATER 50 %
12.5 SYRINGE (ML) INTRAVENOUS ONCE
Refills: 0 | Status: DISCONTINUED | OUTPATIENT
Start: 2021-06-17 | End: 2021-06-21

## 2021-06-17 RX ORDER — DEXTROSE 50 % IN WATER 50 %
25 SYRINGE (ML) INTRAVENOUS ONCE
Refills: 0 | Status: DISCONTINUED | OUTPATIENT
Start: 2021-06-17 | End: 2021-06-21

## 2021-06-17 RX ORDER — AMLODIPINE BESYLATE 2.5 MG/1
10 TABLET ORAL DAILY
Refills: 0 | Status: DISCONTINUED | OUTPATIENT
Start: 2021-06-18 | End: 2021-06-21

## 2021-06-17 RX ORDER — SODIUM BICARBONATE 1 MEQ/ML
2 SYRINGE (ML) INTRAVENOUS
Qty: 0 | Refills: 0 | DISCHARGE

## 2021-06-17 RX ORDER — INSULIN LISPRO 100/ML
VIAL (ML) SUBCUTANEOUS AT BEDTIME
Refills: 0 | Status: DISCONTINUED | OUTPATIENT
Start: 2021-06-17 | End: 2021-06-21

## 2021-06-17 RX ORDER — SODIUM CHLORIDE 9 MG/ML
500 INJECTION INTRAMUSCULAR; INTRAVENOUS; SUBCUTANEOUS ONCE
Refills: 0 | Status: COMPLETED | OUTPATIENT
Start: 2021-06-17 | End: 2021-06-17

## 2021-06-17 RX ORDER — AMLODIPINE BESYLATE 2.5 MG/1
1 TABLET ORAL
Qty: 0 | Refills: 0 | DISCHARGE

## 2021-06-17 RX ORDER — APIXABAN 2.5 MG/1
5 TABLET, FILM COATED ORAL EVERY 12 HOURS
Refills: 0 | Status: DISCONTINUED | OUTPATIENT
Start: 2021-06-17 | End: 2021-06-21

## 2021-06-17 RX ORDER — METOPROLOL TARTRATE 50 MG
25 TABLET ORAL
Refills: 0 | Status: DISCONTINUED | OUTPATIENT
Start: 2021-06-17 | End: 2021-06-21

## 2021-06-17 RX ORDER — GLUCAGON INJECTION, SOLUTION 0.5 MG/.1ML
1 INJECTION, SOLUTION SUBCUTANEOUS ONCE
Refills: 0 | Status: DISCONTINUED | OUTPATIENT
Start: 2021-06-17 | End: 2021-06-21

## 2021-06-17 RX ORDER — ACETAMINOPHEN 500 MG
650 TABLET ORAL EVERY 6 HOURS
Refills: 0 | Status: DISCONTINUED | OUTPATIENT
Start: 2021-06-17 | End: 2021-06-21

## 2021-06-17 RX ORDER — CHLORTHALIDONE 50 MG
1 TABLET ORAL
Qty: 0 | Refills: 0 | DISCHARGE

## 2021-06-17 RX ORDER — INSULIN LISPRO 100/ML
VIAL (ML) SUBCUTANEOUS
Refills: 0 | Status: DISCONTINUED | OUTPATIENT
Start: 2021-06-17 | End: 2021-06-21

## 2021-06-17 RX ORDER — DEXTROSE 50 % IN WATER 50 %
15 SYRINGE (ML) INTRAVENOUS ONCE
Refills: 0 | Status: DISCONTINUED | OUTPATIENT
Start: 2021-06-17 | End: 2021-06-21

## 2021-06-17 RX ORDER — METFORMIN HYDROCHLORIDE 850 MG/1
1 TABLET ORAL
Qty: 0 | Refills: 0 | DISCHARGE

## 2021-06-17 RX ADMIN — SODIUM CHLORIDE 500 MILLILITER(S): 9 INJECTION INTRAMUSCULAR; INTRAVENOUS; SUBCUTANEOUS at 16:10

## 2021-06-17 RX ADMIN — ATORVASTATIN CALCIUM 40 MILLIGRAM(S): 80 TABLET, FILM COATED ORAL at 21:46

## 2021-06-17 NOTE — H&P ADULT - NSHPREVIEWOFSYSTEMS_GEN_ALL_CORE
No fever, no chills, no rigors.  No chest pain/pressure.  No palpitations.  NO abdominal pain, no red blood per rectum or melena.  No back pain, no tearing back pain.  NO rash.    NO joint pain.  B/L LE swelling.  No breast symptoms.  NO vaginal bleeding.  No suicidal or homicidal ideation.

## 2021-06-17 NOTE — H&P ADULT - NSICDXPASTMEDICALHX_GEN_ALL_CORE_FT
PAST MEDICAL HISTORY:  Diabetes Mellitus Type II, Uncontrolled     Herniated Disc     HTN - Hypertension     Hyperlipidemia     Obesity

## 2021-06-17 NOTE — ED PROVIDER NOTE - PROGRESS NOTE DETAILS
Marv Norton PA-C: pt w/ new onset afib w/ elevated BNP and PHAM. ENdorsed to hospitalist, will admit to [facility].

## 2021-06-17 NOTE — H&P ADULT - PROBLEM SELECTOR PLAN 5
See above.    Would consider contacting PCP in the AM to clarify patient's history--presumed to be in remission.

## 2021-06-17 NOTE — ED PROVIDER NOTE - OBJECTIVE STATEMENT
80 year old female with htn, hld, dm, here with one week of fatigue, generalized weakness. no cp. having sob on exertion. no fever, no chills. no abdominal pain.  no n/v. Patient states symptoms gets worse with exertion. 80 year old female with htn, hld, dm, here with one week of fatigue, generalized weakness. no cp.  no fever, no chills. no abdominal pain.  no n/v. Patient states symptoms gets worse with exertion. 80 year old female with htn, hld, dm, LE edema on lasix presents to ED, referred by UC w/ new onset afib. Pt endorses one week of fatigue, generalized weakness. Initially attributed her symptoms to completed a high dose steroid my on Tuesday. Does not follow with cardiology, last saw cards 2 years ago. no cp.  no fever, no chills. no abdominal pain.  no n/v.

## 2021-06-17 NOTE — ED ADULT NURSE NOTE - OBJECTIVE STATEMENT
80 year old female presented to ED via [facility] from Urgent Care with c/o of SOB x2 weeks, went to Urgent Care today for dyspnea on exertion. pmh HTN, DM. as per EMS 'pt is on water pill and was recently taken off of Lasix after being started on prednisone for chronic back pain'. pt referred to ED for evaluation by Urgent Care for new onset a-fib. pt denies CP, nausea/vomiting, numbness/tingling, fever, cough, chills, dizziness, headache, blurred vision, neuro intact. pt a&ox3, lung sounds clear, heart rate tachy ~110, EKG performed handed to MD, on cardiac monitor, abdomen soft nontender nondistended to palp. skin intact. IV in right AC 20G and patent. Will continue to monitor and assess while offering support and reassurance.

## 2021-06-17 NOTE — H&P ADULT - HISTORY OF PRESENT ILLNESS
NIGHT HOSPITALIST:   Patient UNKNOWN to me previously, assigned to me at this point via the ER to admit this 79 y/o F--followed by her office physicians above--patient with a history of essential HTN, hyperlipidemia, type 2 DM on oral Rx, history of colon CA presumed to be in remission, on sodium bicarbonate for control of hyperkalemia, with patient completing a Prednisone taper for persistent symptoms of LBP as an outpatient, but patient apparently with several weeks of poor exercise tolerance with progression for the past week of generalized fatigue, and exertional dyspnoea.   Patient was directed to an urgent care center with patient noted to be in presumed new onset atrial fibrillation and sent to the ER   No palpitations at present.  NO chest pain/pressure.  NO HA< no focal weakness.  Denies anorexia.

## 2021-06-17 NOTE — ED ADULT NURSE NOTE - CHPI ED NUR SYMPTOMS NEG
no back pain/no chest pain/no chills/no diaphoresis/no dizziness/no fever/no nausea/no syncope/no vomiting

## 2021-06-17 NOTE — H&P ADULT - NSHPLABSRESULTS_GEN_ALL_CORE
WBC 13.0    hgb 10.8    Platelets of 395K.    BNP 4538    Random glucose of 162.    Cr 1.1 (2011) >> 1.44    K+ 4.3    UA 30 protein.   Large glucose.  WBC 5.   Small leukocyte esterase.    COVID-19 PCR>>negative.    EKG tracing reviewed with atrial fibrillation at 100.    Chest radiograph with no infiltrate or effusion.  LEFT midlung radiopaque bullae (?) recommend CTT chest. WBC 13.0    hgb 10.8    Platelets of 395K.    BNP 4538    Random glucose of 162.    Cr 1.1 (2011) >> 1.44    K+ 4.3    UA 30 protein.   Large glucose.  WBC 5.   Small leukocyte esterase.    COVID-19 PCR>>negative.    EKG tracing reviewed with atrial fibrillation at 122.    Chest radiograph with no infiltrate or effusion.  LEFT midlung radiopaque bullae (?) recommend CTT chest.

## 2021-06-17 NOTE — H&P ADULT - REASON FOR ADMISSION
Referred from [facility] urgent care to the ER for dyspnea for one week and presumed new onset atrial fibrillation.

## 2021-06-17 NOTE — ED ADULT TRIAGE NOTE - CHIEF COMPLAINT QUOTE
generalized weakness. SOB worse with dyspnea. Seen at urgent care today, EKG shoed a-fib. Sent by urgent care via EMS.

## 2021-06-17 NOTE — H&P ADULT - ASSESSMENT
NIGHT HOSPITALIST:   Referral of patient to the ER with presumed new onset atrial fibrillation in the setting of patient with a complex medical history of type 2 DM on oral Rx, essential HTN, history of colon CA (unclear to history but presumed to be in remission), S/P recent completion of Prednisone taper for LBP, on sodium bicarbonate for hyperkalemia, with mild elevation of Cr from 1.1 to 1.44.    I have requested formal evaluation from Dr. Carr of cardiology this evening.    Admitted NIGHT HOSPITALIST:   Referral of patient to the ER with presumed new onset atrial fibrillation in the setting of patient with a complex medical history of type 2 DM on oral Rx, essential HTN, history of colon CA (unclear to history but presumed to be in remission), S/P recent completion of Prednisone taper for LBP, on sodium bicarbonate for hyperkalemia, with mild elevation of Cr from 1.1 to 1.44.    I have requested formal evaluation from Dr. Carr of cardiology this evening.    Admitted to telemetry.   Echo.  Troponin nondiagnostic.  Reviewed with Dr. Carr who will review with patient further diagnostic and therapeutic considerations, including full AC.    Will temporarily HOLD the oral DM2 Rx in favor of FS S/S for now.    CTT chest ordered for chest radiograph finding.   Would clarify with office physicians in the AM current colon CA status. NIGHT HOSPITALIST:   Referral of patient to the ER with presumed new onset atrial fibrillation in the setting of patient with a complex medical history of type 2 DM on oral Rx, essential HTN, history of colon CA (unclear to history but presumed to be in remission), S/P recent completion of Prednisone taper for LBP, on sodium bicarbonate for hyperkalemia, with mild elevation of Cr from 1.1 to 1.44.    I have requested formal evaluation from Dr. Carr of cardiology this evening.    Admitted to telemetry.   Echo.  Troponin nondiagnostic.  Reviewed with Dr. Carr who will review with patient further diagnostic and therapeutic considerations, including full AC.    Will temporarily HOLD the oral DM2 Rx in favor of FS S/S for now.    CTT chest ordered for chest radiograph finding.   Would clarify with office physicians in the AM current colon CA status.    Patient aware of course and agrees with plan/care as above.   Patient did not wish examiner to contact family at this hour.   Emotional support provided to patient.  Care reviewed with covering NP/PA for endorsement to my physician colleagues.    Jarred Pickens MD  [phone]

## 2021-06-17 NOTE — H&P ADULT - NSHPPHYSICALEXAM_GEN_ALL_CORE
Physical exam with an elderly, obese, chronically ill appearing, nontoxic F.    Afebrile.   HR  97 irregular.   RR 14   /76   98% on RA    HEENT< PERRL< EOMI  Neck supple  NO thyromegaly  Chest clear  Cor irregular  Declined breast exam  Abdomen soft nontender, normal bowel sounds, no rebound.  Skin dry  Ext poor nail hygiene.  2++ b/L LE oedema.  No ulcers noted.  Neurologic AxOx3.  Speech fluent.  cognition intact.  UE/LE 5/5.  No suicidal or homicidal ideation.   Affect neutral.

## 2021-06-17 NOTE — H&P ADULT - PROBLEM SELECTOR PLAN 7
Transitions of Care Status:  1.  Name of PCP:     Rad Bauer MD   [phone]  2.  PCP Contacted on Admission: [ ] Y    [x ] N    3.  PCP contacted at Discharge: [ ] Y    [ ] N    [ ] N/A  4.  Post-Discharge Appointment Date and Location:  5.  Summary of Handoff given to PCP:

## 2021-06-17 NOTE — H&P ADULT - PROBLEM SELECTOR PLAN 2
See above.   Will temporarily HOLD the chlorthalidone, lisinopril, metformin, Protonix.   Will temporarily HOLD the sodium bicarbonate tablets for now pending assessment of LV function.

## 2021-06-17 NOTE — ED ADULT TRIAGE NOTE - ADDITIONAL SAFETY/BANDS...
Pt presents to the ED via private vehicle accompanied wife with a chief complaint of increased SOB beginning 5 days ago. The patient reports hx of COPD and wears 3 lpm of O2 at baseline, however, over the last 5 days pt notes increased SOB at rest and with exertion. Accompanied by the SOB, the patient also complains of a change in his chronic productive cough (from thin clear/white sputum to thick white/green sputum), a fever since Tuesday, and generalized weakness. Pt denies CP, leg swelling, N/V/D, or any other associated symptoms.     Pt arrives through triage, ambulatory with steady gait wearing baseline 3 lpm of O2 via NC. Pt declining wheelchair back to ED room. Pt A&Ox4 appearing in mild respiratory distress.     Additional Safety/Bands:

## 2021-06-17 NOTE — H&P ADULT - NSHPSOURCEINFOTX_GEN_ALL_CORE
Waleen  Niecy  contacted but incomplete Medex lis.   Reviewed HIE note from Dr. Jason from 6/7/21 medication list and reviewed with patient via patient recall.  Patient did not wish examiner to contact family at this time. Niecy  contacted but incomplete Medex list.   Reviewed HIE note from Dr. Jason from 6/7/21 medication list and reviewed with patient via patient recall.  Patient did not wish examiner to contact family at this time.

## 2021-06-17 NOTE — ED PROVIDER NOTE - CARE PLAN
Principal Discharge DX:	Atrial fibrillation  Secondary Diagnosis:	PHAM (acute kidney injury)  Secondary Diagnosis:	Elevated brain natriuretic peptide (BNP) level

## 2021-06-17 NOTE — H&P ADULT - PROBLEM SELECTOR PLAN 1
See above.   Echo.  TSH sent.  Reviewed with Dr. Carr, who will review with patient further diagnostic and therapeutic considerations, including full AC.

## 2021-06-17 NOTE — ED PROVIDER NOTE - PMH
Diabetes Mellitus Type II, Uncontrolled    Herniated Disc    HTN - Hypertension    Hyperlipidemia    Obesity

## 2021-06-18 LAB
A1C WITH ESTIMATED AVERAGE GLUCOSE RESULT: 7.6 % — HIGH (ref 4–5.6)
ANION GAP SERPL CALC-SCNC: 13 MMOL/L — SIGNIFICANT CHANGE UP (ref 5–17)
APTT BLD: 27.7 SEC — SIGNIFICANT CHANGE UP (ref 27.5–35.5)
BASOPHILS # BLD AUTO: 0.01 K/UL — SIGNIFICANT CHANGE UP (ref 0–0.2)
BASOPHILS NFR BLD AUTO: 0.1 % — SIGNIFICANT CHANGE UP (ref 0–2)
BUN SERPL-MCNC: 38 MG/DL — HIGH (ref 7–23)
CALCIUM SERPL-MCNC: 8.9 MG/DL — SIGNIFICANT CHANGE UP (ref 8.4–10.5)
CHLORIDE SERPL-SCNC: 108 MMOL/L — SIGNIFICANT CHANGE UP (ref 96–108)
CO2 SERPL-SCNC: 19 MMOL/L — LOW (ref 22–31)
COVID-19 SPIKE DOMAIN AB INTERP: POSITIVE
COVID-19 SPIKE DOMAIN ANTIBODY RESULT: >250 U/ML — HIGH
CREAT ?TM UR-MCNC: 34 MG/DL — SIGNIFICANT CHANGE UP
CREAT ?TM UR-MCNC: 36 MG/DL — SIGNIFICANT CHANGE UP
CREAT SERPL-MCNC: 1.16 MG/DL — SIGNIFICANT CHANGE UP (ref 0.5–1.3)
EOSINOPHIL # BLD AUTO: 0.1 K/UL — SIGNIFICANT CHANGE UP (ref 0–0.5)
EOSINOPHIL NFR BLD AUTO: 1.1 % — SIGNIFICANT CHANGE UP (ref 0–6)
ESTIMATED AVERAGE GLUCOSE: 171 MG/DL — HIGH (ref 68–114)
GLUCOSE SERPL-MCNC: 159 MG/DL — HIGH (ref 70–99)
HCT VFR BLD CALC: 31 % — LOW (ref 34.5–45)
HGB BLD-MCNC: 10.3 G/DL — LOW (ref 11.5–15.5)
IMM GRANULOCYTES NFR BLD AUTO: 0.3 % — SIGNIFICANT CHANGE UP (ref 0–1.5)
INR BLD: 1.31 RATIO — HIGH (ref 0.88–1.16)
LYMPHOCYTES # BLD AUTO: 1.5 K/UL — SIGNIFICANT CHANGE UP (ref 1–3.3)
LYMPHOCYTES # BLD AUTO: 16.1 % — SIGNIFICANT CHANGE UP (ref 13–44)
MCHC RBC-ENTMCNC: 29.3 PG — SIGNIFICANT CHANGE UP (ref 27–34)
MCHC RBC-ENTMCNC: 33.2 GM/DL — SIGNIFICANT CHANGE UP (ref 32–36)
MCV RBC AUTO: 88.3 FL — SIGNIFICANT CHANGE UP (ref 80–100)
MICROALBUMIN UR-MCNC: 9.7 MG/DL — SIGNIFICANT CHANGE UP
MICROALBUMIN/CREAT UR-RTO: 286 MG/G — HIGH (ref 0–30)
MONOCYTES # BLD AUTO: 1.04 K/UL — HIGH (ref 0–0.9)
MONOCYTES NFR BLD AUTO: 11.1 % — SIGNIFICANT CHANGE UP (ref 2–14)
NEUTROPHILS # BLD AUTO: 6.66 K/UL — SIGNIFICANT CHANGE UP (ref 1.8–7.4)
NEUTROPHILS NFR BLD AUTO: 71.3 % — SIGNIFICANT CHANGE UP (ref 43–77)
NRBC # BLD: 0 /100 WBCS — SIGNIFICANT CHANGE UP (ref 0–0)
PLATELET # BLD AUTO: 345 K/UL — SIGNIFICANT CHANGE UP (ref 150–400)
POTASSIUM SERPL-MCNC: 4 MMOL/L — SIGNIFICANT CHANGE UP (ref 3.5–5.3)
POTASSIUM SERPL-SCNC: 4 MMOL/L — SIGNIFICANT CHANGE UP (ref 3.5–5.3)
PROT ?TM UR-MCNC: 19 MG/DL — HIGH (ref 0–12)
PROT/CREAT UR-RTO: 0.5 RATIO — HIGH (ref 0–0.2)
PROTHROM AB SERPL-ACNC: 15.5 SEC — HIGH (ref 10.6–13.6)
RBC # BLD: 3.51 M/UL — LOW (ref 3.8–5.2)
RBC # FLD: 12.5 % — SIGNIFICANT CHANGE UP (ref 10.3–14.5)
SARS-COV-2 IGG+IGM SERPL QL IA: >250 U/ML — HIGH
SARS-COV-2 IGG+IGM SERPL QL IA: POSITIVE
SODIUM SERPL-SCNC: 140 MMOL/L — SIGNIFICANT CHANGE UP (ref 135–145)
TSH SERPL-MCNC: 1.9 UIU/ML — SIGNIFICANT CHANGE UP (ref 0.27–4.2)
WBC # BLD: 9.34 K/UL — SIGNIFICANT CHANGE UP (ref 3.8–10.5)
WBC # FLD AUTO: 9.34 K/UL — SIGNIFICANT CHANGE UP (ref 3.8–10.5)

## 2021-06-18 PROCEDURE — 71250 CT THORAX DX C-: CPT | Mod: 26

## 2021-06-18 PROCEDURE — 99233 SBSQ HOSP IP/OBS HIGH 50: CPT

## 2021-06-18 RX ORDER — SODIUM BICARBONATE 1 MEQ/ML
1300 SYRINGE (ML) INTRAVENOUS THREE TIMES A DAY
Refills: 0 | Status: DISCONTINUED | OUTPATIENT
Start: 2021-06-18 | End: 2021-06-21

## 2021-06-18 RX ADMIN — Medication 1300 MILLIGRAM(S): at 21:18

## 2021-06-18 RX ADMIN — ATORVASTATIN CALCIUM 40 MILLIGRAM(S): 80 TABLET, FILM COATED ORAL at 21:19

## 2021-06-18 RX ADMIN — AMLODIPINE BESYLATE 10 MILLIGRAM(S): 2.5 TABLET ORAL at 05:33

## 2021-06-18 RX ADMIN — APIXABAN 5 MILLIGRAM(S): 2.5 TABLET, FILM COATED ORAL at 05:34

## 2021-06-18 RX ADMIN — Medication 25 MILLIGRAM(S): at 18:35

## 2021-06-18 RX ADMIN — Medication 1: at 07:54

## 2021-06-18 RX ADMIN — Medication 1: at 11:50

## 2021-06-18 RX ADMIN — Medication 25 MILLIGRAM(S): at 05:34

## 2021-06-18 RX ADMIN — APIXABAN 5 MILLIGRAM(S): 2.5 TABLET, FILM COATED ORAL at 18:34

## 2021-06-18 RX ADMIN — Medication 20 MILLIGRAM(S): at 05:34

## 2021-06-18 NOTE — PROGRESS NOTE ADULT - ASSESSMENT
80F with type 2 DM on oral Rx, essential HTN, history of colon CA (unclear to history but presumed to be in remission), S/P recent completion of Prednisone taper for LBP a/w presumed new onset atrial fibrillation in the setting of patient, on sodium bicarbonate for hyperkalemia, with mild elevation of Cr from 1.1 to 1.44.    Admitted to telemetry.   Echo.  Troponin nondiagnostic.     Will temporarily HOLD the oral DM2 Rx in favor of FS S/S for now.    CTT chest ordered for chest radiograph finding.   Would clarify with office physicians in the AM current colon CA status.    Patient aware of course and agrees with plan/care as above.   Patient did not wish examiner to contact family at this hour.   Emotional support provided to patient.  Care reviewed with covering NP/PA for endorsement to my physician colleagues.    Jarred Pickens MD  [phone] 80F with type 2 DM on oral Rx, essential HTN, history of colon CA (unclear to history but presumed to be in remission), S/P recent completion of Prednisone taper for LBP a/w presumed new onset atrial fibrillation in the setting of patient, on sodium bicarbonate for hyperkalemia, with mild elevation of Cr from 1.1 to 1.44.    Admitted to telemetry.   Echo.  Troponin nondiagnostic.

## 2021-06-18 NOTE — PROGRESS NOTE ADULT - SUBJECTIVE AND OBJECTIVE BOX
Patient is a 80y old  Female who presents with a chief complaint of Referred from [facility] urgent care to the ER for dyspnea for one week and presumed new onset atrial fibrillation. (18 Jun 2021 13:27)      INTERVAL HISTORY: feels ok     TELEMETRY Personally reviewed: afib    REVIEW OF SYSTEMS:   CONSTITUTIONAL: No weakness  EYES/ENT: No visual changes; No throat pain  Neck: No pain or stiffness  Respiratory: No cough, wheezing, No shortness of breath  CARDIOVASCULAR: no chest pain or palpitations  GASTROINTESTINAL: No abdominal pain, no nausea, vomiting or hematemesis  GENITOURINARY: No dysuria, frequency or hematuria  NEUROLOGICAL: No stroke like symptoms  SKIN: No rashes    MEDICATIONS:  amLODIPine   Tablet 10 milliGRAM(s) Oral daily  furosemide   Injectable 20 milliGRAM(s) IV Push daily  metoprolol tartrate 25 milliGRAM(s) Oral two times a day    PHYSICAL EXAM:  T(C): 36.9 (06-18-21 @ 11:23), Max: 37.4 (06-17-21 @ 15:22)  HR: 86 (06-18-21 @ 11:23) (78 - 103)  BP: 115/73 (06-18-21 @ 11:23) (115/73 - 149/87)  RR: 18 (06-18-21 @ 11:23) (17 - 18)  SpO2: 98% (06-18-21 @ 11:23) (98% - 100%)  Wt(kg): --  I&O's Summary    17 Jun 2021 07:01  -  18 Jun 2021 07:00  --------------------------------------------------------  IN: 0 mL / OUT: 400 mL / NET: -400 mL    18 Jun 2021 07:01  -  18 Jun 2021 15:20  --------------------------------------------------------  IN: 0 mL / OUT: 1 mL / NET: -1 mL    Appearance: In no distress	  HEENT:    PERRL, EOMI	  Cardiovascular:  S1 S2, No JVD  Respiratory: Lungs clear to auscultation	  Gastrointestinal:  Soft, Non-tender, + BS	  Vascularature:  No edema of LE  Psychiatric: Appropriate affect   Neuro: no acute focal deficits                         10.3   9.34  )-----------( 345      ( 18 Jun 2021 06:19 )             31.0     06-18    140  |  108  |  38<H>  ----------------------------<  159<H>  4.0   |  19<L>  |  1.16    Ca    8.9      18 Jun 2021 06:19  Mg     1.7     06-17    TPro  6.3  /  Alb  3.8  /  TBili  0.3  /  DBili  x   /  AST  9<L>  /  ALT  8<L>  /  AlkPhos  60  06-1    Labs personally reviewed    ASSESSMENT/PLAN: 	  81 y/o F patient with a history of essential HTN, hyperlipidemia, type 2 DM on oral Rx, history of colon CA presumed to be in remission, on sodium bicarbonate for control of hyperkalemia, with patient completing a Prednisone taper for persistent symptoms of LBP as an outpatient, but patient apparently with several weeks of poor exercise tolerance with progression for the past week of generalized fatigue, and exertional dyspnoea.   Patient was directed to an urgent care center with patient noted to be in presumed new onset atrial fibrillation and sent to the ER         Problem/Plan - 1:  ·  Problem: New onset atrial fibrillation.  Plan: CHADSVASc of at least 5, will start Eliquis 5mg BID  TSH wnl   EP eval appreciated- patient wishes to defer JUAN JOSÉ/ cardioversion at this time and prefers medical management at this time.   Rate control for now with Metoprolol 25mg BID  OP f/u with Dr. Carr for stress test  Echo to assess LV function      Problem/Plan - 2:  ·  Problem: Acute decompensated heart failure Plan: See above. likely 2/2 AF RVR, will diurese with Lasix 20mg IV daily and check TTE  may transition to PO diuretics    Problem/Plan - 3:  ·  Problem: Essential hypertension.  Plan: See above.  ON Norvasc. Hold ACE as in PHAM and to allow room for AV allan blockers  cr improved today     Problem/Plan - 4:  ·  Problem: Type 2 diabetes mellitus with other diabetic kidney complication, without long-term current use of insulin.  Plan: See above.  Metformin and Farxiga temporarily HELD for now.     FS, ISS for now    Problem/Plan - 5:  Problem: Abnormal chest x-ray. Plan: See above.  CTT chest no contrast ordered to exclude a mass.            Priscila Carr DO [facility]  Cardiovascular Medicine  [address] [place] [address], [address]  Office: [phone]  Cell: [phone]

## 2021-06-18 NOTE — CONSULT NOTE ADULT - SUBJECTIVE AND OBJECTIVE BOX
EP Attending    HISTORY OF PRESENT ILLNESS:   NIGHT HOSPITALIST:   Patient UNKNOWN to me previously, assigned to me at this point via the ER to admit this 81 y/o F--followed by her office physicians above--patient with a history of essential HTN, hyperlipidemia, type 2 DM on oral Rx, history of colon CA presumed to be in remission, on sodium bicarbonate for control of hyperkalemia, with patient completing a Prednisone taper for persistent symptoms of LBP as an outpatient, but patient apparently with several weeks of poor exercise tolerance with progression for the past week of generalized fatigue, and exertional dyspnoea.   Patient was directed to an urgent care center with patient noted to be in presumed new onset atrial fibrillation and sent to the ER   No palpitations at present.  NO chest pain/pressure.  NO HA< no focal weakness.  Denies anorexia. (17 Jun 2021 20:05)    Called by Dr Carr re: new patient with new diagnosis of atrial fibrillation.  Presented with weakness, fatigue, shortness of breath on ambulating across one room in her home.  No prior symptoms such as this.  Ongoing x 1 week.  No prior MI or CVA.  Has HTN and DM.  We discussed cardioversion for symptom-management of AFib, at length. She is very anxious about undergoing anesthesia for a transesophageal echocardiogram, and having her heart rhythm interfered with in any way.  An alternative is to be anticoagulated and try a rate-control medication like metoprolol or diltiazem.  She could return to the hospital for symptomatic management.  She should keep followup w/ Dr Carr.  She is naiive to anticoagulation and was started on apiaxban 5mg BID last night.  She is not experiencing classical angina, no pleuritic chest discomfort, no leg pain, no syncope or near-syncope.  A 10 pt ROS is otherwise engative.    PAST MEDICAL & SURGICAL HISTORY:  Diabetes Mellitus Type II, Uncontrolled    HTN - Hypertension    Hyperlipidemia    Obesity    Herniated Disc    S/P Cholecystectomy    H/O: Hysterectomy      MEDICATIONS  (STANDING):  amLODIPine   Tablet 10 milliGRAM(s) Oral daily  apixaban 5 milliGRAM(s) Oral every 12 hours  atorvastatin 40 milliGRAM(s) Oral at bedtime  dextrose 40% Gel 15 Gram(s) Oral once  dextrose 5%. 1000 milliLiter(s) (50 mL/Hr) IV Continuous <Continuous>  dextrose 5%. 1000 milliLiter(s) (100 mL/Hr) IV Continuous <Continuous>  dextrose 50% Injectable 25 Gram(s) IV Push once  dextrose 50% Injectable 12.5 Gram(s) IV Push once  dextrose 50% Injectable 25 Gram(s) IV Push once  furosemide   Injectable 20 milliGRAM(s) IV Push daily  glucagon  Injectable 1 milliGRAM(s) IntraMuscular once  insulin lispro (ADMELOG) corrective regimen sliding scale   SubCutaneous three times a day before meals  insulin lispro (ADMELOG) corrective regimen sliding scale   SubCutaneous at bedtime  metoprolol tartrate 25 milliGRAM(s) Oral two times a day    Allergies    No Known Allergies    Intolerances    FAMILY HISTORY:  Family history of alcoholism in father      Non-contributary for premature coronary disease or sudden cardiac death    SOCIAL HISTORY:    [ x] Non-smoker  [ ] Smoker  [ ] Alcohol    PHYSICAL EXAM:  T(C): 37.1 (06-18-21 @ 04:38), Max: 37.4 (06-17-21 @ 15:22)  HR: 78 (06-18-21 @ 04:38) (78 - 110)  BP: 131/77 (06-18-21 @ 04:38) (126/76 - 170/81)  RR: 18 (06-18-21 @ 04:38) (15 - 18)  SpO2: 99% (06-18-21 @ 04:38) (98% - 100%)  Wt(kg): --    Appearance: pleasant older woman in no acute distress, cooperative, oriented x3  HEENT:   Normal oral mucosa, PERRL, EOMI, limited dentition but no loose teeth.  Lymphatic: No lymphadenopathy , no edema  Cardiovascular: irregular S1 S2 (rate-controlled AFib), No JVD, No murmurs , Peripheral pulses palpable 2+ bilaterally  Respiratory: soft basilar rales, no wheezing, normal effort 	  Gastrointestinal:  Soft, Non-tender, + BS	  Skin: No rashes, No ecchymoses, No cyanosis, warm to touch  Musculoskeletal: Normal range of motion, normal strength  Psychiatry:  Mood & affect appropriate    TELEMETRY: AFib 120 --> 70bpm. 	    ECG: AF / RVR 	    	  LABS:	 	                          10.3   9.34  )-----------( 345      ( 18 Jun 2021 06:19 )             31.0     06-18    140  |  108  |  38<H>  ----------------------------<  159<H>  4.0   |  19<L>  |  1.16    Ca    8.9      18 Jun 2021 06:19  Mg     1.7     06-17    TPro  6.3  /  Alb  3.8  /  TBili  0.3  /  DBili  x   /  AST  9<L>  /  ALT  8<L>  /  AlkPhos  60  06-17    proBNP: Serum Pro-Brain Natriuretic Peptide: 4538 pg/mL (06-17 @ 15:15)    TSH: Thyroid Stimulating Hormone, Serum: 1.90 uIU/mL (06-17 @ 21:34)      ASSESSMENT/PLAN: 	80y Female with symptomatic new diagnosis of atrial fibrillation (persistent) of about 1 week duration.    Anticoagulation started with Apixaban 5mg BID.  High [id number] score.  Patient agreeable to lifelong anticoagulation to prevent a stroke.  Informed consent discussed re: UJAN JOSÉ/Cardioversion for rhythm control.  She prefers alternative of rate-control and early followup, as she is worried about side effects of anesthesia and potential small risk to her teeth from the JUAN JOSÉ probe.  Is worried that electrical cardioversion will somehow hurt her.  Is willing to re-consider JUAN JOSÉ/DCCV if unable to tolerate rate-control, or if other factors dictate more aggressive management of her arrhythmia.    Rate control with Metoprolol 25-50mg BID.  Echocardiogram.  Suspect diastolic CHF.    If LV EF is <50%, recommend ischemic workup, and I would re-state my recommendation for a rhythm control strategy as adjunctive treatment of CHF.    Will follow.    Kin Wall M.D.  Cardiac Electrophysiology    office [phone]  pager [phone]
DATE OF SERVICE: 06-17-21 @ 23:19    CHIEF COMPLAINT:Patient is a 80y old  Female who presents with a chief complaint of Referred from [facility] urgent care to the ER for dyspnea for one week and presumed new onset atrial fibrillation. (17 Jun 2021 20:05)      HISTORY OF PRESENT ILLNESS:HPI:    81 y/o F patient with a history of essential HTN, hyperlipidemia, type 2 DM on oral Rx, history of colon CA presumed to be in remission, on sodium bicarbonate for control of hyperkalemia, with patient completing a Prednisone taper for persistent symptoms of LBP as an outpatient, but patient apparently with several weeks of poor exercise tolerance with progression for the past week of generalized fatigue, and exertional dyspnoea.   Patient was directed to an urgent care center with patient noted to be in presumed new onset atrial fibrillation and sent to the ER   No palpitations at present.  NO chest pain/pressure.  NO HA< no focal weakness.  Denies anorexia. (17 Jun 2021 20:05)      PAST MEDICAL & SURGICAL HISTORY:  Diabetes Mellitus Type II, Uncontrolled    HTN - Hypertension    Hyperlipidemia    Obesity    Herniated Disc    S/P Cholecystectomy    H/O: Hysterectomy            MEDICATIONS:  apixaban 5 milliGRAM(s) Oral every 12 hours  metoprolol tartrate 25 milliGRAM(s) Oral two times a day        acetaminophen   Tablet .. 650 milliGRAM(s) Oral every 6 hours PRN      atorvastatin 40 milliGRAM(s) Oral at bedtime  dextrose 40% Gel 15 Gram(s) Oral once  dextrose 50% Injectable 25 Gram(s) IV Push once  dextrose 50% Injectable 12.5 Gram(s) IV Push once  dextrose 50% Injectable 25 Gram(s) IV Push once  glucagon  Injectable 1 milliGRAM(s) IntraMuscular once  insulin lispro (ADMELOG) corrective regimen sliding scale   SubCutaneous three times a day before meals  insulin lispro (ADMELOG) corrective regimen sliding scale   SubCutaneous at bedtime    dextrose 5%. 1000 milliLiter(s) IV Continuous <Continuous>  dextrose 5%. 1000 milliLiter(s) IV Continuous <Continuous>      FAMILY HISTORY:  Family history of alcoholism in father        Non-contributory    SOCIAL HISTORY:    not a smoker    Allergies    No Known Allergies    Intolerances    	    REVIEW OF SYSTEMS:  CONSTITUTIONAL: No fever  EYES: No eye pain, visual disturbances, or discharge  ENMT:  No difficulty hearing, tinnitus  NECK: No pain or stiffness  RESPIRATORY: No cough, wheezing,  CARDIOVASCULAR: No chest pain, +palpitations, no passing out, dizziness, or leg swelling  GASTROINTESTINAL:  No nausea, vomiting, diarrhea or constipation. No melena.  GENITOURINARY: No dysuria, hematuria  NEUROLOGICAL: No stroke like symptoms  SKIN: No burning or lesions   ENDOCRINE: No heat or cold intolerance  MUSCULOSKELETAL: No joint pain or swelling  PSYCHIATRIC: No  anxiety, mood swings  HEME/LYMPH: No bleeding gums  ALLERGY AND IMMUNOLOGIC: No hives or eczema	    All other ROS negative    PHYSICAL EXAM:  T(C): 37.3 (06-17-21 @ 20:29), Max: 37.4 (06-17-21 @ 15:22)  HR: 97 (06-17-21 @ 20:29) (86 - 110)  BP: 126/76 (06-17-21 @ 20:29) (126/76 - 170/81)  RR: 18 (06-17-21 @ 20:29) (15 - 18)  SpO2: 98% (06-17-21 @ 20:29) (98% - 100%)  Wt(kg): --  I&O's Summary      Appearance: Normal	  HEENT:   Normal oral mucosa, EOMI	  Cardiovascular:  S1 S2, No JVD,    Respiratory: Lungs clear to auscultation	  Psychiatry: Alert  Gastrointestinal:  Soft, Non-tender, + BS	  Skin: No rashes   Neurologic: Non-focal  Extremities:  No edema  Vascular: Peripheral pulses palpable    	    	  	  CARDIAC MARKERS:  Labs personally reviewed by me                                  10.8   13.02 )-----------( 395      ( 17 Jun 2021 15:15 )             32.6     06-17    137  |  105  |  51<H>  ----------------------------<  162<H>  4.3   |  18<L>  |  1.44<H>    Ca    9.4      17 Jun 2021 15:15  Mg     1.7     06-17    TPro  6.3  /  Alb  3.8  /  TBili  0.3  /  DBili  x   /  AST  9<L>  /  ALT  8<L>  /  AlkPhos  60  06-17          EKG: Personally reviewed by me - AF  Radiology: Personally reviewed by me -  Xray Chest 2 Views PA/Lat (06.17.21 @ 15:06)   Round radiopacity in the left midlung, possibly a bulla. A chest CT could be obtained as clinically warranted.         81 y/o F patient with a history of essential HTN, hyperlipidemia, type 2 DM on oral Rx, history of colon CA presumed to be in remission, on sodium bicarbonate for control of hyperkalemia, with patient completing a Prednisone taper for persistent symptoms of LBP as an outpatient, but patient apparently with several weeks of poor exercise tolerance with progression for the past week of generalized fatigue, and exertional dyspnoea.   Patient was directed to an urgent care center with patient noted to be in presumed new onset atrial fibrillation and sent to the ER         Problem/Plan - 1:  ·  Problem: New onset atrial fibrillation.  Plan: CHADSVASc of at least 5, will start Eliquis 5mg BID  Given new onset will discuss with EP plan for DCCV in AM, Dr Wall consulted  Echo to assess LV function  Check TSH   Rate control for now with Metoprolol 25mg BID    Problem/Plan - 2:  ·  Problem: Acute decompensated heart failure Plan: See above. likely 2/2 AF RVR, will diurese with Lasix 20mg IV daily and check TTE    Problem/Plan - 3:  ·  Problem: Essential hypertension.  Plan: See above.  ON Norvasc. Hold ACE as in PHAM and to allow room for AV allan blockers    Problem/Plan - 4:  ·  Problem: Type 2 diabetes mellitus with other diabetic kidney complication, without long-term current use of insulin.  Plan: See above.  Metformin and Farxiga temporarily HELD for now.   FS S/S for now.     Problem/Plan - 5:  Problem: Abnormal chest x-ray. Plan: See above.  CTT chest no contrast ordered to exclude a mass.      Differential diagnosis and plan of care discussed with patient after the evaluation. Counseling on diet, nutritional counseling, weight management, exercise and medication compliance was done.   Advanced care planning/advanced directives discussed with patient/family. DNR status including forceful chest compressions to attempt to restart the heart, ventilator support/artificial breathing, electric shock, artificial nutrition, health care proxy, Molst form all discussed with pt. Pt wishes to consider.          Paul Carr DO [facility]  Cardiovascular Medicine  [address] [place] [address], [address]  Office [phone]  Cell [phone]

## 2021-06-18 NOTE — PROGRESS NOTE ADULT - SUBJECTIVE AND OBJECTIVE BOX
[facility] Division of Hospital Medicine  Neymar Hassan MD  Pager (DENA-CM, 0G-3P): [phone]  Other Times:  [phone]    Patient is a 80y old  Female who presents with a chief complaint of Referred from [facility] urgent care to the ER for dyspnea for one week and presumed new onset atrial fibrillation. (2021 10:27)    SUBJECTIVE / OVERNIGHT EVENTS: palpitations resolved. no cp, improved sob. tele af 70-100s, @ 5AM up to 130s  ADDITIONAL REVIEW OF SYSTEMS:    MEDICATIONS  (STANDING):  amLODIPine   Tablet 10 milliGRAM(s) Oral daily  apixaban 5 milliGRAM(s) Oral every 12 hours  atorvastatin 40 milliGRAM(s) Oral at bedtime  dextrose 40% Gel 15 Gram(s) Oral once  dextrose 5%. 1000 milliLiter(s) (50 mL/Hr) IV Continuous <Continuous>  dextrose 5%. 1000 milliLiter(s) (100 mL/Hr) IV Continuous <Continuous>  dextrose 50% Injectable 25 Gram(s) IV Push once  dextrose 50% Injectable 12.5 Gram(s) IV Push once  dextrose 50% Injectable 25 Gram(s) IV Push once  furosemide   Injectable 20 milliGRAM(s) IV Push daily  glucagon  Injectable 1 milliGRAM(s) IntraMuscular once  insulin lispro (ADMELOG) corrective regimen sliding scale   SubCutaneous three times a day before meals  insulin lispro (ADMELOG) corrective regimen sliding scale   SubCutaneous at bedtime  metoprolol tartrate 25 milliGRAM(s) Oral two times a day    MEDICATIONS  (PRN):  acetaminophen   Tablet .. 650 milliGRAM(s) Oral every 6 hours PRN Temp greater or equal to 38C (100.4F), Mild Pain (1 - 3)      CAPILLARY BLOOD GLUCOSE      POCT Blood Glucose.: 180 mg/dL (2021 11:40)  POCT Blood Glucose.: 175 mg/dL (2021 07:48)  POCT Blood Glucose.: 231 mg/dL (2021 21:43)    I&O's Summary    2021 07:01  -  2021 07:00  --------------------------------------------------------  IN: 0 mL / OUT: 400 mL / NET: -400 mL    2021 07:01  -  2021 13:27  --------------------------------------------------------  IN: 0 mL / OUT: 1 mL / NET: -1 mL        PHYSICAL EXAM:  Vital Signs Last 24 Hrs  T(C): 36.9 (2021 11:23), Max: 37.4 (2021 15:22)  T(F): 98.5 (:), Max: 99.3 (2021 15:22)  HR: 86 (2021 11:23) (78 - 110)  BP: 115/73 (2021 11:23) (115/73 - 170/81)  BP(mean): --  RR: 18 (2021 11:23) (15 - 18)  SpO2: 98% (2021 11:23) (98% - 100%)  GEN NAD   HEENT MMM  Neck supple no thyromegaly  Chest CTAB  CV irregular, normal rate no murmur  Abdomen soft nontender, normal bowel sounds, no rebound.  Skin dry  Ext trace b/L LE edema.  No ulcers noted.  Neurologic AxOx3.  Speech fluent.  cognition intact. grossly nonfocal  Psych calm    LABS:                        10.3   9.34  )-----------( 345      ( 2021 06:19 )             31.0     -18    140  |  108  |  38<H>  ----------------------------<  159<H>  4.0   |  19<L>  |  1.16    Ca    8.9      2021 06:19  Mg     1.7     17    TPro  6.3  /  Alb  3.8  /  TBili  0.3  /  DBili  x   /  AST  9<L>  /  ALT  8<L>  /  AlkPhos  60  06-17    PT/INR - ( 2021 06:19 )   PT: 15.5 sec;   INR: 1.31 ratio         PTT - ( 2021 06:19 )  PTT:27.7 sec      Urinalysis Basic - ( 2021 16:21 )    Color: Light Yellow / Appearance: Clear / S.013 / pH: x  Gluc: x / Ketone: Negative  / Bili: Negative / Urobili: Negative   Blood: x / Protein: 30 mg/dL / Nitrite: Negative   Leuk Esterase: Small / RBC: 2 /hpf / WBC 5 /HPF   Sq Epi: x / Non Sq Epi: 1 /hpf / Bacteria: Negative    RADIOLOGY & ADDITIONAL TESTS:  Results Reviewed:   Imaging Personally Reviewed:  Electrocardiogram Personally Reviewed:    COORDINATION OF CARE: EP, sanchez apprec  Care Discussed with Consultants/Other Providers [Y/N]: PMERNA hernandez plan of care  Prior or Outpatient Records Reviewed [Y/N]:   [facility] Division of Hospital Medicine  Neymar Hassan MD  Pager (DENA-CM, 5S-8F): [phone]  Other Times:  [phone]    Patient is a 80y old  Female who presents with a chief complaint of Referred from [facility] urgent care to the ER for dyspnea for one week and presumed new onset atrial fibrillation. (2021 10:27)    SUBJECTIVE / OVERNIGHT EVENTS: palpitations resolved. no cp, improved sob. tele af 70-100s, @ 5AM up to 130s  ADDITIONAL REVIEW OF SYSTEMS:    MEDICATIONS  (STANDING):  amLODIPine   Tablet 10 milliGRAM(s) Oral daily  apixaban 5 milliGRAM(s) Oral every 12 hours  atorvastatin 40 milliGRAM(s) Oral at bedtime  dextrose 40% Gel 15 Gram(s) Oral once  dextrose 5%. 1000 milliLiter(s) (50 mL/Hr) IV Continuous <Continuous>  dextrose 5%. 1000 milliLiter(s) (100 mL/Hr) IV Continuous <Continuous>  dextrose 50% Injectable 25 Gram(s) IV Push once  dextrose 50% Injectable 12.5 Gram(s) IV Push once  dextrose 50% Injectable 25 Gram(s) IV Push once  furosemide   Injectable 20 milliGRAM(s) IV Push daily  glucagon  Injectable 1 milliGRAM(s) IntraMuscular once  insulin lispro (ADMELOG) corrective regimen sliding scale   SubCutaneous three times a day before meals  insulin lispro (ADMELOG) corrective regimen sliding scale   SubCutaneous at bedtime  metoprolol tartrate 25 milliGRAM(s) Oral two times a day    MEDICATIONS  (PRN):  acetaminophen   Tablet .. 650 milliGRAM(s) Oral every 6 hours PRN Temp greater or equal to 38C (100.4F), Mild Pain (1 - 3)      CAPILLARY BLOOD GLUCOSE      POCT Blood Glucose.: 180 mg/dL (2021 11:40)  POCT Blood Glucose.: 175 mg/dL (2021 07:48)  POCT Blood Glucose.: 231 mg/dL (2021 21:43)    I&O's Summary    2021 07:01  -  2021 07:00  --------------------------------------------------------  IN: 0 mL / OUT: 400 mL / NET: -400 mL    2021 07:01  -  2021 13:27  --------------------------------------------------------  IN: 0 mL / OUT: 1 mL / NET: -1 mL        PHYSICAL EXAM:  Vital Signs Last 24 Hrs  T(C): 36.9 (2021 11:23), Max: 37.4 (2021 15:22)  T(F): 98.5 (:), Max: 99.3 (2021 15:22)  HR: 86 (2021 11:23) (78 - 110)  BP: 115/73 (2021 11:23) (115/73 - 170/81)  BP(mean): --  RR: 18 (2021 11:23) (15 - 18)  SpO2: 98% (2021 11:23) (98% - 100%)  GEN NAD   HEENT MMM  Neck supple no thyromegaly  Chest CTAB  CV irregular, normal rate no murmur  Abdomen soft nontender, normal bowel sounds, no rebound.  Skin dry  Ext trace b/L LE edema.  No ulcers noted.  Neurologic AxOx3.  Speech fluent.  cognition intact. grossly nonfocal  Psych calm    LABS:                        10.3   9.34  )-----------( 345      ( 2021 06:19 )             31.0     -18    140  |  108  |  38<H>  ----------------------------<  159<H>  4.0   |  19<L>  |  1.16    Ca    8.9      2021 06:19  Mg     1.7     17    TPro  6.3  /  Alb  3.8  /  TBili  0.3  /  DBili  x   /  AST  9<L>  /  ALT  8<L>  /  AlkPhos  60  06-17    PT/INR - ( 2021 06:19 )   PT: 15.5 sec;   INR: 1.31 ratio         PTT - ( 2021 06:19 )  PTT:27.7 sec      Urinalysis Basic - ( 2021 16:21 )    Color: Light Yellow / Appearance: Clear / S.013 / pH: x  Gluc: x / Ketone: Negative  / Bili: Negative / Urobili: Negative   Blood: x / Protein: 30 mg/dL / Nitrite: Negative   Leuk Esterase: Small / RBC: 2 /hpf / WBC 5 /HPF   Sq Epi: x / Non Sq Epi: 1 /hpf / Bacteria: Negative    RADIOLOGY & ADDITIONAL TESTS:  Results Reviewed:   Imaging Personally Reviewed:  Electrocardiogram Personally Reviewed:    COORDINATION OF CARE: EP, cards apprec  Care Discussed with Consultants/Other Providers [Y/N]: outpt renal Dr Casie hernandez plan of care  Prior or Outpatient Records Reviewed [Y/N]:

## 2021-06-19 LAB
ANION GAP SERPL CALC-SCNC: 14 MMOL/L — SIGNIFICANT CHANGE UP (ref 5–17)
BUN SERPL-MCNC: 38 MG/DL — HIGH (ref 7–23)
CALCIUM SERPL-MCNC: 9 MG/DL — SIGNIFICANT CHANGE UP (ref 8.4–10.5)
CHLORIDE SERPL-SCNC: 103 MMOL/L — SIGNIFICANT CHANGE UP (ref 96–108)
CO2 SERPL-SCNC: 20 MMOL/L — LOW (ref 22–31)
CREAT SERPL-MCNC: 1.23 MG/DL — SIGNIFICANT CHANGE UP (ref 0.5–1.3)
GLUCOSE SERPL-MCNC: 193 MG/DL — HIGH (ref 70–99)
HCT VFR BLD CALC: 30.4 % — LOW (ref 34.5–45)
HGB BLD-MCNC: 10.5 G/DL — LOW (ref 11.5–15.5)
MCHC RBC-ENTMCNC: 30.4 PG — SIGNIFICANT CHANGE UP (ref 27–34)
MCHC RBC-ENTMCNC: 34.5 GM/DL — SIGNIFICANT CHANGE UP (ref 32–36)
MCV RBC AUTO: 88.1 FL — SIGNIFICANT CHANGE UP (ref 80–100)
NRBC # BLD: 0 /100 WBCS — SIGNIFICANT CHANGE UP (ref 0–0)
PLATELET # BLD AUTO: 337 K/UL — SIGNIFICANT CHANGE UP (ref 150–400)
POTASSIUM SERPL-MCNC: 3.8 MMOL/L — SIGNIFICANT CHANGE UP (ref 3.5–5.3)
POTASSIUM SERPL-SCNC: 3.8 MMOL/L — SIGNIFICANT CHANGE UP (ref 3.5–5.3)
RBC # BLD: 3.45 M/UL — LOW (ref 3.8–5.2)
RBC # FLD: 12.4 % — SIGNIFICANT CHANGE UP (ref 10.3–14.5)
SODIUM SERPL-SCNC: 137 MMOL/L — SIGNIFICANT CHANGE UP (ref 135–145)
WBC # BLD: 10.75 K/UL — HIGH (ref 3.8–10.5)
WBC # FLD AUTO: 10.75 K/UL — HIGH (ref 3.8–10.5)

## 2021-06-19 PROCEDURE — 99232 SBSQ HOSP IP/OBS MODERATE 35: CPT

## 2021-06-19 RX ADMIN — Medication 25 MILLIGRAM(S): at 17:12

## 2021-06-19 RX ADMIN — Medication 650 MILLIGRAM(S): at 05:56

## 2021-06-19 RX ADMIN — Medication 1300 MILLIGRAM(S): at 21:28

## 2021-06-19 RX ADMIN — Medication 650 MILLIGRAM(S): at 05:41

## 2021-06-19 RX ADMIN — ATORVASTATIN CALCIUM 40 MILLIGRAM(S): 80 TABLET, FILM COATED ORAL at 21:28

## 2021-06-19 RX ADMIN — Medication 1: at 12:04

## 2021-06-19 RX ADMIN — Medication 20 MILLIGRAM(S): at 05:35

## 2021-06-19 RX ADMIN — Medication 650 MILLIGRAM(S): at 19:55

## 2021-06-19 RX ADMIN — Medication 2: at 07:52

## 2021-06-19 RX ADMIN — Medication 25 MILLIGRAM(S): at 05:35

## 2021-06-19 RX ADMIN — Medication 1300 MILLIGRAM(S): at 13:20

## 2021-06-19 RX ADMIN — APIXABAN 5 MILLIGRAM(S): 2.5 TABLET, FILM COATED ORAL at 05:35

## 2021-06-19 RX ADMIN — Medication 650 MILLIGRAM(S): at 19:19

## 2021-06-19 RX ADMIN — Medication 1: at 17:10

## 2021-06-19 RX ADMIN — AMLODIPINE BESYLATE 10 MILLIGRAM(S): 2.5 TABLET ORAL at 05:35

## 2021-06-19 RX ADMIN — APIXABAN 5 MILLIGRAM(S): 2.5 TABLET, FILM COATED ORAL at 17:13

## 2021-06-19 RX ADMIN — Medication 1300 MILLIGRAM(S): at 05:35

## 2021-06-19 NOTE — PROGRESS NOTE ADULT - SUBJECTIVE AND OBJECTIVE BOX
Patient is a 80y old  Female who presents with a chief complaint of Referred from [facility] urgent care to the ER for dyspnea for one week and presumed new onset atrial fibrillation. (2021 10:05)      SUBJECTIVE / OVERNIGHT EVENTS: Pt in bed, feels well. Denies CP, dyspnea or palpitations. HR controlled overnight.     Vital Signs Last 24 Hrs  T(C): 37 (2021 11:49), Max: 37 (2021 11:49)  T(F): 98.6 (2021 11:49), Max: 98.6 (2021 11:49)  HR: 78 (2021 11:49) (68 - 88)  BP: 109/70 (2021 11:49) (104/52 - 123/68)  BP(mean): --  RR: 18 (2021 11:49) (18 - 18)  SpO2: 98% (2021 11:49) (98% - 100%)    MEDICATIONS  (STANDING):  amLODIPine   Tablet 10 milliGRAM(s) Oral daily  apixaban 5 milliGRAM(s) Oral every 12 hours  atorvastatin 40 milliGRAM(s) Oral at bedtime  dextrose 40% Gel 15 Gram(s) Oral once  dextrose 5%. 1000 milliLiter(s) (50 mL/Hr) IV Continuous <Continuous>  dextrose 5%. 1000 milliLiter(s) (100 mL/Hr) IV Continuous <Continuous>  dextrose 50% Injectable 25 Gram(s) IV Push once  dextrose 50% Injectable 12.5 Gram(s) IV Push once  dextrose 50% Injectable 25 Gram(s) IV Push once  furosemide   Injectable 20 milliGRAM(s) IV Push daily  glucagon  Injectable 1 milliGRAM(s) IntraMuscular once  insulin lispro (ADMELOG) corrective regimen sliding scale   SubCutaneous three times a day before meals  insulin lispro (ADMELOG) corrective regimen sliding scale   SubCutaneous at bedtime  metoprolol tartrate 25 milliGRAM(s) Oral two times a day  sodium bicarbonate 1300 milliGRAM(s) Oral three times a day    MEDICATIONS  (PRN):  acetaminophen   Tablet .. 650 milliGRAM(s) Oral every 6 hours PRN Temp greater or equal to 38C (100.4F), Mild Pain (1 - 3)        CAPILLARY BLOOD GLUCOSE      POCT Blood Glucose.: 157 mg/dL (2021 12:02)  POCT Blood Glucose.: 207 mg/dL (2021 07:34)  POCT Blood Glucose.: 240 mg/dL (2021 21:10)  POCT Blood Glucose.: 150 mg/dL (2021 16:31)    I&O's Summary    2021 07:01  -  2021 07:00  --------------------------------------------------------  IN: 240 mL / OUT: 1 mL / NET: 239 mL    2021 07:01  -  2021 15:03  --------------------------------------------------------  IN: 400 mL / OUT: 0 mL / NET: 400 mL        PHYSICAL EXAM:  GENERAL: NAD, well-developed  HEAD:  Atraumatic, Normocephalic  EYES: EOMI, PERRLA, conjunctiva and sclera clear  NECK: Supple, No JVD  CHEST/LUNG: Clear to auscultation bilaterally; No wheeze  HEART: Irregular rate and rhythm;   ABDOMEN: Soft, Nontender, Nondistended; Bowel sounds present  EXTREMITIES:  2+ Peripheral Pulses, No clubbing, cyanosis, or edema  PSYCH: AAOx3  NEUROLOGY: non-focal  SKIN: No rashes or lesions    LABS:                        10.5   10.75 )-----------( 337      ( 2021 06:57 )             30.4     06-19    137  |  103  |  38<H>  ----------------------------<  193<H>  3.8   |  20<L>  |  1.23    Ca    9.0      2021 06:57  Mg     1.7         TPro  6.3  /  Alb  3.8  /  TBili  0.3  /  DBili  x   /  AST  9<L>  /  ALT  8<L>  /  AlkPhos  60  06-17    PT/INR - ( 2021 06:19 )   PT: 15.5 sec;   INR: 1.31 ratio         PTT - ( 2021 06:19 )  PTT:27.7 sec      Urinalysis Basic - ( 2021 16:21 )    Color: Light Yellow / Appearance: Clear / S.013 / pH: x  Gluc: x / Ketone: Negative  / Bili: Negative / Urobili: Negative   Blood: x / Protein: 30 mg/dL / Nitrite: Negative   Leuk Esterase: Small / RBC: 2 /hpf / WBC 5 /HPF   Sq Epi: x / Non Sq Epi: 1 /hpf / Bacteria: Negative        RADIOLOGY & ADDITIONAL TESTS:    Imaging Personally Reviewed:    Consultant(s) Notes Reviewed:      Care Discussed with Consultants/Other Providers: NP

## 2021-06-19 NOTE — PROGRESS NOTE ADULT - ASSESSMENT
EP ATTENDING    Patient seen and examined. Agree with above. Continue eliquis for lifelong a/c, and metoprolol 50mg bid for rate control. She is currently refusing JUAN JOSÉ-DCCV. Get echo.      Ignacio Mon M.D., [facility]  Cardiac Electrophysiology  [facility]  [address] [place] [address], [address] [place] [address]  [email or web address]    office [phone]  pager [phone]

## 2021-06-19 NOTE — PROGRESS NOTE ADULT - ASSESSMENT
80F with type 2 DM on oral Rx, HTN, history of colon CA (unclear to history but presumed to be in remission), S/P recent completion of Prednisone taper for LBP a/w new onset atrial fibrillation, mild elevation of Cr from 1.1 to 1.44.

## 2021-06-19 NOTE — PROGRESS NOTE ADULT - SUBJECTIVE AND OBJECTIVE BOX
pt seen and examined, no complaints, ROS - .      acetaminophen   Tablet .. 650 milliGRAM(s) Oral every 6 hours PRN  amLODIPine   Tablet 10 milliGRAM(s) Oral daily  apixaban 5 milliGRAM(s) Oral every 12 hours  atorvastatin 40 milliGRAM(s) Oral at bedtime  dextrose 40% Gel 15 Gram(s) Oral once  dextrose 5%. 1000 milliLiter(s) IV Continuous <Continuous>  dextrose 5%. 1000 milliLiter(s) IV Continuous <Continuous>  dextrose 50% Injectable 25 Gram(s) IV Push once  dextrose 50% Injectable 12.5 Gram(s) IV Push once  dextrose 50% Injectable 25 Gram(s) IV Push once  furosemide   Injectable 20 milliGRAM(s) IV Push daily  glucagon  Injectable 1 milliGRAM(s) IntraMuscular once  insulin lispro (ADMELOG) corrective regimen sliding scale   SubCutaneous three times a day before meals  insulin lispro (ADMELOG) corrective regimen sliding scale   SubCutaneous at bedtime  metoprolol tartrate 25 milliGRAM(s) Oral two times a day  sodium bicarbonate 1300 milliGRAM(s) Oral three times a day                            10.5   10.75 )-----------( 337      ( 19 Jun 2021 06:57 )             30.4       Hemoglobin: 10.5 g/dL (06-19 @ 06:57)  Hemoglobin: 10.3 g/dL (06-18 @ 06:19)  Hemoglobin: 10.8 g/dL (06-17 @ 15:15)      06-19    137  |  103  |  38<H>  ----------------------------<  193<H>  3.8   |  20<L>  |  1.23    Ca    9.0      19 Jun 2021 06:57  Mg     1.7     06-17    TPro  6.3  /  Alb  3.8  /  TBili  0.3  /  DBili  x   /  AST  9<L>  /  ALT  8<L>  /  AlkPhos  60  06-17    Creatinine Trend: 1.23<--, 1.16<--, 1.44<--    COAGS:           T(C): 36.8 (06-19-21 @ 05:02), Max: 36.9 (06-18-21 @ 11:23)  HR: 81 (06-19-21 @ 05:02) (68 - 88)  BP: 113/58 (06-19-21 @ 05:02) (104/52 - 123/68)  RR: 18 (06-19-21 @ 05:02) (18 - 18)  SpO2: 98% (06-19-21 @ 05:02) (98% - 100%)  Wt(kg): --    I&O's Summary    18 Jun 2021 07:01  -  19 Jun 2021 07:00  --------------------------------------------------------  IN: 240 mL / OUT: 1 mL / NET: 239 mL       Appearance: pleasant older woman in no acute distress, cooperative, oriented x3  HEENT:   Normal oral mucosa, PERRL, EOMI, limited dentition but no loose teeth.  Lymphatic: No lymphadenopathy , no edema  Cardiovascular: irregular S1 S2 (rate-controlled AFib), No JVD, No murmurs , Peripheral pulses palpable 2+ bilaterally  Respiratory: soft basilar rales, no wheezing, normal effort 	  Gastrointestinal:  Soft, Non-tender, + BS	  Skin: No rashes, No ecchymoses, No cyanosis, warm to touch  Musculoskeletal: Normal range of motion, normal strength  Psychiatry:  Mood & affect appropriate    TELEMETRY: AFib 120 --> 70bpm. 	    ECG: AF / RVR 	    	  ASSESSMENT/PLAN: 	80y Female with symptomatic new diagnosis of atrial fibrillation (persistent) of about 1 week duration.    Anticoagulation started with Apixaban 5mg BID.  High [id number] score.  Patient agreeable to lifelong anticoagulation to prevent a stroke.  Informed consent discussed re: JUAN JOSÉ/Cardioversion for rhythm control.  She prefers alternative of rate-control and early followup, as she is worried about side effects of anesthesia and potential small risk to her teeth from the JUAN JOSÉ probe.  Is worried that electrical cardioversion will somehow hurt her.  Is willing to re-consider JUAN JOSÉ/DCCV if unable to tolerate rate-control, or if other factors dictate more aggressive management of her arrhythmia.    Rate control with Metoprolol 25-50mg BID.  Echocardiogram.  Suspect diastolic CHF.    If LV EF is <50%, recommend ischemic workup, and I would re-state my recommendation for a rhythm control strategy as adjunctive treatment of CHF.

## 2021-06-20 ENCOUNTER — TRANSCRIPTION ENCOUNTER (OUTPATIENT)
Age: 81
End: 2021-06-20

## 2021-06-20 LAB
-  AMIKACIN: SIGNIFICANT CHANGE UP
-  AMOXICILLIN/CLAVULANIC ACID: SIGNIFICANT CHANGE UP
-  AMPICILLIN/SULBACTAM: SIGNIFICANT CHANGE UP
-  AMPICILLIN: SIGNIFICANT CHANGE UP
-  AZTREONAM: SIGNIFICANT CHANGE UP
-  CEFAZOLIN: SIGNIFICANT CHANGE UP
-  CEFEPIME: SIGNIFICANT CHANGE UP
-  CEFOXITIN: SIGNIFICANT CHANGE UP
-  CEFTRIAXONE: SIGNIFICANT CHANGE UP
-  CIPROFLOXACIN: SIGNIFICANT CHANGE UP
-  ERTAPENEM: SIGNIFICANT CHANGE UP
-  GENTAMICIN: SIGNIFICANT CHANGE UP
-  IMIPENEM: SIGNIFICANT CHANGE UP
-  LEVOFLOXACIN: SIGNIFICANT CHANGE UP
-  MEROPENEM: SIGNIFICANT CHANGE UP
-  NITROFURANTOIN: SIGNIFICANT CHANGE UP
-  PIPERACILLIN/TAZOBACTAM: SIGNIFICANT CHANGE UP
-  TIGECYCLINE: SIGNIFICANT CHANGE UP
-  TOBRAMYCIN: SIGNIFICANT CHANGE UP
-  TRIMETHOPRIM/SULFAMETHOXAZOLE: SIGNIFICANT CHANGE UP
ANION GAP SERPL CALC-SCNC: 12 MMOL/L — SIGNIFICANT CHANGE UP (ref 5–17)
BUN SERPL-MCNC: 42 MG/DL — HIGH (ref 7–23)
CALCIUM SERPL-MCNC: 8.6 MG/DL — SIGNIFICANT CHANGE UP (ref 8.4–10.5)
CHLORIDE SERPL-SCNC: 104 MMOL/L — SIGNIFICANT CHANGE UP (ref 96–108)
CO2 SERPL-SCNC: 21 MMOL/L — LOW (ref 22–31)
CREAT SERPL-MCNC: 1.33 MG/DL — HIGH (ref 0.5–1.3)
CULTURE RESULTS: SIGNIFICANT CHANGE UP
GLUCOSE SERPL-MCNC: 191 MG/DL — HIGH (ref 70–99)
MAGNESIUM SERPL-MCNC: 1.7 MG/DL — SIGNIFICANT CHANGE UP (ref 1.6–2.6)
METHOD TYPE: SIGNIFICANT CHANGE UP
ORGANISM # SPEC MICROSCOPIC CNT: SIGNIFICANT CHANGE UP
ORGANISM # SPEC MICROSCOPIC CNT: SIGNIFICANT CHANGE UP
PHOSPHATE SERPL-MCNC: 2.9 MG/DL — SIGNIFICANT CHANGE UP (ref 2.5–4.5)
POTASSIUM SERPL-MCNC: 3.7 MMOL/L — SIGNIFICANT CHANGE UP (ref 3.5–5.3)
POTASSIUM SERPL-SCNC: 3.7 MMOL/L — SIGNIFICANT CHANGE UP (ref 3.5–5.3)
SODIUM SERPL-SCNC: 137 MMOL/L — SIGNIFICANT CHANGE UP (ref 135–145)
SPECIMEN SOURCE: SIGNIFICANT CHANGE UP

## 2021-06-20 PROCEDURE — 99232 SBSQ HOSP IP/OBS MODERATE 35: CPT

## 2021-06-20 PROCEDURE — 93306 TTE W/DOPPLER COMPLETE: CPT | Mod: 26

## 2021-06-20 RX ORDER — LANOLIN ALCOHOL/MO/W.PET/CERES
3 CREAM (GRAM) TOPICAL ONCE
Refills: 0 | Status: COMPLETED | OUTPATIENT
Start: 2021-06-20 | End: 2021-06-20

## 2021-06-20 RX ORDER — MAGNESIUM SULFATE 500 MG/ML
2 VIAL (ML) INJECTION ONCE
Refills: 0 | Status: COMPLETED | OUTPATIENT
Start: 2021-06-20 | End: 2021-06-20

## 2021-06-20 RX ORDER — LISINOPRIL 2.5 MG/1
5 TABLET ORAL DAILY
Refills: 0 | Status: DISCONTINUED | OUTPATIENT
Start: 2021-06-20 | End: 2021-06-21

## 2021-06-20 RX ADMIN — Medication 3 MILLIGRAM(S): at 23:43

## 2021-06-20 RX ADMIN — Medication 25 MILLIGRAM(S): at 17:06

## 2021-06-20 RX ADMIN — Medication 25 MILLIGRAM(S): at 05:19

## 2021-06-20 RX ADMIN — LISINOPRIL 5 MILLIGRAM(S): 2.5 TABLET ORAL at 17:20

## 2021-06-20 RX ADMIN — Medication 1: at 12:03

## 2021-06-20 RX ADMIN — APIXABAN 5 MILLIGRAM(S): 2.5 TABLET, FILM COATED ORAL at 05:19

## 2021-06-20 RX ADMIN — Medication 1300 MILLIGRAM(S): at 13:23

## 2021-06-20 RX ADMIN — APIXABAN 5 MILLIGRAM(S): 2.5 TABLET, FILM COATED ORAL at 17:06

## 2021-06-20 RX ADMIN — Medication 1: at 16:46

## 2021-06-20 RX ADMIN — Medication 50 GRAM(S): at 21:06

## 2021-06-20 RX ADMIN — AMLODIPINE BESYLATE 10 MILLIGRAM(S): 2.5 TABLET ORAL at 05:19

## 2021-06-20 RX ADMIN — ATORVASTATIN CALCIUM 40 MILLIGRAM(S): 80 TABLET, FILM COATED ORAL at 21:06

## 2021-06-20 RX ADMIN — Medication 1: at 07:46

## 2021-06-20 RX ADMIN — Medication 1300 MILLIGRAM(S): at 21:06

## 2021-06-20 RX ADMIN — Medication 1300 MILLIGRAM(S): at 05:19

## 2021-06-20 NOTE — DISCHARGE NOTE PROVIDER - HOSPITAL COURSE
Patient is a 80y old  Female who presents with a chief complaint of Referred from [facility] urgent care to the ER for dyspnea for one week and presumed new onset atrial fibrillation. (18 Jun 2021 13:27)     Problem/Plan - 1:   New onset atrial fibrillation.  CHADSVASc of at least 5, will start Eliquis 5mg BID  TSH wnl   EP eval appreciated- patient wishes to defer JUAN JOSÉ/ cardioversion at this time and prefers medical management    Metoprolol 25mg BID rate control   Follow up with Dr. Carr for stress test  Follow up with Dr. Mon , EP      Problem/Plan - 2: Acute decompensated heart failure   Lasix 20 mg daily   Daily weights  Follow up with Cardiology outpatient      Problem/Plan - 3:  Essential hypertension.   Continue Norvasc.   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    Continue  Metformin and Farxiga   Continue to monitor FS      Problem/Plan - 5:   Abnormal chest x-ray.    CTT chest no contrast ordered to exclude a mass:  4 cm air cyst in LLL lung    Follow up CT scan in 3 months     Follow up with PMD          Patient stable for discharge    Patient is a 80y old  Female who presents with a chief complaint of Referred from [facility] urgent care to the ER for dyspnea for one week and presumed new onset atrial fibrillation. (18 Jun 2021 13:27)     Problem/Plan - 1:   New onset atrial fibrillation.  CHADSVASc of at least 5, will start Eliquis 5mg BID  TSH wnl   EP eval appreciated- patient wishes to defer JUAN JOSÉ/ cardioversion at this time and prefers medical management    Metoprolol 25mg BID rate control   Follow up with Dr. Carr for stress test  Follow up with Dr. Mon , EP      Problem/Plan - 2: Acute decompensated diastolic heart failure   Lasix 20 mg daily   Daily weights  Follow up with Cardiology outpatient      Problem/Plan - 3:  Essential hypertension.   Continue Norvasc.   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    Continue  Metformin and Farxiga   Continue to monitor FS      Problem/Plan - 5:   Abnormal chest x-ray.    CTT chest no contrast ordered to exclude a mass:  4 cm air cyst in LLL lung    Follow up CT scan in 3 months     Follow up with PMD          Patient stable for discharge

## 2021-06-20 NOTE — PROGRESS NOTE ADULT - SUBJECTIVE AND OBJECTIVE BOX
Patient is a 80y old  Female who presents with a chief complaint of Referred from [facility] urgent care to the ER for dyspnea for one week and presumed new onset atrial fibrillation. (20 Jun 2021 14:05)      SUBJECTIVE / OVERNIGHT EVENTS: Pt feels well. Rate controlled overnight.     Vital Signs Last 24 Hrs  T(C): 36.8 (20 Jun 2021 11:40), Max: 37 (19 Jun 2021 20:11)  T(F): 98.3 (20 Jun 2021 11:40), Max: 98.6 (19 Jun 2021 20:11)  HR: 88 (20 Jun 2021 11:40) (72 - 88)  BP: 130/69 (20 Jun 2021 11:40) (120/69 - 130/69)  BP(mean): --  RR: 18 (20 Jun 2021 11:40) (18 - 18)  SpO2: 98% (20 Jun 2021 11:40) (98% - 98%)    MEDICATIONS  (STANDING):  amLODIPine   Tablet 10 milliGRAM(s) Oral daily  apixaban 5 milliGRAM(s) Oral every 12 hours  atorvastatin 40 milliGRAM(s) Oral at bedtime  dextrose 40% Gel 15 Gram(s) Oral once  dextrose 5%. 1000 milliLiter(s) (50 mL/Hr) IV Continuous <Continuous>  dextrose 5%. 1000 milliLiter(s) (100 mL/Hr) IV Continuous <Continuous>  dextrose 50% Injectable 25 Gram(s) IV Push once  dextrose 50% Injectable 12.5 Gram(s) IV Push once  dextrose 50% Injectable 25 Gram(s) IV Push once  glucagon  Injectable 1 milliGRAM(s) IntraMuscular once  insulin lispro (ADMELOG) corrective regimen sliding scale   SubCutaneous three times a day before meals  insulin lispro (ADMELOG) corrective regimen sliding scale   SubCutaneous at bedtime  metoprolol tartrate 25 milliGRAM(s) Oral two times a day  sodium bicarbonate 1300 milliGRAM(s) Oral three times a day    MEDICATIONS  (PRN):  acetaminophen   Tablet .. 650 milliGRAM(s) Oral every 6 hours PRN Temp greater or equal to 38C (100.4F), Mild Pain (1 - 3)        CAPILLARY BLOOD GLUCOSE      POCT Blood Glucose.: 186 mg/dL (20 Jun 2021 16:04)  POCT Blood Glucose.: 182 mg/dL (20 Jun 2021 11:56)  POCT Blood Glucose.: 194 mg/dL (20 Jun 2021 07:32)  POCT Blood Glucose.: 180 mg/dL (19 Jun 2021 21:11)    I&O's Summary    19 Jun 2021 07:01  -  20 Jun 2021 07:00  --------------------------------------------------------  IN: 640 mL / OUT: 0 mL / NET: 640 mL    20 Jun 2021 07:01  -  20 Jun 2021 17:00  --------------------------------------------------------  IN: 480 mL / OUT: 0 mL / NET: 480 mL        PHYSICAL EXAM:  GENERAL: NAD, well-developed  HEAD:  Atraumatic, Normocephalic  EYES: EOMI, PERRLA, conjunctiva and sclera clear  NECK: Supple, No JVD  CHEST/LUNG: Clear to auscultation bilaterally; No wheeze  HEART: Irregular rate and rhythm; No murmurs, rubs, or gallops  ABDOMEN: Soft, Nontender, Nondistended; Bowel sounds present  EXTREMITIES:  2+ Peripheral Pulses, No clubbing, cyanosis, or edema  PSYCH: AAOx3  NEUROLOGY: non-focal  SKIN: No rashes or lesions    LABS:                        10.5   10.75 )-----------( 337      ( 19 Jun 2021 06:57 )             30.4     06-20    137  |  104  |  42<H>  ----------------------------<  191<H>  3.7   |  21<L>  |  1.33<H>    Ca    8.6      20 Jun 2021 07:03  Phos  2.9     06-20  Mg     1.7     06-20                RADIOLOGY & ADDITIONAL TESTS:    Imaging Personally Reviewed:    Consultant(s) Notes Reviewed:      Care Discussed with Consultants/Other Providers: NP

## 2021-06-20 NOTE — DISCHARGE NOTE PROVIDER - NSDCFUSCHEDAPPT_GEN_ALL_CORE_FT
DARYL MENCHACA ; 07/16/2021 ; [facility] Med Nephro [facility] DARYL Soriano ; 08/24/2021 ; NPP OrthoSurg [address] [facility] DARYL MENCHACA ; 07/02/2021 ; NPP Med Nephro [facility] DARYL Soriano ; 07/16/2021 ; NPP Med Nephro [facility] DARYL Soriano ; 08/24/2021 ; NPP OrthoSurg [address] [facility]

## 2021-06-20 NOTE — PROGRESS NOTE ADULT - PROBLEM SELECTOR PLAN 6
CT chest- 4 cm air cyst within superior segment of the left lower lobe with peripheral wall thickening. Recommend follow-up chest CT in 3 months to determine stability.
CT chest no contrast ordered to exclude a mass.  pt is aware of abnormality but hasn't had it checked in over 2 years
CT chest- 4 cm air cyst within superior segment of the left lower lobe with peripheral wall thickening. Recommend follow-up chest CT in 3 months to determine stability.

## 2021-06-20 NOTE — DISCHARGE NOTE PROVIDER - CARE PROVIDERS DIRECT ADDRESSES
[email or web address],[email or web address] [email or web address],DirectAddress_Unknown,DirectAddress_Unknown

## 2021-06-20 NOTE — PROGRESS NOTE ADULT - PROBLEM SELECTOR PLAN 1
Rate controlled- 60s to 90s overnight, continue Lopressor. On Eliquis.     Echo pending.
rate currently acceptable  Echo.  TSH sent. Cards/EP apprec - eliquis started, metoprolol for rate control  attempting rate control strategy, JUAN JOSÉ/DCCV deferred at this time. will revisit if TTE demonstrates systolic dysfxn which would confer additional benefits of rhythm control    #acute on chronic diastolic heart failure - presumed  IV lasix for now  f/u cards/ep
Rate controlled- 60s to 90s overnight, continue Lopressor. On Eliquis.     Echo pending.

## 2021-06-20 NOTE — PROGRESS NOTE ADULT - PROBLEM SELECTOR PLAN 7
Transitions of Care Status:  1.  Name of PCP:     Rad Bauer MD   [phone], renal Dr Jason   2.  PCP Contacted on Admission: [x ] Y    [ ] N  - Dr Jason reached out to me  3.  PCP contacted at Discharge: [ ] Y    [ ] N    [ ] N/A  4.  Post-Discharge Appointment Date and Location:  5.  Summary of Handoff given to PCP:
Plan: Transitions of Care Status:  1.  Name of PCP:     Rad Bauer MD   [phone], renal Dr Jason   2.  PCP Contacted on Admission: [x ] Y    [ ] N  - Dr Jason     D/c planning pending echo
Plan: Transitions of Care Status:  1.  Name of PCP:     Rad Bauer MD   [phone], renal Dr Jason   2.  PCP Contacted on Admission: [x ] Y    [ ] N  - Dr Jason     D/c planning pending echo

## 2021-06-20 NOTE — DISCHARGE NOTE PROVIDER - CARE PROVIDER_API CALL
Rad Bauer)  Geriatric Medicine; [facility]; Internal Medicine  [address] [facility], [address]  [place] [address]  Phone: [phone]  Fax: [phone]  Follow Up Time: 1 week    Mark Jason)  Internal Medicine; Nephrology  [address] [place] [address]  Phone: [phone]  Fax: [phone]  Follow Up Time: 1 week    Ignacio Mon  CARDIAC ELECTROPHYSIOLOGY  [address] [place] [address] [address]  [place] [address]  Phone: [phone]  Fax: [phone]  Follow Up Time: 1 week   Rad Bauer)  Geriatric Medicine; [facility]; Internal Medicine  [address] [facility], [address]  [place] [address]  Phone: [phone]  Fax: [phone]  Follow Up Time: 2 weeks    Mark Jason)  Internal Medicine; Nephrology  [address] [place] [address]  Phone: [phone]  Fax: [phone]  Follow Up Time: 1 week    Ignacio Mon  CARDIAC ELECTROPHYSIOLOGY  [address] [place] [address] [address]  [place] [address]  Phone: [phone]  Fax: [phone]  Follow Up Time: 1 week    Paul Carr (DO)  Cardiovascular Disease; Internal Medicine; Nuclear Cardiology  [address] [facility], [address]  [place] [address]  Phone: [phone]  Fax: [phone]  Follow Up Time: 1 week

## 2021-06-20 NOTE — PROGRESS NOTE ADULT - SUBJECTIVE AND OBJECTIVE BOX
pt seen and examined, no complaints, ROS - .         acetaminophen   Tablet .. 650 milliGRAM(s) Oral every 6 hours PRN  amLODIPine   Tablet 10 milliGRAM(s) Oral daily  apixaban 5 milliGRAM(s) Oral every 12 hours  atorvastatin 40 milliGRAM(s) Oral at bedtime  dextrose 40% Gel 15 Gram(s) Oral once  dextrose 5%. 1000 milliLiter(s) IV Continuous <Continuous>  dextrose 5%. 1000 milliLiter(s) IV Continuous <Continuous>  dextrose 50% Injectable 25 Gram(s) IV Push once  dextrose 50% Injectable 12.5 Gram(s) IV Push once  dextrose 50% Injectable 25 Gram(s) IV Push once  glucagon  Injectable 1 milliGRAM(s) IntraMuscular once  insulin lispro (ADMELOG) corrective regimen sliding scale   SubCutaneous three times a day before meals  insulin lispro (ADMELOG) corrective regimen sliding scale   SubCutaneous at bedtime  metoprolol tartrate 25 milliGRAM(s) Oral two times a day  sodium bicarbonate 1300 milliGRAM(s) Oral three times a day                            10.5   10.75 )-----------( 337      ( 19 Jun 2021 06:57 )             30.4       Hemoglobin: 10.5 g/dL (06-19 @ 06:57)  Hemoglobin: 10.3 g/dL (06-18 @ 06:19)  Hemoglobin: 10.8 g/dL (06-17 @ 15:15)      06-19    137  |  103  |  38<H>  ----------------------------<  193<H>  3.8   |  20<L>  |  1.23    Ca    9.0      19 Jun 2021 06:57      Creatinine Trend: 1.23<--, 1.16<--, 1.44<--    COAGS:           T(C): 37 (06-19-21 @ 20:11), Max: 37 (06-19-21 @ 11:49)  HR: 80 (06-19-21 @ 20:11) (78 - 81)  BP: 121/72 (06-19-21 @ 20:11) (109/70 - 121/72)  RR: 18 (06-19-21 @ 20:11) (18 - 18)  SpO2: 98% (06-19-21 @ 20:11) (98% - 98%)  Wt(kg): --    I&O's Summary    18 Jun 2021 07:01  -  19 Jun 2021 07:00  --------------------------------------------------------  IN: 240 mL / OUT: 1 mL / NET: 239 mL    19 Jun 2021 07:01  -  20 Jun 2021 04:39  --------------------------------------------------------  IN: 640 mL / OUT: 0 mL / NET: 640 mL       Appearance: pleasant older woman in no acute distress, cooperative, oriented x3  HEENT:   Normal oral mucosa, PERRL, EOMI, limited dentition but no loose teeth.  Lymphatic: No lymphadenopathy , no edema  Cardiovascular: irregular S1 S2 (rate-controlled AFib), No JVD, No murmurs , Peripheral pulses palpable 2+ bilaterally  Respiratory: soft basilar rales, no wheezing, normal effort 	  Gastrointestinal:  Soft, Non-tender, + BS	  Skin: No rashes, No ecchymoses, No cyanosis, warm to touch  Musculoskeletal: Normal range of motion, normal strength  Psychiatry:  Mood & affect appropriate    TELEMETRY: AFib 120 --> 70bpm. 	    ECG: AF / RVR 	    	  ASSESSMENT/PLAN: 	80y Female with symptomatic new diagnosis of atrial fibrillation (persistent) of about 1 week duration.    Anticoagulation started with Apixaban 5mg BID.  High [id number] score.  Patient agreeable to lifelong anticoagulation to prevent a stroke.  Informed consent discussed re: JUAN JOSÉ/Cardioversion for rhythm control.  She prefers alternative of rate-control and early followup, as she is worried about side effects of anesthesia and potential small risk to her teeth from the JUAN JOSÉ probe.  Is worried that electrical cardioversion will somehow hurt her.  Is willing to re-consider JUAN JOSÉ/DCCV if unable to tolerate rate-control, or if other factors dictate more aggressive management of her arrhythmia.    Rate control with Metoprolol 25-50mg BID.  Echocardiogram.  Suspect diastolic CHF.    If LV EF is <50%, recommend ischemic workup, and I would re-state my recommendation for a rhythm control strategy as adjunctive treatment of CHF.

## 2021-06-20 NOTE — PROGRESS NOTE ADULT - PROBLEM SELECTOR PROBLEM 4
Type 2 diabetes mellitus with other diabetic kidney complication, without long-term current use of insulin

## 2021-06-20 NOTE — PROGRESS NOTE ADULT - ASSESSMENT
EP ATTENDING    Patient seen and examined. Agree with above. Continue eliquis for lifelong a/c, and metoprolol 50mg bid for rate control. She is currently refusing JUAN JOSÉ-DCCV. Get echo.      Ignacio Mon M.D., [facility]  Cardiac Electrophysiology  [facility]  [address] [place] [address], [address] [place] [address]  [email or web address]    office [phone]  pager [phone]

## 2021-06-20 NOTE — DISCHARGE NOTE PROVIDER - NSDCCPCAREPLAN_GEN_ALL_CORE_FT
PRINCIPAL DISCHARGE DIAGNOSIS  Diagnosis: Atrial fibrillation  Assessment and Plan of Treatment: Atrial fibrillation is the most common heart rhythm problem & has the risk of stroke & heart attack  It helps if you control your blood pressure, not drink more than 1-2 alcohol drinks per day, cut down on caffeine, getting treatment for over active thyroid gland, & getting exercise  Call your doctor if you feel your heart racing or beating unusually, chest tightness or pain, lightheaded, faint, shortness of breath especially with exercise  It is important to take your heart medication as prescribed  You are be on anticoagulation which is very important to take as directed      SECONDARY DISCHARGE DIAGNOSES  Diagnosis: PHAM (acute kidney injury)  Assessment and Plan of Treatment: Resolved    Diagnosis: Elevated brain natriuretic peptide (BNP) level  Assessment and Plan of Treatment: Euvolemix, off lasix     PRINCIPAL DISCHARGE DIAGNOSIS  Diagnosis: Atrial fibrillation  Assessment and Plan of Treatment: Atrial fibrillation is the most common heart rhythm problem & has the risk of stroke & heart attack  It helps if you control your blood pressure, not drink more than 1-2 alcohol drinks per day, cut down on caffeine, getting treatment for over active thyroid gland, & getting exercise  Call your doctor if you feel your heart racing or beating unusually, chest tightness or pain, lightheaded, faint, shortness of breath especially with exercise  It is important to take your heart medication as prescribed  You are be on anticoagulation which is very important to take as directed      SECONDARY DISCHARGE DIAGNOSES  Diagnosis: PHAM (acute kidney injury)  Assessment and Plan of Treatment: Resolved    Diagnosis: Elevated brain natriuretic peptide (BNP) level  Assessment and Plan of Treatment: Euvolemix, off lasix    Diagnosis: Type 2 diabetes mellitus  Assessment and Plan of Treatment: HgA1C this admission.  Make sure you get your HgA1c checked every three months.  If you take oral diabetes medications, check your blood glucose two times a day.  If you take insulin, check your blood glucose before meals and at bedtime.  It's important not to skip any meals.  Keep a log of your blood glucose results and always take it with you to your doctor appointments.  Keep a list of your current medications including injectables and over the counter medications and bring this medication list with you to all your doctor appointments.  If you have not seen your opthalmologist this year call for appointment.  Check your feet daily for redness, sores, or openings. Do not self treat. If no improvement in two days call your primary care physician for an appointment.  Low blood sugar (hypoglycemia) is a blood sugar below 70mg/dl. Check your blood sugar if you feel signs/symptoms of hypoglycemia. If your blood sugar is below 70 take 15 grams of carbohydrates (ex 4 oz of apple juice, 3-4 glucosr tablets, or 4-6 oz of regular soda) wait 15 minutes and repeat blood sugar to make sure it comes up above 70.  If your blood sugar is above 70 and you are due for a meal, have a meal.  If you are not due for a meal have a snack.  This snack helps keeps your blood sugar at a safe range.

## 2021-06-20 NOTE — DISCHARGE NOTE PROVIDER - NSDCCAREPROVSEEN_GEN_ALL_CORE_FT
Ivone Martinez Michelle, Ivone Mon, Ignacio Jason, Mark Barajas, Pablo Carr, Paul Wall, Kin UMANZOR Michelle, Ivone Mon, Ignacio Jason, Mark Barajas, Pablo Carr, Paul Wall, Kin Hassan, Neymar

## 2021-06-20 NOTE — DISCHARGE NOTE PROVIDER - REASON FOR ADMISSION
Referred from [facility] urgent care to the ER for dyspnea for one week and presumed new onset atrial fibrillation.

## 2021-06-20 NOTE — DISCHARGE NOTE PROVIDER - PROVIDER TOKENS
PROVIDER:[TOKEN:[[id number]],FOLLOWUP:[1 week]],PROVIDER:[[id number]],FOLLOWUP:[1 week]],PROVIDER:[TOKEN:[[id number]],FOLLOWUP:[1 week]] PROVIDER:[TOKEN:[[id number]],FOLLOWUP:[2 weeks]],PROVIDER:[TOKEN:[[id number]],FOLLOWUP:[1 week]],PROVIDER:[TOKEN:[[id number]],FOLLOWUP:[1 week]],PROVIDER:[TOKEN:[[id number]],FOLLOWUP:[1 week]]

## 2021-06-20 NOTE — PROGRESS NOTE ADULT - PROBLEM SELECTOR PLAN 4
On sliding scale insulin. A1C 7.6.
On sliding scale insulin. A1C 7.6.
See above.  Metformin and Farxiga temporarily HELD for now.   FS S/S for now.

## 2021-06-20 NOTE — PROGRESS NOTE ADULT - PROBLEM SELECTOR PLAN 3
On Norvasc. Lisinopril held on admission will resume at a lower dose. On Norvasc. Lisinopril held on admission, will resume at a lower dose.

## 2021-06-20 NOTE — PROGRESS NOTE ADULT - PROBLEM SELECTOR PLAN 2
improving s/p lasix  temporarily HOLDing the chlorthalidone, lisinopril, metformin, Protonix.  resume home bicarb for renal protection - low bicarb noted  cont to monitor
CKD 3, Cr stable.
CKD 3, Cr now improved and stable.

## 2021-06-20 NOTE — DISCHARGE NOTE PROVIDER - NSDCMRMEDTOKEN_GEN_ALL_CORE_FT
chlorthalidone 25 mg oral tablet: 1 tab(s) orally once a day  Crestor 10 mg oral tablet: 1 tab(s) orally once a day  Farxiga 5 mg oral tablet: 1 tab(s) orally once a day  lisinopril 10 mg oral tablet: 1 tab(s) orally once a day  metFORMIN 500 mg oral tablet: 1 tab(s) orally 2 times a day  Norvasc 10 mg oral tablet: 1 tab(s) orally once a day  prednisone taper:   Protonix 40 mg oral delayed release tablet: 1 tab(s) orally once a day  sodium bicarbonate 650 mg oral tablet: 2 tab(s) orally 3 times a day   acetaminophen 325 mg oral tablet: 2 tab(s) orally every 6 hours, As needed, Temp greater or equal to 38C (100.4F), Mild Pain (1 - 3)  apixaban 5 mg oral tablet: 1 tab(s) orally every 12 hours  atorvastatin 40 mg oral tablet: 1 tab(s) orally once a day (at bedtime)  chlorthalidone 25 mg oral tablet: 1 tab(s) orally once a day  Farxiga 5 mg oral tablet: 1 tab(s) orally once a day  lisinopril 5 mg oral tablet: 1 tab(s) orally once a day  metFORMIN 500 mg oral tablet: 1 tab(s) orally 2 times a day  metoprolol tartrate 25 mg oral tablet: 1 tab(s) orally 2 times a day  Norvasc 10 mg oral tablet: 1 tab(s) orally once a day  prednisone taper:   Protonix 40 mg oral delayed release tablet: 1 tab(s) orally once a day  sodium bicarbonate 650 mg oral tablet: 2 tab(s) orally 3 times a day   acetaminophen 325 mg oral tablet: 2 tab(s) orally every 6 hours, As needed, Temp greater or equal to 38C (100.4F), Mild Pain (1 - 3)  apixaban 5 mg oral tablet: 1 tab(s) orally every 12 hours  atorvastatin 40 mg oral tablet: 1 tab(s) orally once a day (at bedtime)  chlorthalidone 25 mg oral tablet: 1 tab(s) orally once a day  Farxiga 5 mg oral tablet: 1 tab(s) orally once a day  lisinopril 5 mg oral tablet: 1 tab(s) orally once a day  metFORMIN 500 mg oral tablet: 1 tab(s) orally 2 times a day  metoprolol tartrate 25 mg oral tablet: 1 tab(s) orally 2 times a day  Norvasc 10 mg oral tablet: 1 tab(s) orally once a day  Protonix 40 mg oral delayed release tablet: 1 tab(s) orally once a day  sodium bicarbonate 650 mg oral tablet: 2 tab(s) orally 3 times a day  test strips (per patient&#x27;s insurance): 1 application subcutaneously 4 times a day. ** Compatible with patient&#x27;s glucometer **

## 2021-06-21 ENCOUNTER — TRANSCRIPTION ENCOUNTER (OUTPATIENT)
Age: 81
End: 2021-06-21

## 2021-06-21 VITALS
RESPIRATION RATE: 18 BRPM | SYSTOLIC BLOOD PRESSURE: 114 MMHG | HEART RATE: 64 BPM | DIASTOLIC BLOOD PRESSURE: 82 MMHG | OXYGEN SATURATION: 100 % | TEMPERATURE: 98 F

## 2021-06-21 LAB
ANION GAP SERPL CALC-SCNC: 13 MMOL/L — SIGNIFICANT CHANGE UP (ref 5–17)
BUN SERPL-MCNC: 44 MG/DL — HIGH (ref 7–23)
CALCIUM SERPL-MCNC: 9 MG/DL — SIGNIFICANT CHANGE UP (ref 8.4–10.5)
CHLORIDE SERPL-SCNC: 100 MMOL/L — SIGNIFICANT CHANGE UP (ref 96–108)
CO2 SERPL-SCNC: 22 MMOL/L — SIGNIFICANT CHANGE UP (ref 22–31)
CREAT SERPL-MCNC: 1.3 MG/DL — SIGNIFICANT CHANGE UP (ref 0.5–1.3)
GLUCOSE SERPL-MCNC: 192 MG/DL — HIGH (ref 70–99)
HCT VFR BLD CALC: 30.6 % — LOW (ref 34.5–45)
HGB BLD-MCNC: 10.1 G/DL — LOW (ref 11.5–15.5)
MCHC RBC-ENTMCNC: 29.6 PG — SIGNIFICANT CHANGE UP (ref 27–34)
MCHC RBC-ENTMCNC: 33 GM/DL — SIGNIFICANT CHANGE UP (ref 32–36)
MCV RBC AUTO: 89.7 FL — SIGNIFICANT CHANGE UP (ref 80–100)
NRBC # BLD: 0 /100 WBCS — SIGNIFICANT CHANGE UP (ref 0–0)
PLATELET # BLD AUTO: 292 K/UL — SIGNIFICANT CHANGE UP (ref 150–400)
POTASSIUM SERPL-MCNC: 4.1 MMOL/L — SIGNIFICANT CHANGE UP (ref 3.5–5.3)
POTASSIUM SERPL-SCNC: 4.1 MMOL/L — SIGNIFICANT CHANGE UP (ref 3.5–5.3)
RBC # BLD: 3.41 M/UL — LOW (ref 3.8–5.2)
RBC # FLD: 12.4 % — SIGNIFICANT CHANGE UP (ref 10.3–14.5)
SODIUM SERPL-SCNC: 135 MMOL/L — SIGNIFICANT CHANGE UP (ref 135–145)
WBC # BLD: 10.69 K/UL — HIGH (ref 3.8–10.5)
WBC # FLD AUTO: 10.69 K/UL — HIGH (ref 3.8–10.5)

## 2021-06-21 PROCEDURE — 99239 HOSP IP/OBS DSCHRG MGMT >30: CPT

## 2021-06-21 PROCEDURE — 80053 COMPREHEN METABOLIC PANEL: CPT

## 2021-06-21 PROCEDURE — 83880 ASSAY OF NATRIURETIC PEPTIDE: CPT

## 2021-06-21 PROCEDURE — U0003: CPT

## 2021-06-21 PROCEDURE — 85025 COMPLETE CBC W/AUTO DIFF WBC: CPT

## 2021-06-21 PROCEDURE — 83735 ASSAY OF MAGNESIUM: CPT

## 2021-06-21 PROCEDURE — 81001 URINALYSIS AUTO W/SCOPE: CPT

## 2021-06-21 PROCEDURE — 87186 SC STD MICRODIL/AGAR DIL: CPT

## 2021-06-21 PROCEDURE — 85730 THROMBOPLASTIN TIME PARTIAL: CPT

## 2021-06-21 PROCEDURE — 83690 ASSAY OF LIPASE: CPT

## 2021-06-21 PROCEDURE — 87086 URINE CULTURE/COLONY COUNT: CPT

## 2021-06-21 PROCEDURE — 80048 BASIC METABOLIC PNL TOTAL CA: CPT

## 2021-06-21 PROCEDURE — 84100 ASSAY OF PHOSPHORUS: CPT

## 2021-06-21 PROCEDURE — 84443 ASSAY THYROID STIM HORMONE: CPT

## 2021-06-21 PROCEDURE — 85027 COMPLETE CBC AUTOMATED: CPT

## 2021-06-21 PROCEDURE — 82570 ASSAY OF URINE CREATININE: CPT

## 2021-06-21 PROCEDURE — 83036 HEMOGLOBIN GLYCOSYLATED A1C: CPT

## 2021-06-21 PROCEDURE — 86769 SARS-COV-2 COVID-19 ANTIBODY: CPT

## 2021-06-21 PROCEDURE — 82043 UR ALBUMIN QUANTITATIVE: CPT

## 2021-06-21 PROCEDURE — 71250 CT THORAX DX C-: CPT

## 2021-06-21 PROCEDURE — 99285 EMERGENCY DEPT VISIT HI MDM: CPT

## 2021-06-21 PROCEDURE — 84156 ASSAY OF PROTEIN URINE: CPT

## 2021-06-21 PROCEDURE — 85610 PROTHROMBIN TIME: CPT

## 2021-06-21 PROCEDURE — 93306 TTE W/DOPPLER COMPLETE: CPT

## 2021-06-21 PROCEDURE — 71046 X-RAY EXAM CHEST 2 VIEWS: CPT

## 2021-06-21 PROCEDURE — 84484 ASSAY OF TROPONIN QUANT: CPT

## 2021-06-21 PROCEDURE — 82962 GLUCOSE BLOOD TEST: CPT

## 2021-06-21 RX ORDER — ATORVASTATIN CALCIUM 80 MG/1
1 TABLET, FILM COATED ORAL
Qty: 0 | Refills: 0 | DISCHARGE
Start: 2021-06-21

## 2021-06-21 RX ORDER — METOPROLOL TARTRATE 50 MG
1 TABLET ORAL
Qty: 60 | Refills: 0
Start: 2021-06-21 | End: 2021-07-20

## 2021-06-21 RX ORDER — LISINOPRIL 2.5 MG/1
1 TABLET ORAL
Qty: 0 | Refills: 0 | DISCHARGE

## 2021-06-21 RX ORDER — LISINOPRIL 2.5 MG/1
1 TABLET ORAL
Qty: 30 | Refills: 0
Start: 2021-06-21 | End: 2021-07-20

## 2021-06-21 RX ORDER — LISINOPRIL 2.5 MG/1
1 TABLET ORAL
Qty: 0 | Refills: 0 | DISCHARGE
Start: 2021-06-21

## 2021-06-21 RX ORDER — ROSUVASTATIN CALCIUM 5 MG/1
1 TABLET ORAL
Qty: 0 | Refills: 0 | DISCHARGE

## 2021-06-21 RX ORDER — ATORVASTATIN CALCIUM 80 MG/1
1 TABLET, FILM COATED ORAL
Qty: 30 | Refills: 0
Start: 2021-06-21 | End: 2021-07-20

## 2021-06-21 RX ORDER — APIXABAN 2.5 MG/1
1 TABLET, FILM COATED ORAL
Qty: 60 | Refills: 0
Start: 2021-06-21 | End: 2021-07-20

## 2021-06-21 RX ORDER — METOPROLOL TARTRATE 50 MG
1 TABLET ORAL
Qty: 0 | Refills: 0 | DISCHARGE
Start: 2021-06-21

## 2021-06-21 RX ORDER — APIXABAN 2.5 MG/1
1 TABLET, FILM COATED ORAL
Qty: 0 | Refills: 0 | DISCHARGE
Start: 2021-06-21

## 2021-06-21 RX ORDER — ACETAMINOPHEN 500 MG
2 TABLET ORAL
Qty: 0 | Refills: 0 | DISCHARGE
Start: 2021-06-21

## 2021-06-21 RX ADMIN — LISINOPRIL 5 MILLIGRAM(S): 2.5 TABLET ORAL at 05:43

## 2021-06-21 RX ADMIN — Medication 650 MILLIGRAM(S): at 05:43

## 2021-06-21 RX ADMIN — Medication 650 MILLIGRAM(S): at 06:36

## 2021-06-21 RX ADMIN — Medication 3: at 11:37

## 2021-06-21 RX ADMIN — Medication 1: at 07:42

## 2021-06-21 RX ADMIN — APIXABAN 5 MILLIGRAM(S): 2.5 TABLET, FILM COATED ORAL at 05:39

## 2021-06-21 RX ADMIN — Medication 25 MILLIGRAM(S): at 05:40

## 2021-06-21 RX ADMIN — AMLODIPINE BESYLATE 10 MILLIGRAM(S): 2.5 TABLET ORAL at 05:40

## 2021-06-21 RX ADMIN — Medication 1300 MILLIGRAM(S): at 05:39

## 2021-06-21 NOTE — PROGRESS NOTE ADULT - SUBJECTIVE AND OBJECTIVE BOX
Patient is a 80y old  Female who presents with a chief complaint of Referred from [facility] urgent care to the ER for dyspnea for one week and presumed new onset atrial fibrillation. (20 Jun 2021 17:00)      INTERVAL HISTORY: feels ok - seen earlier this am     TELEMETRY Personally reviewed:   AF 70-90s     REVIEW OF SYSTEMS:   CONSTITUTIONAL: No weakness  EYES/ENT: No visual changes; No throat pain  Neck: No pain or stiffness  Respiratory: No cough, wheezing, No shortness of breath  CARDIOVASCULAR: no chest pain or palpitations  GASTROINTESTINAL: No abdominal pain, no nausea, vomiting or hematemesis  GENITOURINARY: No dysuria, frequency or hematuria  NEUROLOGICAL: No stroke like symptoms  SKIN: No rashes  	  MEDICATIONS:  amLODIPine   Tablet 10 milliGRAM(s) Oral daily  lisinopril 5 milliGRAM(s) Oral daily  metoprolol tartrate 25 milliGRAM(s) Oral two times a day    PHYSICAL EXAM:  T(C): 36.5 (06-21-21 @ 11:37), Max: 36.9 (06-20-21 @ 20:38)  HR: 64 (06-21-21 @ 11:37) (64 - 85)  BP: 114/82 (06-21-21 @ 11:37) (108/63 - 118/62)  RR: 18 (06-21-21 @ 11:37) (18 - 18)  SpO2: 100% (06-21-21 @ 11:37) (95% - 100%)  Wt(kg): --  I&O's Summary    20 Jun 2021 07:01  -  21 Jun 2021 07:00  --------------------------------------------------------  IN: 720 mL / OUT: 0 mL / NET: 720 mL    21 Jun 2021 07:01  -  21 Jun 2021 14:39  --------------------------------------------------------  IN: 480 mL / OUT: 0 mL / NET: 480 mL      Appearance: In no distress	  HEENT:    PERRL, EOMI	  Cardiovascular:  S1 S2, No JVD  Respiratory: Lungs clear to auscultation	  Gastrointestinal:  Soft, Non-tender, + BS	  Vascularature:  No edema of LE  Psychiatric: Appropriate affect   Neuro: no acute focal deficits                        10.1   10.69 )-----------( 292      ( 21 Jun 2021 06:55 )             30.6     06-21    135  |  100  |  44<H>  ----------------------------<  192<H>  4.1   |  22  |  1.30    Ca    9.0      21 Jun 2021 06:55  Phos  2.9     06-20  Mg     1.7     06-20    Labs personally reviewed  < from: Transthoracic Echocardiogram (06.20.21 @ 14:38) >  EF (Visual Estimate): 60 %  ---------------------------------------    < end of copied text >  < from: Transthoracic Echocardiogram (06.20.21 @ 14:38) >  1. Increased relative wall thickness with normal left  ventricular mass index, consistent with concentric left  ventricular remodeling.  2. Endocardium not well visualized; grossly normal left  ventricular systolic function.  3. Increased E/e'  is consistent with elevated left  ventricular filling pressure.  4. Normal right ventricular size and function.  5. Estimated pulmonary artery systolic pressure equals 29  mm Hg, assuming right atrial pressure equals 8  mm Hg,  consistent with normal pulmonarypressures.  ------------------------------------------------------------------------  Confirmed on  6/21/2021 - 05:37:41 by Miley Rosas M.D.FAS    < end of copied text >    ASSESSMENT/PLAN: 	  79 y/o F patient with a history of essential HTN, hyperlipidemia, type 2 DM on oral Rx, history of colon CA presumed to be in remission, on sodium bicarbonate for control of hyperkalemia, with patient completing a Prednisone taper for persistent symptoms of LBP as an outpatient, but patient apparently with several weeks of poor exercise tolerance with progression for the past week of generalized fatigue, and exertional dyspnoea.   Patient was directed to an urgent care center with patient noted to be in presumed new onset atrial fibrillation and sent to the ER         Problem/Plan - 1:  ·  Problem: New onset atrial fibrillation.  Plan: CHADSVASc of at least 5, will start Eliquis 5mg BID  TSH wnl   EP eval appreciated- patient wishes to defer JUAN JOSÉ/ cardioversion at this time and prefers medical management at this time.   Rate control for now with Metoprolol 25mg BID  OP f/u with Dr. Carr for stress test  Echo - normal LV function    Problem/Plan - 2:  ·  Problem: Acute decompensated heart failure Plan: See above. likely 2/2 AF RVR, will diurese with Lasix 20mg IV daily, now improved   may transition to PO diuretics     Problem/Plan - 3:  ·  Problem: Essential hypertension.  Plan: See above.  ON Norvasc. Hold ACE as in PHAM and to allow room for AV allan blockers  cr improved     Problem/Plan - 4:  ·  Problem: Type 2 diabetes mellitus with other diabetic kidney complication, without long-term current use of insulin.  Plan: See above.  Metformin and Farxiga temporarily HELD for now.     FS, ISS for now    Problem/Plan - 5:  Problem: Abnormal chest x-ray. Plan: See above.  CTT chest no contrast ordered to exclude a mass.      OP f/u with Dr. Carr for stress test    Priscila Carr DO [facility]  Cardiovascular Medicine  [address] [place] [address], [address]  Office: [phone]  Cell: [phone]

## 2021-06-21 NOTE — CHART NOTE - NSCHARTNOTEFT_GEN_A_CORE
Patient seen and examined. Tele AF 60-110s. Rate generally controlled on present metoprolol dose  Reviewing TTE pt is stable for dc with rate control, started on eliquis for new AF  outpatient f/u w cards & EP  diuresis previously stopped, pt tolerating well - Cr improved. Diastolic dysfxn on TTE. Trace pitting edema - outpatient f/u of diuresis mgmt - was related to AF RVR  pt concerned about FS - Noted FS 250s - acceptable for dc today with resumption of home meds for better control  Transitions of Care Status:  1.  Name of PCP: Georgina Bauer  2.  PCP Contacted on Admission: [ ] Y    [ ] N    3.  PCP contacted at Discharge: [ ] Y    [ ] N    [ ] N/A TO BE CONTACTED VIA EMAIL WITH HOSPITAL COURSE AND FU  4.  Post-Discharge Appointment Date and Location:  5.  Summary of Handoff given to PCP:    38 minutes spent orchestrating discharge

## 2021-06-21 NOTE — DISCHARGE NOTE NURSING/CASE MANAGEMENT/SOCIAL WORK - PATIENT PORTAL LINK FT
You can access the FollowMyHealth Patient Portal offered by [facility] by registering at the following website: [email or web address]. By joining [facility]’s FollowMyHealth portal, you will also be able to view your health information using other applications (apps) compatible with our system.

## 2021-06-21 NOTE — PROGRESS NOTE ADULT - REASON FOR ADMISSION
Referred from [facility] urgent care to the ER for dyspnea for one week and presumed new onset atrial fibrillation.
Referred from [facility] urgent care to the ER for dyspnea for one week and presumed new onset atrial fibrillation.
Referred from [facility] urgent care to the ER for dyspnea for one week and presumed new onset atrial fibrillation.
Referred from [facility] urgent care to the ER for dyspnea for one week and presumed new onset atrial fibrillation.
Referred from [facility] urgent care to the ER for dyspnea for one week and presumed new onset atrial fibrillation.
Referred from [facility] urgent care to the ER for dyspnea for one week and presumed new onset atrial fibrillation.
Referred from [facility] urgent care to the ER for dyspnea for one week and presumed new onset atrial fibrillation.
Referred from [facility] urgent care to the ER for dyspnea for one week and presumed new onset atrial fibrillation.

## 2021-06-21 NOTE — PROGRESS NOTE ADULT - SUBJECTIVE AND OBJECTIVE BOX
pt seen and examined, no complaints, ROS - .    seen ambulating on 6Tower with no apparent difficulties.  feeling better in rate-controlled AFib.    acetaminophen   Tablet .. 650 milliGRAM(s) Oral every 6 hours PRN  amLODIPine   Tablet 10 milliGRAM(s) Oral daily  apixaban 5 milliGRAM(s) Oral every 12 hours  atorvastatin 40 milliGRAM(s) Oral at bedtime  dextrose 40% Gel 15 Gram(s) Oral once  dextrose 5%. 1000 milliLiter(s) IV Continuous <Continuous>  dextrose 5%. 1000 milliLiter(s) IV Continuous <Continuous>  dextrose 50% Injectable 25 Gram(s) IV Push once  dextrose 50% Injectable 12.5 Gram(s) IV Push once  dextrose 50% Injectable 25 Gram(s) IV Push once  glucagon  Injectable 1 milliGRAM(s) IntraMuscular once  insulin lispro (ADMELOG) corrective regimen sliding scale   SubCutaneous three times a day before meals  insulin lispro (ADMELOG) corrective regimen sliding scale   SubCutaneous at bedtime  lisinopril 5 milliGRAM(s) Oral daily  metoprolol tartrate 25 milliGRAM(s) Oral two times a day  sodium bicarbonate 1300 milliGRAM(s) Oral three times a day                            10.1   10.69 )-----------( 292      ( 21 Jun 2021 06:55 )             30.6       06-21    135  |  100  |  44<H>  ----------------------------<  192<H>  4.1   |  22  |  1.30    Ca    9.0      21 Jun 2021 06:55  Phos  2.9     06-20  Mg     1.7     06-20    T(C): 36.5 (06-21-21 @ 11:37), Max: 36.9 (06-20-21 @ 20:38)  HR: 64 (06-21-21 @ 11:37) (64 - 85)  BP: 114/82 (06-21-21 @ 11:37) (108/63 - 118/62)  RR: 18 (06-21-21 @ 11:37) (18 - 18)  SpO2: 100% (06-21-21 @ 11:37) (95% - 100%)  Wt(kg): --    I&O's Summary    20 Jun 2021 07:01  -  21 Jun 2021 07:00  --------------------------------------------------------  IN: 720 mL / OUT: 0 mL / NET: 720 mL    21 Jun 2021 07:01  -  21 Jun 2021 15:20  --------------------------------------------------------  IN: 480 mL / OUT: 0 mL / NET: 480 mL       Appearance: pleasant older woman in no acute distress, cooperative, oriented x3  HEENT:   Normal oral mucosa, PERRL, EOMI, limited dentition but no loose teeth.  Lymphatic: No lymphadenopathy , no edema  Cardiovascular: irregular S1 S2 (rate-controlled AFib), No JVD, No murmurs , Peripheral pulses palpable 2+ bilaterally  Respiratory: soft basilar rales, no wheezing, normal effort 	  Gastrointestinal:  Soft, Non-tender, + BS	  Skin: No rashes, No ecchymoses, No cyanosis, warm to touch  Musculoskeletal: Normal range of motion, normal strength  Psychiatry:  Mood & affect appropriate    TELEMETRY: AFib 120 --> 70bpm. 	    ECG: AF / RVR 	  Echo: normal EF, mild LVH.  	  ASSESSMENT/PLAN: 	80y Female with symptomatic new diagnosis of atrial fibrillation (persistent) of about 1 week duration.    Anticoagulation with Apixaban 5mg BID.  High [id number] score.  Patient agreeable to lifelong anticoagulation to prevent a stroke.  Rate control with Metoprolol tartrate BID.  Doing well on 25mg BID dose.  Echocardiogram with normal EF and mild LVH.  Close followup w/ Dr Carr to re-assess patient satisfaction with symptom control on beta blockers.  No objection to discharge planning.    Kin Wall M.D.  Cardiac Electrophysiology  [phone]

## 2021-06-22 ENCOUNTER — NON-APPOINTMENT (OUTPATIENT)
Age: 81
End: 2021-06-22

## 2021-07-01 ENCOUNTER — APPOINTMENT (OUTPATIENT)
Dept: GERIATRICS | Facility: CLINIC | Age: 81
End: 2021-07-01
Payer: MEDICARE

## 2021-07-01 VITALS
HEIGHT: 59 IN | WEIGHT: 179 LBS | SYSTOLIC BLOOD PRESSURE: 110 MMHG | RESPIRATION RATE: 14 BRPM | BODY MASS INDEX: 36.08 KG/M2 | OXYGEN SATURATION: 98 % | HEART RATE: 78 BPM | DIASTOLIC BLOOD PRESSURE: 70 MMHG

## 2021-07-01 PROCEDURE — 99495 TRANSJ CARE MGMT MOD F2F 14D: CPT | Mod: GC

## 2021-07-01 RX ORDER — ROSUVASTATIN CALCIUM 10 MG/1
10 TABLET, FILM COATED ORAL
Qty: 90 | Refills: 1 | Status: DISCONTINUED | COMMUNITY
Start: 2020-11-16 | End: 2021-07-01

## 2021-07-01 RX ORDER — CHLORTHALIDONE 25 MG/1
25 TABLET ORAL DAILY
Qty: 90 | Refills: 3 | Status: DISCONTINUED | COMMUNITY
Start: 2019-09-13 | End: 2021-07-01

## 2021-07-01 NOTE — HISTORY OF PRESENT ILLNESS
[FreeTextEntry1] : 81 yo F pmhx of depression, chronic back pain 2/2 sciatica/ lumbar stenosis, GERD, CKD stg III, DM, HLD, HTN presents for follow up after hospital discharge (6/17-6/21) for new onset atrial fibrillation - patient wished for medical management. She was started on Eliquis 5BID, metoprolol 25mg BID, and lasix. Currently in atrial fibrillation however rate controlled. Saw cardiologist Dr. Hung yesterday who put her on a holter monitor. \par \par Reports having elevated sugars since she started prednisone for her back pain.  [No falls in past year] : Patient reported no falls in the past year [Fully functional (bathing, dressing, toileting, transferring, walking, feeding)] : Fully functional (bathing, dressing, toileting, transferring, walking, feeding) [Fully functional (using the telephone, shopping, preparing meals, housekeeping, doing laundry, using transportation,] : Fully functional and needs no help or supervision to perform IADLs (using the telephone, shopping, preparing meals, housekeeping, doing laundry, using transportation, managing medications and managing finances) [1] : 1) Little interest or pleasure doing things for several days [2] : 2) Feeling down, depressed, or hopeless for more than half of the days [PHQ-2 Score ___] : PHQ-2 Score [unfilled]

## 2021-07-01 NOTE — ASSESSMENT
[FreeTextEntry1] : 79 yo F pmhx of depression, chronic back pain 2/2 sciatica/ lumbar stenosis, GERD, CKD stg III, DM, HLD, HTN presents for follow up after hospital discharge (6/17-6/21) for new onset atrial fibrillation, being medically managed.

## 2021-07-01 NOTE — END OF VISIT
[] : Resident [FreeTextEntry3] : Mrs. Simmons was seen with Dr. Denton, R3. I obtained a detailed history, reviewed the hospital chart and examined Mrs. Simmons. I discussed the findings and plan with the patient and the resident, reviewed the resident's note and agree with assessment and plan. Briefly this is a 80-year-old functionally independent woman with a history of diabetes, hypertension, colon cancer and CKD. She presented with a one-week history of weakness, fatigue and dyspnea, found to be in A. fib and elected medical management. She was discharged home on June 21. She saw cardiology Dr. Ollie Hung yesterday and reportedly had a stress test, echo, carotid Dopplers and lower extremity studies. On exam today the patient is well appearing. Heart tones are normal, rate is irregularly irregular. Lungs are clear and there is no peripheral edema.\par The patient is wearing a Holter monitor. I advised her to continue on her discharge medications, refills sent to the pharmacy.\par The patient also appears depressed. She does not wish to take any antidepressant medication or seek professional counseling. Support offered to her and asked her to reach out to me anytime.

## 2021-07-01 NOTE — PHYSICAL EXAM
[General Appearance - Alert] : alert [General Appearance - In No Acute Distress] : in no acute distress [General Appearance - Well Nourished] : well nourished [Respiration, Rhythm And Depth] : normal respiratory rhythm and effort [Exaggerated Use Of Accessory Muscles For Inspiration] : no accessory muscle use [Auscultation Breath Sounds / Voice Sounds] : lungs were clear to auscultation bilaterally [Murmurs] : no murmurs [Bowel Sounds] : normal bowel sounds [Abdomen Soft] : soft [Abdomen Tenderness] : non-tender [Abnormal Walk] : normal gait [Oriented To Time, Place, And Person] : oriented to person, place, and time [Sclera] : the sclera and conjunctiva were normal [PERRL With Normal Accommodation] : pupils were equal in size, round, and reactive to light [Outer Ear] : the ears and nose were normal in appearance [Neck Appearance] : the appearance of the neck was normal [Neck Cervical Mass (___cm)] : no neck mass was observed [Jugular Venous Distention Increased] : there was no jugular-venous distention [Heart Sounds] : normal S1 and S2 [FreeTextEntry1] : irregularly irregular [Edema] : there was no peripheral edema [Cervical Lymph Nodes Enlarged Posterior Bilaterally] : posterior cervical [Cervical Lymph Nodes Enlarged Anterior Bilaterally] : anterior cervical [Supraclavicular Lymph Nodes Enlarged Bilaterally] : supraclavicular [Axillary Lymph Nodes Enlarged Bilaterally] : axillary [Nail Clubbing] : no clubbing  or cyanosis of the fingernails [Involuntary Movements] : no involuntary movements were seen [Motor Tone] : muscle strength and tone were normal [] : no rash [No Focal Deficits] : no focal deficits

## 2021-07-02 ENCOUNTER — APPOINTMENT (OUTPATIENT)
Dept: NEPHROLOGY | Facility: CLINIC | Age: 81
End: 2021-07-02
Payer: MEDICARE

## 2021-07-02 VITALS
HEART RATE: 76 BPM | HEIGHT: 59 IN | BODY MASS INDEX: 35.56 KG/M2 | OXYGEN SATURATION: 98 % | TEMPERATURE: 97.7 F | DIASTOLIC BLOOD PRESSURE: 63 MMHG | SYSTOLIC BLOOD PRESSURE: 121 MMHG | WEIGHT: 176.37 LBS

## 2021-07-02 DIAGNOSIS — E87.5 HYPERKALEMIA: ICD-10-CM

## 2021-07-02 PROCEDURE — 99212 OFFICE O/P EST SF 10 MIN: CPT

## 2021-07-02 RX ORDER — SODIUM BICARBONATE 650 MG/1
650 TABLET ORAL 3 TIMES DAILY
Qty: 360 | Refills: 4 | Status: DISCONTINUED | COMMUNITY
Start: 2021-04-02 | End: 2021-07-02

## 2021-07-02 RX ORDER — ATORVASTATIN CALCIUM 40 MG/1
40 TABLET, FILM COATED ORAL
Qty: 1 | Refills: 2 | Status: DISCONTINUED | COMMUNITY
Start: 2021-07-01 | End: 2021-07-02

## 2021-07-02 NOTE — REVIEW OF SYSTEMS
[Feeling Tired] : feeling tired [Diarrhea] : diarrhea [As Noted in HPI] : as noted in HPI [Negative] : Psychiatric

## 2021-07-02 NOTE — PHYSICAL EXAM
[General Appearance - Alert] : alert [Sclera] : the sclera and conjunctiva were normal [Hearing Threshold Finger Rub Not [place]] : hearing was normal [Jugular Venous Distention Increased] : there was no jugular-venous distention [Auscultation Breath Sounds / Voice Sounds] : lungs were clear to auscultation bilaterally [Irregularly Irregular] : the rhythm was irregularly irregular [Pitting Edema] : pitting edema present [___ +] : bilateral [unfilled]+ pitting edema to the ankles [Abdomen Tenderness] : non-tender [Urinary Bladder Findings] : the bladder was normal on palpation [No CVA Tenderness] : no ~M costovertebral angle tenderness [Abnormal Walk] : normal gait [] : no rash [No Focal Deficits] : no focal deficits [Oriented To Time, Place, And Person] : oriented to person, place, and time

## 2021-07-07 LAB
ALBUMIN SERPL ELPH-MCNC: 4 G/DL
ALP BLD-CCNC: 80 U/L
ALT SERPL-CCNC: 12 U/L
ANION GAP SERPL CALC-SCNC: 12 MMOL/L
AST SERPL-CCNC: 11 U/L
BASOPHILS # BLD AUTO: 0.06 K/UL
BASOPHILS NFR BLD AUTO: 1.2 %
BILIRUB SERPL-MCNC: 0.3 MG/DL
BUN SERPL-MCNC: 31 MG/DL
CALCIUM SERPL-MCNC: 9.5 MG/DL
CHLORIDE SERPL-SCNC: 105 MMOL/L
CHOLEST SERPL-MCNC: 126 MG/DL
CO2 SERPL-SCNC: 22 MMOL/L
CREAT SERPL-MCNC: 1.37 MG/DL
EOSINOPHIL # BLD AUTO: 0.13 K/UL
EOSINOPHIL NFR BLD AUTO: 2.6 %
ESTIMATED AVERAGE GLUCOSE: 183 MG/DL
GLUCOSE SERPL-MCNC: 133 MG/DL
HBA1C MFR BLD HPLC: 8 %
HCT VFR BLD CALC: 29.1 %
HDLC SERPL-MCNC: 46 MG/DL
HGB BLD-MCNC: 9.2 G/DL
IMM GRANULOCYTES NFR BLD AUTO: 0.2 %
LDLC SERPL CALC-MCNC: 54 MG/DL
LYMPHOCYTES # BLD AUTO: 1.06 K/UL
LYMPHOCYTES NFR BLD AUTO: 21.3 %
MAN DIFF?: NORMAL
MCHC RBC-ENTMCNC: 29.8 PG
MCHC RBC-ENTMCNC: 31.6 GM/DL
MCV RBC AUTO: 94.2 FL
MONOCYTES # BLD AUTO: 0.38 K/UL
MONOCYTES NFR BLD AUTO: 7.6 %
NEUTROPHILS # BLD AUTO: 3.34 K/UL
NEUTROPHILS NFR BLD AUTO: 67.1 %
NONHDLC SERPL-MCNC: 80 MG/DL
PLATELET # BLD AUTO: 312 K/UL
POTASSIUM SERPL-SCNC: 4.9 MMOL/L
PROT SERPL-MCNC: 6.1 G/DL
RBC # BLD: 3.09 M/UL
RBC # FLD: 12.4 %
SODIUM SERPL-SCNC: 139 MMOL/L
TRIGL SERPL-MCNC: 134 MG/DL
WBC # FLD AUTO: 4.98 K/UL

## 2021-07-15 NOTE — REASON FOR VISIT
[Post Hospitalization] : a post hospitalization visit [FreeTextEntry1] : First visit after recent hospitalization for atrial fibrillation.

## 2021-07-15 NOTE — HISTORY OF PRESENT ILLNESS
[FreeTextEntry1] : Mrs Simmons developed atrial fibrillation w/ fast ventricular response. She was symptomatic and was admitted to [facility].  She was rate controlled w/ beta blocker and was started on Eliquis. She has followed up with Dr. Pedro Luis Hung, had an ECHO and an EKG done, the latter showing atrial fibrillation with controlled rate.\par The patient has completed a recent Prednisone taper for her lower back pain. It has not helped significantly but it has improve her knee arthritis symptoms. Her shoulder is better after local injection.\par Overall the patient is stable.  Still has episodes of palpitations but they do not last for more than a few minutes. She has an implanter loop recorder and she will follow up w/ her cardiologist.

## 2021-07-15 NOTE — ASSESSMENT
[FreeTextEntry1] : During this visit I have reconciled the patient's medication.  She was taking both Atorvastatin and Rosuvastatin. I stopped the first one. I also discontinued the 4 tablets of Na Bicarbonate because of the edema. \par 1. CKD stage 3a/A3 w/ eGFR of 46 and improved proteinuria (Farxiga and ACE inhibitor). \par 2. Hyperkalemia: will check labs, but patient's K has been normal on Farxiga and Furosemide.\par 3. Worsening edema: will start by discontinuing the Na Bicarbonate.\par 4. Type II DM: check A1c.\par The patient will have labs done today and will call with results when available. Follow up w/ Dr. Hung.\par Return for follow up in 3 months.

## 2021-07-16 ENCOUNTER — APPOINTMENT (OUTPATIENT)
Dept: NEPHROLOGY | Facility: CLINIC | Age: 81
End: 2021-07-16

## 2021-07-21 ENCOUNTER — NON-APPOINTMENT (OUTPATIENT)
Age: 81
End: 2021-07-21

## 2021-08-10 ENCOUNTER — APPOINTMENT (OUTPATIENT)
Dept: ORTHOPEDIC SURGERY | Facility: CLINIC | Age: 81
End: 2021-08-10
Payer: MEDICARE

## 2021-08-10 VITALS
WEIGHT: 175 LBS | BODY MASS INDEX: 34.36 KG/M2 | HEIGHT: 60 IN | SYSTOLIC BLOOD PRESSURE: 123 MMHG | DIASTOLIC BLOOD PRESSURE: 70 MMHG | HEART RATE: 75 BPM

## 2021-08-10 DIAGNOSIS — M54.16 RADICULOPATHY, LUMBAR REGION: ICD-10-CM

## 2021-08-10 PROCEDURE — 99213 OFFICE O/P EST LOW 20 MIN: CPT

## 2021-08-10 NOTE — HISTORY OF PRESENT ILLNESS
[de-identified] : She had significant but only temporary relief of her leg pain with the prednisone taper in June.  Her blood sugars became elevated which is not a surprise.  At some point after the steroid she developed atrial fibrillation and was hospitalized for a short period of time.  She is currently on Eliquis.  Her back and leg symptoms have returned.

## 2021-08-24 ENCOUNTER — APPOINTMENT (OUTPATIENT)
Dept: ORTHOPEDIC SURGERY | Facility: CLINIC | Age: 81
End: 2021-08-24
Payer: MEDICARE

## 2021-08-24 DIAGNOSIS — M25.511 PAIN IN RIGHT SHOULDER: ICD-10-CM

## 2021-08-24 PROCEDURE — 99213 OFFICE O/P EST LOW 20 MIN: CPT

## 2021-08-24 NOTE — CONSULT LETTER
[Dear  ___] : Dear  [unfilled], [Please see my note below.] : Please see my note below. [FreeTextEntry1] : I had the pleasure of evaluating your patient in the office today for complaints of right shoulder pain secondary to partial biceps tear, rotator cuff tendinopathy, and synovitis. I have enclosed a copy of today's office notes for your charts and for your review.\par \par Sincerely, \par \par Evangelista Velasquez M.D.\par Professor and \par Department of Orthopedic Surgery\par [facility] [place]\par

## 2021-08-24 NOTE — DISCUSSION/SUMMARY
[Medication Risks Reviewed] : Medication risks reviewed [de-identified] : 80 year old female with right shoulder pain secondary to degenerative changes in the shoulder, partial biceps tear, rotator cuff tendinopathy, synovitis. A lengthy discussion was held regarding the patient’s condition and treatment options including all risks, benefits, prognosis and outcomes of each were discussed in detail. As the patient had a recent complication secondary to medications (Afib due to systemic steroids), I reviewed side effects of medications at great length. Overall, the patient would like to continue with conservative management at this time and was advised on the use of Tylenol for pain control as she cannot take NSAIDs.  icing, activity modification, and possible cortisone injections in the future. We discussed the possibility of a cortisone injection today, but the patient would like to hold off at this time. She is a candidate for an injection in the future. The patient will contact me if there are any concerns otherwise follow up will be in 3 months. The patient express understanding and all questions were answered.\par \par \par

## 2021-08-24 NOTE — PHYSICAL EXAM
[LE] : Sensory: Intact in bilateral lower extremities [DP] : dorsalis pedis 2+ and symmetric bilaterally [PT] : posterior tibial 2+ and symmetric bilaterally [de-identified] : Pt is pleasant 81 yo female, alert and oriented x3 with no apparent distress, increased BMI.  Physical examination of the right shoulder reveals normal contours with no deformity, skin intact, with no signs of infection, no erythema, no swelling, no discoloration, no distal lymphedema, no patholaxity, no muscle atrophy noted. ROM of right shoulder reveals forward flexion to 125°,  external rotation 25°,  IR to L3. Pain with internal rotation and adduction. Motor strength is 4/5 throughout. No neurological deficits.\par \par \par  [de-identified] : No additional imaging at this visit.\par

## 2021-08-24 NOTE — HISTORY OF PRESENT ILLNESS
[de-identified] : 79 y/o RHD female who is retired  presents with right shoulder pain due to an injury on 7/2019 at a [employer]. She was seen and was found to have mild degenerative changes and partial cuff tear on MRI.  She was treated with an injection and PT which did help. But her symptoms have returned. The pain is keeping her up at night. The pain is worse at NIGHT. She takes Extra Strength Tylenol.  \par \par Hx: DM medication controlled. HTN, Hyperlipidemia. \par Developed Afib on Eliquis due to Steroid tabs.

## 2021-09-28 ENCOUNTER — MED ADMIN CHARGE (OUTPATIENT)
Age: 81
End: 2021-09-28

## 2021-10-01 ENCOUNTER — APPOINTMENT (OUTPATIENT)
Dept: NEPHROLOGY | Facility: CLINIC | Age: 81
End: 2021-10-01
Payer: MEDICARE

## 2021-10-01 VITALS
OXYGEN SATURATION: 98 % | SYSTOLIC BLOOD PRESSURE: 126 MMHG | HEART RATE: 82 BPM | RESPIRATION RATE: 15 BRPM | BODY MASS INDEX: 34.55 KG/M2 | HEIGHT: 60 IN | DIASTOLIC BLOOD PRESSURE: 76 MMHG | WEIGHT: 176 LBS

## 2021-10-01 DIAGNOSIS — M54.41 LUMBAGO WITH SCIATICA, RIGHT SIDE: ICD-10-CM

## 2021-10-01 DIAGNOSIS — G89.29 LUMBAGO WITH SCIATICA, RIGHT SIDE: ICD-10-CM

## 2021-10-01 PROCEDURE — 99213 OFFICE O/P EST LOW 20 MIN: CPT

## 2021-10-01 NOTE — HISTORY OF PRESENT ILLNESS
[FreeTextEntry1] : The patient presents today with the main complaint of low back pain radiating down to the right leg.  She states that the pain is always there. It starts as soon as she wakes up in the morning.  She is taking Tylenol 1000 mg every 6 hours but this mediation is minimally effective as the pain may be partly relieved only for about on hour. She sates that in the past she has taken Tylenol w/ Hydrocodone with good relief. She states that she has taken this medication for a long time and she never became addicted to it. She was given a prescription about 8 months ago by Dr. Bauer. Mrs. Simmons continues to take her medications as prescribed but states that he co-payment for Eliquis and Farxiga is high.  She also has decided not to follow with the cardiologist she saw after her recent hospitalization for atrial fibrillation as she has had problem w/ the office.\par Her main goal today is to address the low back pain.

## 2021-10-01 NOTE — ASSESSMENT
[FreeTextEntry1] : We discussed the patient main issue:\par 1 Low back pain with radiculopathy.  The patient cannot take NSAIDs as she is on Eliquis and has CKD.  She will see a chiropractor, continue w/ prn Tylenol and use Tylenol w/ hydrocodone sparingly to control severe pain.  She was given 60 tablet of this medication w/o renewals. She will come back to the office next Wed for labs and at that time will report to me about the chiropractor visit and the pain control.  She has seen ortho but they said they could not help her at this point. We discussed at length that narcotics are not the answer to this type of problems. She understands. She is willing to see a chiropractor and when the pain is better to do PT. \par 2. Type II DM. Will check A1c on Wednesday.\par 3. Proteinuria and CKD III. Labs on Wednesday. \par 4 Edema better. Continue present regimen.\par The patient was given a pharmacy discount card for Eliquis and Farxiga.\par Follow up in 3 months.

## 2021-10-01 NOTE — PHYSICAL EXAM
[General Appearance - Alert] : alert [Sclera] : the sclera and conjunctiva were normal [Hearing Threshold Finger Rub Not [place]] : hearing was normal [Jugular Venous Distention Increased] : there was no jugular-venous distention [Auscultation Breath Sounds / Voice Sounds] : lungs were clear to auscultation bilaterally [Irregularly Irregular] : the rhythm was irregularly irregular [Systolic grade ___/6] : A grade [unfilled]/6 systolic murmur was heard. [Pitting Edema] : pitting edema present [___ +] : bilateral [unfilled]+ pitting edema to the ankles [Abdomen Tenderness] : non-tender [Urinary Bladder Findings] : the bladder was normal on palpation [No CVA Tenderness] : no ~M costovertebral angle tenderness [Abnormal Walk] : normal gait [] : no rash [No Focal Deficits] : no focal deficits [Oriented To Time, Place, And Person] : oriented to person, place, and time

## 2021-10-01 NOTE — REASON FOR VISIT
[Follow-Up] : a follow-up visit [FreeTextEntry1] : 3 month follow up for hypertension, atrial fibrillation and proteinuria in the setting of type II DM

## 2021-10-01 NOTE — REVIEW OF SYSTEMS
[Feeling Tired] : feeling tired [Lower Ext Edema] : lower extremity edema [Cough] : cough [Constipation] : constipation [Anxiety] : anxiety [Depression] : depression [As Noted in HPI] : as noted in HPI [Negative] : Neurological

## 2021-10-06 ENCOUNTER — APPOINTMENT (OUTPATIENT)
Dept: CARDIOLOGY | Facility: CLINIC | Age: 81
End: 2021-10-06
Payer: MEDICARE

## 2021-10-06 ENCOUNTER — NON-APPOINTMENT (OUTPATIENT)
Age: 81
End: 2021-10-06

## 2021-10-06 VITALS
WEIGHT: 182 LBS | RESPIRATION RATE: 16 BRPM | TEMPERATURE: 98 F | HEART RATE: 69 BPM | BODY MASS INDEX: 36.69 KG/M2 | HEIGHT: 59 IN | DIASTOLIC BLOOD PRESSURE: 65 MMHG | OXYGEN SATURATION: 100 % | SYSTOLIC BLOOD PRESSURE: 143 MMHG

## 2021-10-06 DIAGNOSIS — M75.51 BURSITIS OF RIGHT SHOULDER: ICD-10-CM

## 2021-10-06 DIAGNOSIS — I10 ESSENTIAL (PRIMARY) HYPERTENSION: ICD-10-CM

## 2021-10-06 DIAGNOSIS — R07.89 OTHER CHEST PAIN: ICD-10-CM

## 2021-10-06 PROCEDURE — 93000 ELECTROCARDIOGRAM COMPLETE: CPT

## 2021-10-06 PROCEDURE — 99204 OFFICE O/P NEW MOD 45 MIN: CPT

## 2021-10-06 RX ORDER — OXYCODONE 5 MG/1
5 TABLET ORAL EVERY 6 HOURS
Qty: 40 | Refills: 0 | Status: DISCONTINUED | COMMUNITY
Start: 2021-06-04 | End: 2021-10-06

## 2021-10-06 RX ORDER — HYDROCODONE BITARTRATE AND ACETAMINOPHEN 7.5; 3 MG/1; MG/1
7.5-3 TABLET ORAL
Qty: 60 | Refills: 0 | Status: DISCONTINUED | COMMUNITY
Start: 2019-11-20 | End: 2021-10-06

## 2021-10-06 RX ORDER — PREDNISONE 20 MG/1
20 TABLET ORAL
Qty: 26 | Refills: 0 | Status: DISCONTINUED | COMMUNITY
Start: 2021-06-03 | End: 2021-10-06

## 2021-10-07 ENCOUNTER — RX RENEWAL (OUTPATIENT)
Age: 81
End: 2021-10-07

## 2021-10-07 LAB
ALBUMIN SERPL ELPH-MCNC: 4.1 G/DL
ALP BLD-CCNC: 71 U/L
ALT SERPL-CCNC: 6 U/L
ANION GAP SERPL CALC-SCNC: 13 MMOL/L
APPEARANCE: CLEAR
AST SERPL-CCNC: 12 U/L
BACTERIA: NEGATIVE
BASOPHILS # BLD AUTO: 0.09 K/UL
BASOPHILS NFR BLD AUTO: 1.5 %
BILIRUB SERPL-MCNC: 0.2 MG/DL
BILIRUBIN URINE: NEGATIVE
BLOOD URINE: NEGATIVE
BUN SERPL-MCNC: 41 MG/DL
CALCIUM SERPL-MCNC: 9.5 MG/DL
CHLORIDE SERPL-SCNC: 108 MMOL/L
CO2 SERPL-SCNC: 20 MMOL/L
COLOR: COLORLESS
CREAT SERPL-MCNC: 1.39 MG/DL
CREAT SPEC-SCNC: 17 MG/DL
CREAT/PROT UR: 0.6 RATIO
EOSINOPHIL # BLD AUTO: 0.1 K/UL
EOSINOPHIL NFR BLD AUTO: 1.6 %
ESTIMATED AVERAGE GLUCOSE: 134 MG/DL
GLUCOSE QUALITATIVE U: NEGATIVE
GLUCOSE SERPL-MCNC: 103 MG/DL
HBA1C MFR BLD HPLC: 6.3 %
HCT VFR BLD CALC: 28.5 %
HGB BLD-MCNC: 9.1 G/DL
HYALINE CASTS: 0 /LPF
IMM GRANULOCYTES NFR BLD AUTO: 0.2 %
KETONES URINE: NEGATIVE
LEUKOCYTE ESTERASE URINE: NEGATIVE
LYMPHOCYTES # BLD AUTO: 1.1 K/UL
LYMPHOCYTES NFR BLD AUTO: 18.1 %
MAN DIFF?: NORMAL
MCHC RBC-ENTMCNC: 30.4 PG
MCHC RBC-ENTMCNC: 31.9 GM/DL
MCV RBC AUTO: 95.3 FL
MICROSCOPIC-UA: NORMAL
MONOCYTES # BLD AUTO: 0.64 K/UL
MONOCYTES NFR BLD AUTO: 10.5 %
NEUTROPHILS # BLD AUTO: 4.14 K/UL
NEUTROPHILS NFR BLD AUTO: 68.1 %
NITRITE URINE: NEGATIVE
PH URINE: 6
PLATELET # BLD AUTO: 221 K/UL
POTASSIUM SERPL-SCNC: 6.1 MMOL/L
PROT SERPL-MCNC: 6.3 G/DL
PROT UR-MCNC: 10 MG/DL
PROTEIN URINE: NEGATIVE
RBC # BLD: 2.99 M/UL
RBC # FLD: 12.9 %
RED BLOOD CELLS URINE: 0 /HPF
SODIUM SERPL-SCNC: 141 MMOL/L
SPECIFIC GRAVITY URINE: 1.01
SQUAMOUS EPITHELIAL CELLS: 1 /HPF
TSH SERPL-ACNC: 3.24 UIU/ML
UROBILINOGEN URINE: NORMAL
WBC # FLD AUTO: 6.08 K/UL
WHITE BLOOD CELLS URINE: 0 /HPF

## 2021-10-07 RX ORDER — FUROSEMIDE 20 MG/1
20 TABLET ORAL DAILY
Qty: 90 | Refills: 3 | Status: DISCONTINUED | COMMUNITY
Start: 2021-07-01 | End: 2021-10-07

## 2021-10-07 RX ORDER — DAPAGLIFLOZIN 5 MG/1
5 TABLET, FILM COATED ORAL DAILY
Qty: 30 | Refills: 6 | Status: DISCONTINUED | COMMUNITY
Start: 2020-12-02 | End: 2021-10-07

## 2021-10-11 PROBLEM — R07.89 CHEST DISCOMFORT: Status: RESOLVED | Noted: 2019-08-29 | Resolved: 2021-10-11

## 2021-10-11 PROBLEM — M75.51 ACUTE BURSITIS OF RIGHT SHOULDER: Status: RESOLVED | Noted: 2021-04-20 | Resolved: 2021-10-11

## 2021-10-11 NOTE — HISTORY OF PRESENT ILLNESS
[FreeTextEntry1] : Patient with atrial fibrillation, DM, HTN, anemia, spinal stenosis, lumbar radiculopathy, and CKD. Currently doing okay. Denies chest pain, shortness of breath or palpitations. Was admitted to [facility] back in June with SOB and presumed new onset atrial fibrillation.

## 2021-10-11 NOTE — CARDIOLOGY SUMMARY
[de-identified] : \par 10/06/21 - sinus rhythm, short SC, nonspecific ST abnormality\par  [de-identified] : \par 08/30/19 (exercise MIBI) - 5 METS< 90% MPHR, 03:38 min, no ECG abnormalities, Duke score 3, no evidence for myocardial infarction or ischemia, LVEF 68%\par  [de-identified] : \par 06/20/21 - MAC, mild MR, mild MS (mean MV gradient 3 mmHg), normal LA, grossly normal LV systolic function, concentric LV remodeling, normal RV size and function, RVSP 29 mmHg, LVEF 60%\par

## 2021-10-11 NOTE — DISCUSSION/SUMMARY
[Paroxysmal Atrial Fibrillation] : paroxysmal atrial fibrillation [Compensated] : compensated [Hyperlipidemia] : hyperlipidemia [Stable] : stable [Hypertension] : hypertension [FreeTextEntry1] : \par Currently stable from a cardiovascular standpoint. Hypertensive today. Appears euvolemic. History of paroxysmal atrial fibrillation ([id number] score 4). Currently in sinus rhythm. Most recent labs reviewed. Patient with CKD stage III (creatinine 1.39, eGFR 36). Lipids optimized (chol 126, LDL 54, HDL 46, trig 134). Continue current medications including apixaban and metoprolol. ECG completed today and reviewed (findings as noted above). Prior cardiac records reviewed. Follow up in 3 months.

## 2021-10-28 ENCOUNTER — APPOINTMENT (OUTPATIENT)
Dept: GERIATRICS | Facility: CLINIC | Age: 81
End: 2021-10-28
Payer: MEDICARE

## 2021-10-28 VITALS
BODY MASS INDEX: 36.32 KG/M2 | TEMPERATURE: 97.8 F | DIASTOLIC BLOOD PRESSURE: 72 MMHG | HEIGHT: 59 IN | WEIGHT: 180.13 LBS | OXYGEN SATURATION: 93 % | RESPIRATION RATE: 16 BRPM | SYSTOLIC BLOOD PRESSURE: 138 MMHG | HEART RATE: 90 BPM

## 2021-10-28 DIAGNOSIS — K21.9 GASTRO-ESOPHAGEAL REFLUX DISEASE W/OUT ESOPHAGITIS: ICD-10-CM

## 2021-10-28 PROCEDURE — 99214 OFFICE O/P EST MOD 30 MIN: CPT | Mod: GC

## 2021-10-28 RX ORDER — BLOOD SUGAR DIAGNOSTIC
STRIP MISCELLANEOUS
Qty: 1 | Refills: 3 | Status: ACTIVE | COMMUNITY
Start: 2020-01-21 | End: 1900-01-01

## 2021-10-28 RX ORDER — IRON POLYSACCHARIDE COMPLEX 150 MG
150 CAPSULE ORAL
Qty: 60 | Refills: 3 | Status: DISCONTINUED | COMMUNITY
Start: 2021-04-05 | End: 2021-10-28

## 2021-10-28 RX ORDER — ALPRAZOLAM 0.5 MG/1
0.5 TABLET ORAL
Qty: 4 | Refills: 0 | Status: DISCONTINUED | COMMUNITY
Start: 2020-12-01 | End: 2021-10-28

## 2021-10-28 NOTE — END OF VISIT
[] : Fellow [FreeTextEntry3] : Mrs. Simmons was seen with Dr. Elias, geriatric fellow. I obtained a detailed interval history and personally examined the patient. I discussed my findings and plan with the patient and the fellow, reviewed the fellow note and agree with assessment and plan. The patient is doing reasonably well. She complains of right shoulder and low back pain. Her shoulder pain is worse at night and oftentimes keeps her from sleeping. She has followup scheduled with orthopedics for possible injection. We'll update labs. Meds reconciled. Flu shot was administered.

## 2021-10-28 NOTE — PHYSICAL EXAM
[Alert] : alert [No Acute Distress] : in no acute distress [Sclera] : the sclera and conjunctiva were normal [Normal Outer Ear/Nose] : the ears and nose were normal in appearance [Normal Appearance] : the appearance of the neck was normal [No Respiratory Distress] : no respiratory distress [No Acc Muscle Use] : no accessory muscle use [Respiration, Rhythm And Depth] : normal respiratory rhythm and effort [Auscultation Breath Sounds / Voice Sounds] : lungs were clear to auscultation bilaterally [Normal S1, S2] : normal S1 and S2 [Heart Rate And Rhythm] : heart rate was normal and rhythm regular [Edema] : edema was not present [Pedal Pulses Normal] : the pedal pulses are present [Bowel Sounds] : normal bowel sounds [Abdomen Tenderness] : non-tender [Abdomen Soft] : soft [No Spinal Tenderness] : no spinal tenderness [No Clubbing, Cyanosis] : no clubbing or cyanosis of the fingernails [Motor Tone] : muscle strength and tone were normal [Normal Color / Pigmentation] : normal skin color and pigmentation [No Focal Deficits] : no focal deficits [Normal Affect] : the affect was normal [Normal Mood] : the mood was normal

## 2021-10-29 LAB
ANION GAP SERPL CALC-SCNC: 14 MMOL/L
BUN SERPL-MCNC: 39 MG/DL
CALCIUM SERPL-MCNC: 9.6 MG/DL
CHLORIDE SERPL-SCNC: 106 MMOL/L
CO2 SERPL-SCNC: 20 MMOL/L
CREAT SERPL-MCNC: 1.43 MG/DL
GLUCOSE SERPL-MCNC: 112 MG/DL
POTASSIUM SERPL-SCNC: 4.8 MMOL/L
SODIUM SERPL-SCNC: 140 MMOL/L

## 2021-12-03 ENCOUNTER — APPOINTMENT (OUTPATIENT)
Dept: ORTHOPEDIC SURGERY | Facility: CLINIC | Age: 81
End: 2021-12-03
Payer: MEDICARE

## 2021-12-03 PROCEDURE — 20610 DRAIN/INJ JOINT/BURSA W/O US: CPT | Mod: RT

## 2021-12-03 PROCEDURE — 99213 OFFICE O/P EST LOW 20 MIN: CPT | Mod: 25

## 2021-12-03 NOTE — HISTORY OF PRESENT ILLNESS
[de-identified] : 80 y/o RHD female who is retired  presents with right shoulder pain due to an injury on 7/2019 at a [email or web address]. She was seen and was found to have mild degenerative changes and partial cuff tear on MRI.  She was treated with an injection and PT which did help. The pain is keeping her up at night. The pain is worse at NIGHT. She takes Extra Strength Tylenol. She might want to try another injection today, which she still does not want to have surgery and she was advised not to have surgery by her medical doctors. \par \par Hx: DM medication controlled. HTN, Hyperlipidemia. \par Developed Afib on Eliquis due to Steroid tabs.

## 2021-12-03 NOTE — PROCEDURE
[de-identified] : After careful discussion regarding the risks versus benefits of a corticosteroid injection the patient has elected to proceed with that treatment. Under sterile conditions, the patient received  a right shoulder injection into the glenohumeral joint and subacromial space with 8 cc of half percent Marcaine without epinephrine and 2 cc of Betamethasone. The site was cleaned and a bandaid was applied. The patient tolerated the injection without problem.\par \par

## 2021-12-03 NOTE — CONSULT LETTER
[Dear  ___] : Dear  [unfilled], [Please see my note below.] : Please see my note below. [FreeTextEntry1] : I had the pleasure of evaluating your patient in the office today for complaints of right shoulder pain secondary to partial biceps tear, rotator cuff tendinopathy, and synovitis. I have enclosed a copy of today's office notes for your charts and for your review.\par \par Sincerely, \par \par Evangelista Velasquez M.D.\par Professor and \par Department of Orthopedic Surgery\par [facility] [place]\par

## 2021-12-03 NOTE — PHYSICAL EXAM
[LE] : Sensory: Intact in bilateral lower extremities [DP] : dorsalis pedis 2+ and symmetric bilaterally [PT] : posterior tibial 2+ and symmetric bilaterally [de-identified] : \par  [de-identified] : Pt is pleasant 79 yo female, alert and oriented x3 with no apparent distress, increased BMI.  Physical examination of the right shoulder reveals normal contours with no deformity, skin intact, with no signs of infection, no erythema, no swelling, no discoloration, no distal lymphedema, no patholaxity, no muscle atrophy noted. No neurological deficits.\par \par \par

## 2021-12-06 ENCOUNTER — APPOINTMENT (OUTPATIENT)
Dept: NEPHROLOGY | Facility: CLINIC | Age: 81
End: 2021-12-06
Payer: MEDICARE

## 2021-12-06 VITALS
WEIGHT: 166 LBS | OXYGEN SATURATION: 96 % | TEMPERATURE: 98.4 F | DIASTOLIC BLOOD PRESSURE: 73 MMHG | BODY MASS INDEX: 33.47 KG/M2 | SYSTOLIC BLOOD PRESSURE: 154 MMHG | HEIGHT: 59 IN | HEART RATE: 75 BPM

## 2021-12-06 DIAGNOSIS — J02.9 ACUTE PHARYNGITIS, UNSPECIFIED: ICD-10-CM

## 2021-12-06 PROCEDURE — 99213 OFFICE O/P EST LOW 20 MIN: CPT

## 2021-12-08 VITALS — DIASTOLIC BLOOD PRESSURE: 68 MMHG | SYSTOLIC BLOOD PRESSURE: 132 MMHG | HEART RATE: 74 BPM

## 2021-12-08 NOTE — HISTORY OF PRESENT ILLNESS
[FreeTextEntry1] : Mrs. Simmons has received an injection in her right shoulder w/ some relief. He major issue is the back pain that is limiting significantly her activities of daily living.  She is not contemplating surgery. At the last visit with me on October 1 she was given a 10 day prescription of  Hydrocodone/Tylenol. She has used it sparingly and has 2 pills left. She denies palpitations and has a follow up with her cardiologist for atrial fibrillation in early January. \par Labs were done recently and the results will be discussed on this visit.

## 2021-12-08 NOTE — REASON FOR VISIT
[Follow-Up] : a follow-up visit [FreeTextEntry1] : 3 month follow up for hypertension,CKD,  atrial fibrillation and proteinuria in the setting of type II DM

## 2021-12-08 NOTE — ASSESSMENT
[FreeTextEntry1] : We discussed the patient main issue:\par 1 Low back pain with radiculopathy.  The patient has found no relief w/ PT. She is using Hydrocodone/Acetaminophen sparingly mostly at nigh to sleep.  We explore the option of using a Chiropractor and the patient was given a referral.\par 2. Type II DM.  A1c in October was 3\par 3. Proteinuria and CKD III. Labs on Wednesday. \par 4 Edema better. Continue present regimen.\par 5. Hyperkalemia: repeat K in October was normal. Continue to avoid high K food and using Furosmide twice per day. \par 6. CKD stage 3b/A2. Protein to creatinine ratio down to 0,6. Same regimen.\par Follow up with cardiologist in January.\par Follow up with me in 3 months.

## 2021-12-08 NOTE — PHYSICAL EXAM
[General Appearance - Alert] : alert [Sclera] : the sclera and conjunctiva were normal [Hearing Threshold Finger Rub Not [place]] : hearing was normal [Jugular Venous Distention Increased] : there was no jugular-venous distention [Auscultation Breath Sounds / Voice Sounds] : lungs were clear to auscultation bilaterally [Systolic grade ___/6] : A grade [unfilled]/6 systolic murmur was heard. [Pitting Edema] : pitting edema present [___ +] : bilateral [unfilled]+ pitting edema to the ankles [Abdomen Tenderness] : non-tender [Urinary Bladder Findings] : the bladder was normal on palpation [No CVA Tenderness] : no ~M costovertebral angle tenderness [Abnormal Walk] : normal gait [] : no rash [No Focal Deficits] : no focal deficits [Oriented To Time, Place, And Person] : oriented to person, place, and time [Heart Rate And Rhythm] : heart rate was normal and rhythm regular [Normal] : the heart rate was normal [Regular] : the rhythm was regular

## 2021-12-30 PROBLEM — J02.9 SORE THROAT: Status: RESOLVED | Noted: 2021-12-30 | Resolved: 2022-01-29

## 2022-01-19 RX ORDER — AMOXICILLIN 250 MG/1
250 CAPSULE ORAL 3 TIMES DAILY
Qty: 30 | Refills: 0 | Status: DISCONTINUED | COMMUNITY
Start: 2021-12-30 | End: 2022-01-19

## 2022-01-27 ENCOUNTER — APPOINTMENT (OUTPATIENT)
Dept: GERIATRICS | Facility: CLINIC | Age: 82
End: 2022-01-27
Payer: MEDICARE

## 2022-01-27 VITALS
SYSTOLIC BLOOD PRESSURE: 140 MMHG | DIASTOLIC BLOOD PRESSURE: 62 MMHG | HEART RATE: 73 BPM | BODY MASS INDEX: 36.56 KG/M2 | WEIGHT: 181 LBS | TEMPERATURE: 97.2 F | OXYGEN SATURATION: 99 % | RESPIRATION RATE: 16 BRPM

## 2022-01-27 PROCEDURE — 99214 OFFICE O/P EST MOD 30 MIN: CPT | Mod: GC

## 2022-01-27 NOTE — PHYSICAL EXAM
[Alert] : alert [No Acute Distress] : in no acute distress [Normal Outer Ear/Nose] : the ears and nose were normal in appearance [Normal Appearance] : the appearance of the neck was normal [Supple] : the neck was supple [No Respiratory Distress] : no respiratory distress [No Acc Muscle Use] : no accessory muscle use [Respiration, Rhythm And Depth] : normal respiratory rhythm and effort [Auscultation Breath Sounds / Voice Sounds] : lungs were clear to auscultation bilaterally [Normal PMI] : the apical impulse was abnormal [Normal S1, S2] : normal S1 and S2 [Murmurs] : no murmurs [Heart Rate And Rhythm] : heart rate was normal and rhythm regular [No Varicosities] : there were no varicosital changes [Bowel Sounds] : normal bowel sounds [Abdomen Tenderness] : non-tender [Abdomen Soft] : soft [No Spinal Tenderness] : no spinal tenderness [Normal Gait] : normal gait [No Clubbing, Cyanosis] : no clubbing or cyanosis of the fingernails [No Joint Swelling] : no joint swelling seen [Motor Tone] : muscle strength and tone were normal [Normal Color / Pigmentation] : normal skin color and pigmentation [Normal Turgor] : normal skin turgor [No Focal Deficits] : no focal deficits [Normal Affect] : the affect was normal [Normal Mood] : the mood was normal [de-identified] : trace pedal edema.

## 2022-01-27 NOTE — HISTORY OF PRESENT ILLNESS
[No falls in past year] : Patient reported no falls in the past year [Completely Independent] : Completely independent. [0] : 2) Feeling down, depressed, or hopeless: Not at all (0) [PHQ-2 Negative - No further assessment needed] : PHQ-2 Negative - No further assessment needed [FreeTextEntry1] : Miss Simmons is a 80 y/o Female PMH of HTN, HLD, CKD S3, DM2 and Atrial fibrillation on Eliquis. Patient is here in the clinic for a follow up visit. \par She has been seen by orthopedics and received an intrarticular steroid injection in the Rt shoulder since the last visit. She reports having good response to the injection. She now has complaints of the chronic back pain. She was also seen by Renal with some improvement in labs. Hyperkalemia has improved and her last A1c is 6.3.  She reports compliance with medication. \par Of note, she received her booster on 12/28 but tested positive for covid later that week but had a mild course with resolution of symptoms. \par She is emotionally stable and reports that things are better at home. \par \par She is compliant with medications and reports that she will consider an epidural. \par Provided list of spine surgeons for a referral. She is currently on Eliquis for paroxysmal Afib. \par She will discuss with cardiology regarding holding Eliquis for the epidural.\par Will obtain labs including CBC, CMP and A1c. \par \par \par  [[id number]] : 0

## 2022-01-28 LAB
ALBUMIN SERPL ELPH-MCNC: 4.3 G/DL
ALP BLD-CCNC: 67 U/L
ALT SERPL-CCNC: 5 U/L
ANION GAP SERPL CALC-SCNC: 10 MMOL/L
AST SERPL-CCNC: 18 U/L
BASOPHILS # BLD AUTO: 0.08 K/UL
BASOPHILS NFR BLD AUTO: 1.1 %
BILIRUB SERPL-MCNC: 0.2 MG/DL
BUN SERPL-MCNC: 32 MG/DL
CALCIUM SERPL-MCNC: 9.5 MG/DL
CHLORIDE SERPL-SCNC: 109 MMOL/L
CO2 SERPL-SCNC: 20 MMOL/L
CREAT SERPL-MCNC: 1.31 MG/DL
EOSINOPHIL # BLD AUTO: 0.11 K/UL
EOSINOPHIL NFR BLD AUTO: 1.5 %
ESTIMATED AVERAGE GLUCOSE: 134 MG/DL
GLUCOSE SERPL-MCNC: 107 MG/DL
HBA1C MFR BLD HPLC: 6.3 %
HCT VFR BLD CALC: 28.9 %
HGB BLD-MCNC: 9.2 G/DL
IMM GRANULOCYTES NFR BLD AUTO: 0.4 %
LYMPHOCYTES # BLD AUTO: 1.06 K/UL
LYMPHOCYTES NFR BLD AUTO: 14.5 %
MAN DIFF?: NORMAL
MCHC RBC-ENTMCNC: 30.3 PG
MCHC RBC-ENTMCNC: 31.8 GM/DL
MCV RBC AUTO: 95.1 FL
MONOCYTES # BLD AUTO: 0.53 K/UL
MONOCYTES NFR BLD AUTO: 7.3 %
NEUTROPHILS # BLD AUTO: 5.48 K/UL
NEUTROPHILS NFR BLD AUTO: 75.2 %
PLATELET # BLD AUTO: 283 K/UL
POTASSIUM SERPL-SCNC: 6.6 MMOL/L
PROT SERPL-MCNC: 6.8 G/DL
RBC # BLD: 3.04 M/UL
RBC # FLD: 12.6 %
SODIUM SERPL-SCNC: 139 MMOL/L
WBC # FLD AUTO: 7.29 K/UL

## 2022-01-30 ENCOUNTER — NON-APPOINTMENT (OUTPATIENT)
Age: 82
End: 2022-01-30

## 2022-02-01 LAB
ANION GAP SERPL CALC-SCNC: 11 MMOL/L
BUN SERPL-MCNC: 31 MG/DL
CALCIUM SERPL-MCNC: 9.7 MG/DL
CHLORIDE SERPL-SCNC: 108 MMOL/L
CO2 SERPL-SCNC: 20 MMOL/L
CREAT SERPL-MCNC: 1.35 MG/DL
GLUCOSE SERPL-MCNC: 122 MG/DL
POTASSIUM SERPL-SCNC: 5.7 MMOL/L
SODIUM SERPL-SCNC: 139 MMOL/L

## 2022-02-04 RX ORDER — LISINOPRIL 10 MG/1
10 TABLET ORAL DAILY
Qty: 90 | Refills: 3 | Status: DISCONTINUED | COMMUNITY
Start: 2020-11-06 | End: 2022-02-04

## 2022-02-16 ENCOUNTER — APPOINTMENT (OUTPATIENT)
Dept: CARDIOLOGY | Facility: CLINIC | Age: 82
End: 2022-02-16
Payer: MEDICARE

## 2022-02-16 ENCOUNTER — NON-APPOINTMENT (OUTPATIENT)
Age: 82
End: 2022-02-16

## 2022-02-16 VITALS
HEIGHT: 59 IN | TEMPERATURE: 98.7 F | DIASTOLIC BLOOD PRESSURE: 72 MMHG | BODY MASS INDEX: 36.36 KG/M2 | HEART RATE: 62 BPM | SYSTOLIC BLOOD PRESSURE: 145 MMHG | OXYGEN SATURATION: 100 %

## 2022-02-16 VITALS — WEIGHT: 180 LBS | BODY MASS INDEX: 36.36 KG/M2

## 2022-02-16 PROCEDURE — 99214 OFFICE O/P EST MOD 30 MIN: CPT

## 2022-02-16 PROCEDURE — 93000 ELECTROCARDIOGRAM COMPLETE: CPT

## 2022-02-17 NOTE — CARDIOLOGY SUMMARY
[de-identified] : \par 02/16/22 - sinus rhythm with sinus arrhythmia, nonspecific ST abnormality\par  [de-identified] : \par 08/30/19 (exercise MIBI) - 5 METS< 90% MPHR, 03:38 min, no ECG abnormalities, Duke score 3, no evidence for myocardial infarction or ischemia, LVEF 68%\par  [de-identified] : \par 06/20/21 - MAC, mild MR, mild MS (mean MV gradient 3 mmHg), normal LA, grossly normal LV systolic function, concentric LV remodeling, normal RV size and function, RVSP 29 mmHg, LVEF 60%\par

## 2022-02-17 NOTE — HISTORY OF PRESENT ILLNESS
[FreeTextEntry1] : Doing okay. Has low back pains from spinal stenosis. Denies chest pain, shortness of breath or palpitations. Had COVID in December. She had a cough and aches/pain.

## 2022-02-17 NOTE — DISCUSSION/SUMMARY
[Paroxysmal Atrial Fibrillation] : paroxysmal atrial fibrillation [Stable] : stable [Compensated] : compensated [Hypertension] : hypertension [FreeTextEntry1] : \par Currently stable from a cardiovascular standpoint. Hypertensive today. Appears euvolemic. History of paroxysmal atrial fibrillation ([id number] score 4). Currently in sinus rhythm. Most recent labs reviewed. Patient with CKD stage III (creatinine 1.35, eGFR 37). No ischemic or CHF symptoms. Patient with chronic back pains. Continue current medications including apixaban and metoprolol. ECG completed today and reviewed (findings as noted above). Follow up in 4-6 months.

## 2022-03-14 ENCOUNTER — APPOINTMENT (OUTPATIENT)
Dept: NEPHROLOGY | Facility: CLINIC | Age: 82
End: 2022-03-14
Payer: MEDICARE

## 2022-03-14 VITALS
WEIGHT: 179 LBS | DIASTOLIC BLOOD PRESSURE: 77 MMHG | SYSTOLIC BLOOD PRESSURE: 148 MMHG | HEART RATE: 69 BPM | TEMPERATURE: 97.4 F | BODY MASS INDEX: 36.08 KG/M2 | HEIGHT: 59 IN | OXYGEN SATURATION: 98 %

## 2022-03-14 VITALS — SYSTOLIC BLOOD PRESSURE: 136 MMHG | DIASTOLIC BLOOD PRESSURE: 76 MMHG

## 2022-03-14 PROCEDURE — 99213 OFFICE O/P EST LOW 20 MIN: CPT

## 2022-03-14 NOTE — PHYSICAL EXAM
[General Appearance - Alert] : alert [Sclera] : the sclera and conjunctiva were normal [Hearing Threshold Finger Rub Not [place]] : hearing was normal [Jugular Venous Distention Increased] : there was no jugular-venous distention [Auscultation Breath Sounds / Voice Sounds] : lungs were clear to auscultation bilaterally [Heart Rate And Rhythm] : heart rate was normal and rhythm regular [Normal] : the heart rate was normal [Regular] : the rhythm was regular [Systolic grade ___/6] : A grade [unfilled]/6 systolic murmur was heard. [Pitting Edema] : pitting edema present [___ +] : bilateral [unfilled]+ pitting edema to the ankles [Abdomen Tenderness] : non-tender [Urinary Bladder Findings] : the bladder was normal on palpation [No CVA Tenderness] : no ~M costovertebral angle tenderness [Abnormal Walk] : normal gait [] : no rash [No Focal Deficits] : no focal deficits [Oriented To Time, Place, And Person] : oriented to person, place, and time

## 2022-03-16 NOTE — HISTORY OF PRESENT ILLNESS
[FreeTextEntry1] : The major issue is related to chronic pain.  First from the spinal stenosis w/ pain radiating down both legs.  She also has bilateral rotator cuff disease.  She is taking Tylenol w/ codeine but has not relief except for 2 to 3 hours. \par The patient endorses continuing family issues related to her daughter.  Her older sister is also dying.  \par She is visiting another sister in [place] and is planning to drive there. \par Her labs were done by Dr. Bauer in January. Her creatinine was unchanged.

## 2022-03-16 NOTE — ASSESSMENT
[FreeTextEntry1] : 1. Chronic back pain secondary to lumbar spinal stenosis. Discussed that Tylenol w/ Codeine is not a long term strategy. We will start w/ low dose Gabapentin 100 mg at bed time and increase the dose gradually. The patient will keep me informed. \par 2. CKD stage III stable. Continue same regimen. Unfortunately l the patient cannot afford Farxiga.\par 3. Hypertension: adequately controlled.\par 4. DM2 controlled.\par 5 Paroxysmal atrial fibrillation. On Eliquis.\par Follow up in 3 months. \par

## 2022-03-16 NOTE — REASON FOR VISIT
[Follow-Up] : a follow-up visit [FreeTextEntry1] : Follow up for DM2, CKD stage 3b/A2, hypertension.

## 2022-03-16 NOTE — REVIEW OF SYSTEMS
[As Noted in HPI] : as noted in HPI [Depression] : depression [Negative] : Genitourinary [Feeling Tired] : feeling tired [FreeTextEntry5] : Paroxysmal atrial fibrillation on Eliquis

## 2022-04-14 ENCOUNTER — APPOINTMENT (OUTPATIENT)
Dept: ORTHOPEDIC SURGERY | Facility: CLINIC | Age: 82
End: 2022-04-14
Payer: MEDICARE

## 2022-04-14 PROCEDURE — 73030 X-RAY EXAM OF SHOULDER: CPT | Mod: LT

## 2022-04-14 PROCEDURE — 99213 OFFICE O/P EST LOW 20 MIN: CPT | Mod: 25

## 2022-04-14 PROCEDURE — 20610 DRAIN/INJ JOINT/BURSA W/O US: CPT | Mod: LT

## 2022-04-14 NOTE — PHYSICAL EXAM
[de-identified] : Pt is a pleasant 81 year old female, AAOx3 with no apparent distress, 36 BMI.  Physical examination of both shoulders reveals normal contours with no deformity, skin intact, with no signs of infection, no erythema, no swelling, no discoloration, no distal lymphedema, no patholaxity, no muscle atrophy noted. ROM of let shoulder reveals forward flexion to 90°,  external rotation 35°,  IR to L2. Motor strength 4/5 in ER, IR, and 3/5 FF in the scapular plane. No neurological deficits noted.\par  [de-identified] : X-rays were ordered, obtained, and interpreted by me today: 4 views of the left shoulder demonstrate minimal degenerative changes with a type 2 acromion morphology, with no acute fracture or dislocation.\par

## 2022-04-14 NOTE — DISCUSSION/SUMMARY
[de-identified] : Pt is a pleasant 81 year old female, with left shoulder pain secondary to rotator cuff tear. A lengthy discussion was held regarding the patient’s condition and treatment options including all risks, benefits, prognosis and outcomes of each were discussed in detail. The patient would like to continue with conservative management at this time and was advised on the use of NSAIDS for pain control, icing, activity modification, and possible cortisone injections. The patient would like to have a cortisone injection which she tolerated without any complications. She was advised to monitor her sugar levels due to the cortisone injection. The patient will contact me if there are any concerns otherwise follow up will be on a prn basis. The patient express understanding and all questions were answered.\par

## 2022-04-14 NOTE — PROCEDURE
[de-identified] : After careful discussion regarding the risks versus benefits of a corticosteroid injection the patient has elected to proceed with that treatment. Under sterile conditions, the patient received  a left  shoulder injection into the glenohumeral joint and subacromial space with 8 cc of half percent Marcaine without epinephrine and 2 cc of Betamethasone. The site was cleaned and a bandaid was applied. The patient tolerated the injection without problem.\par \par

## 2022-04-14 NOTE — CONSULT LETTER
[Dear  ___] : Dear  [unfilled], [Please see my note below.] : Please see my note below. [FreeTextEntry1] : I had the pleasure of evaluating your patient in the office today for complaints of left shoulder pain secondary to rotator cuff tear. I have enclosed a copy of today's office notes for your charts and for your review.\par \par Sincerely, \par \par Evangelsita Velasquez M.D.\par Professor and \par Department of Orthopedic Surgery\par [facility] [place]\par

## 2022-04-14 NOTE — HISTORY OF PRESENT ILLNESS
[de-identified] : 82 y/o RHD female who is retired  presents with right shoulder pain due to an injury on 7/2019 at a [employer] and was found to have mild degenerative changes and partial cuff tear on MRI presents with left shoulder pain for years, which she thinks she might of hurt it the same time as the fall in 2019. She states the pain is getting worse. The pain is keeping her up at night. She is taking Tylenol with codeine for the pain.  \par \par Hx: DM medication controlled. HTN, Hyperlipidemia. \par Developed Afib on Eliquis due to Steroid tabs.

## 2022-04-14 NOTE — REASON FOR VISIT
[Initial Visit] : an initial visit for [FreeTextEntry2] : Left shoulder pain. Referred by Dr. Joe Jason

## 2022-04-27 ENCOUNTER — APPOINTMENT (OUTPATIENT)
Dept: GERIATRICS | Facility: CLINIC | Age: 82
End: 2022-04-27
Payer: MEDICARE

## 2022-04-27 VITALS
TEMPERATURE: 98 F | OXYGEN SATURATION: 97 % | RESPIRATION RATE: 15 BRPM | WEIGHT: 185 LBS | DIASTOLIC BLOOD PRESSURE: 50 MMHG | SYSTOLIC BLOOD PRESSURE: 126 MMHG | HEART RATE: 66 BPM | BODY MASS INDEX: 37.37 KG/M2

## 2022-04-27 PROCEDURE — 99214 OFFICE O/P EST MOD 30 MIN: CPT

## 2022-04-27 NOTE — REVIEW OF SYSTEMS
[Lower Ext Edema] : lower extremity edema [As Noted in HPI] : as noted in HPI [Negative] : Heme/Lymph [Chest Pain] : no chest pain [Palpitations] : no palpitations

## 2022-04-27 NOTE — HISTORY OF PRESENT ILLNESS
[No falls in past year] : Patient reported no falls in the past year [0] : 1) Little interest or pleasure doing things: Not at all (0) [2] : 2) Feeling down, depressed, or hopeless for more than half of the days (2) [PHQ-2 Positive] : PHQ-2 Positive [I have developed a follow-up plan documented below in the note.] : I have developed a follow-up plan documented below in the note. [FreeTextEntry1] : Mrs. Simmons presents for followup. Overall she feels physically well. Her left shoulder feels better after steroid injection. Emotionally she is going through a hard time. She lost her sister 2 weeks ago and another sister a year ago. Her daughter, who lives with her suffers from depression and refuses to seek help.\par The patient denies shortness of breath, chest pain, palpitations, lightheadedness, vertigo. [[id number]] : 2

## 2022-06-21 ENCOUNTER — APPOINTMENT (OUTPATIENT)
Dept: NEPHROLOGY | Facility: CLINIC | Age: 82
End: 2022-06-21
Payer: MEDICARE

## 2022-06-21 VITALS
SYSTOLIC BLOOD PRESSURE: 122 MMHG | HEIGHT: 59 IN | BODY MASS INDEX: 36.89 KG/M2 | OXYGEN SATURATION: 97 % | TEMPERATURE: 97.2 F | WEIGHT: 182.98 LBS | HEART RATE: 77 BPM | DIASTOLIC BLOOD PRESSURE: 67 MMHG

## 2022-06-21 DIAGNOSIS — M77.8 OTHER ENTHESOPATHIES, NOT ELSEWHERE CLASSIFIED: ICD-10-CM

## 2022-06-21 PROCEDURE — 99213 OFFICE O/P EST LOW 20 MIN: CPT

## 2022-06-21 RX ORDER — AZITHROMYCIN 250 MG/1
250 TABLET, FILM COATED ORAL
Qty: 6 | Refills: 0 | Status: DISCONTINUED | COMMUNITY
Start: 2022-01-01

## 2022-06-22 ENCOUNTER — NON-APPOINTMENT (OUTPATIENT)
Age: 82
End: 2022-06-22

## 2022-06-22 PROBLEM — M77.8 LEFT SHOULDER TENDONITIS: Status: ACTIVE | Noted: 2022-06-22

## 2022-06-22 LAB
ALBUMIN SERPL ELPH-MCNC: 4.6 G/DL
ANION GAP SERPL CALC-SCNC: 14 MMOL/L
BASOPHILS # BLD AUTO: 0.12 K/UL
BASOPHILS NFR BLD AUTO: 1.7 %
BUN SERPL-MCNC: 71 MG/DL
CALCIUM SERPL-MCNC: 9.8 MG/DL
CHLORIDE SERPL-SCNC: 106 MMOL/L
CO2 SERPL-SCNC: 21 MMOL/L
CREAT SERPL-MCNC: 1.86 MG/DL
EGFR: 27 ML/MIN/1.73M2
EOSINOPHIL # BLD AUTO: 0.13 K/UL
EOSINOPHIL NFR BLD AUTO: 1.8 %
GLUCOSE SERPL-MCNC: 100 MG/DL
HCT VFR BLD CALC: 28.6 %
HGB BLD-MCNC: 9.5 G/DL
IMM GRANULOCYTES NFR BLD AUTO: 0.3 %
LYMPHOCYTES # BLD AUTO: 1.49 K/UL
LYMPHOCYTES NFR BLD AUTO: 20.9 %
MAN DIFF?: NORMAL
MCHC RBC-ENTMCNC: 30.3 PG
MCHC RBC-ENTMCNC: 33.2 GM/DL
MCV RBC AUTO: 91.1 FL
MONOCYTES # BLD AUTO: 0.68 K/UL
MONOCYTES NFR BLD AUTO: 9.6 %
NEUTROPHILS # BLD AUTO: 4.68 K/UL
NEUTROPHILS NFR BLD AUTO: 65.7 %
PHOSPHATE SERPL-MCNC: 4.9 MG/DL
PLATELET # BLD AUTO: 336 K/UL
POTASSIUM SERPL-SCNC: 5.4 MMOL/L
RBC # BLD: 3.14 M/UL
RBC # FLD: 12.1 %
SODIUM SERPL-SCNC: 140 MMOL/L
WBC # FLD AUTO: 7.12 K/UL

## 2022-06-22 RX ORDER — FUROSEMIDE 20 MG/1
20 TABLET ORAL
Qty: 120 | Refills: 3 | Status: DISCONTINUED | COMMUNITY
Start: 2021-10-07 | End: 2022-06-22

## 2022-06-22 NOTE — PHYSICAL EXAM
[General Appearance - Alert] : alert [Sclera] : the sclera and conjunctiva were normal [Hearing Threshold Finger Rub Not [place]] : hearing was normal [Jugular Venous Distention Increased] : there was no jugular-venous distention [Auscultation Breath Sounds / Voice Sounds] : lungs were clear to auscultation bilaterally [Heart Rate And Rhythm] : heart rate was normal and rhythm regular [Normal] : the heart rate was normal [Regular] : the rhythm was regular [Systolic grade ___/6] : A grade [unfilled]/6 systolic murmur was heard. [Edema] : there was no peripheral edema [___ +] : bilateral [unfilled]+ pitting edema to the ankles [Abdomen Tenderness] : non-tender [Urinary Bladder Findings] : the bladder was normal on palpation [No CVA Tenderness] : no ~M costovertebral angle tenderness [Abnormal Walk] : normal gait [] : no rash [No Focal Deficits] : no focal deficits [Oriented To Time, Place, And Person] : oriented to person, place, and time

## 2022-06-22 NOTE — REASON FOR VISIT
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

## 2022-06-22 NOTE — HISTORY OF PRESENT ILLNESS
[FreeTextEntry1] : Mrs. Simmons has not been able to tolerate Lokelma.  She states that even if she mixes the powder completely in water, it gets stuck in her throat and she vomits.  She will not take it again.\par The patient continuously struggles with left shoulder pain from rotator cuff injury. Injections have helped w/ the right shoulder but did not help on the left. She has been taking Tylenol. She still has some pills left of the proscription for Hydrocodone 7.5 mg and Acetaminophen 325 mg on March 14, 2022 (120 tablets)   She is asking for a refill. She states that she is aware of the addition potential of this medication but that she only takes it if the pain is unbearable.  I have discussed w/ the patient that narcotics is not a long term solution for chronic pain and that she has to go back to the orthopedic surgeon for follow up.

## 2022-06-22 NOTE — ASSESSMENT
[FreeTextEntry1] : 1. Chronic pain in the left shoulder.  After a long discussion with the patient I have renewed the Hydrocodone/Acetaminophen, but gave her only 30 tablets. We discussed that I will not prescribe any further narcotics and that she needs to find a better solution of her left shoulder pain.  The patient has agreed. She has no personal history of addition (she stopped smoking and is not drinking alcohol). Her father was a alcoholic. \par 2 Hyperkalemia: will check labs today. If K is above normal will add Na Bicarbonate with each meal and change from Furosemide to Bumex 0.5 mg twice per day.\par 3. Anemia of CKD. Discussed possible use of Aranesp but K needs to be controlled first.\par 4. CKD stage 3. Check labs. \par 5. Type II DM. Follow up w/ PCP. \par Return in 4 months.

## 2022-07-14 ENCOUNTER — APPOINTMENT (OUTPATIENT)
Dept: CARDIOLOGY | Facility: CLINIC | Age: 82
End: 2022-07-14

## 2022-07-27 ENCOUNTER — APPOINTMENT (OUTPATIENT)
Dept: GERIATRICS | Facility: CLINIC | Age: 82
End: 2022-07-27

## 2022-07-27 VITALS
RESPIRATION RATE: 18 BRPM | TEMPERATURE: 97.4 F | SYSTOLIC BLOOD PRESSURE: 136 MMHG | DIASTOLIC BLOOD PRESSURE: 60 MMHG | OXYGEN SATURATION: 99 % | WEIGHT: 190 LBS | BODY MASS INDEX: 38.38 KG/M2 | HEART RATE: 56 BPM

## 2022-07-27 DIAGNOSIS — R26.0 ATAXIC GAIT: ICD-10-CM

## 2022-07-27 DIAGNOSIS — Z59.9 PROBLEM RELATED TO HOUSING AND ECONOMIC CIRCUMSTANCES, UNSPECIFIED: ICD-10-CM

## 2022-07-27 DIAGNOSIS — M48.062 SPINAL STENOSIS, LUMBAR REGION WITH NEUROGENIC CLAUDICATION: ICD-10-CM

## 2022-07-27 PROCEDURE — 99215 OFFICE O/P EST HI 40 MIN: CPT

## 2022-07-27 SDOH — ECONOMIC STABILITY - INCOME SECURITY: PROBLEM RELATED TO HOUSING AND ECONOMIC CIRCUMSTANCES, UNSPECIFIED: Z59.9

## 2022-07-27 NOTE — PHYSICAL EXAM
[Alert] : alert [No Acute Distress] : in no acute distress [Sclera] : the sclera and conjunctiva were normal [Normal Oral Mucosa] : normal oral mucosa [No Oral Pallor] : no oral pallor [No Oral Cyanosis] : no oral cyanosis [Normal Appearance] : the appearance of the neck was normal [No Respiratory Distress] : no respiratory distress [No Acc Muscle Use] : no accessory muscle use [Respiration, Rhythm And Depth] : normal respiratory rhythm and effort [Auscultation Breath Sounds / Voice Sounds] : lungs were clear to auscultation bilaterally [Normal PMI] : the apical impulse was abnormal [Normal S1, S2] : normal S1 and S2 [Heart Rate And Rhythm] : heart rate was normal and rhythm regular [Edema] : edema was not present [Bowel Sounds] : normal bowel sounds [Abdomen Tenderness] : non-tender [Abdomen Soft] : soft [Cervical Lymph Nodes Enlarged Posterior Bilaterally] : posterior cervical [Supraclavicular Lymph Nodes Enlarged Bilaterally] : supraclavicular [Cervical Lymph Nodes Enlarged Anterior Bilaterally] : anterior cervical, supraclavicular [Axillary Lymph Nodes Enlarged Bilaterally] : axillary [No CVA Tenderness] : no CVA  tenderness [No Spinal Tenderness] : no spinal tenderness [Normal Color / Pigmentation] : normal skin color and pigmentation [] : no rash [de-identified] : elderly obese female, tearful today [de-identified] : ataxic gait [de-identified] : tearful

## 2022-07-27 NOTE — HISTORY OF PRESENT ILLNESS
[Reviewed updated] : Reviewed, updated [No falls in past year] : Patient reported no falls in the past year [1] : 2) Feeling down, depressed, or hopeless for several days (1) [PHQ-2 Positive] : PHQ-2 Positive [I have developed a follow-up plan documented below in the note.] : I have developed a follow-up plan documented below in the note. [I will adhere to the patient's wishes.] : I will adhere to the patient's wishes. [FreeTextEntry1] : Ms. DARYL MENCHACA is a 80yo F with PMHx of CKD III, colon adenocarcinoma,DM II, anemia, anxiety and depression presents by herself for follow up. \par \par #colon adenocarcinoma/anemia\par -appreciated Dr. Bauer note 4/27/22;given a referral to hematology/oncology\par -visit? did not go\par \par #CKD III\par -appreciated Dr. Sumner note; chronic L shoulder pain taking hydrocodone/acetaminophen, not tolerating lokelma, NA Bicarb TID added\par -taking Na bicarb TID \par \par #anxiety/depression\par -very tearful today, daughter lives with pt. \par -reports her daughter has depression, does not want help \par -daughter does not want to move out "had a problem with the neighbor" \par -reports daughter is a compulsive  \par -today very tearful\par -not open to taking antidepressant \par \par #back pain\par -reports low back pain is 9/10 at worst, denies any recent injury\par -taking 1/2 tab of hydrocodone \par -takings tylenol 1g up to TID, most of the time it helps \par -reports lumbar spine that radiates to the legs\par -tried PT in the past and helped her pain \par -stated "ortho can't do anything else for him" , wants to limit specialists due to copay cost\par \par #cards\par -canceled appt. with cards due to copays\par \par Chronic back  pain. Denies acute visits/falls. Denies HA/dizziness, SOB/CP, abdominal pain, dysuria, reports daily BMs regular. "everything is money"\par -stated  meds are too expensive\par -today gave good rx discount codes\par -today requesting refill for hydrocodone \par -advised speaking to SW today re difficulty at home,declined \par \par Plan \par - d/w Dr. Bauer re refill for  hydrocodone/acetaminiphen, agreed ISTOP Reference #: [id number],sent today\par -PT rx, does not want to go\par -f/u with Dr. Bauer in 1 m [AdvancecareDate] : 7/22/22 [FreeTextEntry4] : pt. stated I do not want to say which daughter can help with medical decisions today

## 2022-08-19 ENCOUNTER — APPOINTMENT (OUTPATIENT)
Dept: NEPHROLOGY | Facility: CLINIC | Age: 82
End: 2022-08-19

## 2022-08-19 VITALS
HEART RATE: 59 BPM | TEMPERATURE: 97.7 F | WEIGHT: 191.8 LBS | SYSTOLIC BLOOD PRESSURE: 134 MMHG | BODY MASS INDEX: 38.67 KG/M2 | DIASTOLIC BLOOD PRESSURE: 70 MMHG | HEIGHT: 59 IN | OXYGEN SATURATION: 97 %

## 2022-08-19 PROCEDURE — 99213 OFFICE O/P EST LOW 20 MIN: CPT

## 2022-08-19 NOTE — REASON FOR VISIT
[Follow-Up] : a follow-up visit [FreeTextEntry1] : No side effects from NA Bicarb.  Labs today, BP good, Home situation same.

## 2022-08-19 NOTE — REVIEW OF SYSTEMS
[Feeling Tired] : feeling tired [Chest Pain] : no chest pain [Palpitations] : no palpitations [SOB on Exertion] : shortness of breath during exertion [Anxiety] : anxiety [Negative] : Neurological [FreeTextEntry9] : Chronic low back pain and shoulder pain.

## 2022-08-19 NOTE — HISTORY OF PRESENT ILLNESS
[FreeTextEntry1] : Shoulder pain controlled w/ taking one Tylenol and half of Tylenol w/ Hydrocodone (2.5 mg of hydrocodone and 250 mg of Tylenol) once or twice per day.  Continues to have issues w/ her daughter who is depressed and is a compulsive . Other issue is exertional dyspnea. She has chronic atrial fibrillation, rate controlled and is on Eliquis.\par Purpose of this visit is to follow up on diabetic CKD stage IV and hypertension.

## 2022-08-19 NOTE — PHYSICAL EXAM
RN messaged patient to see when she could come in for the UDS nurse visit. Awaiting her response.    [General Appearance - Alert] : alert [Sclera] : the sclera and conjunctiva were normal [Hearing Threshold Finger Rub Not [place]] : hearing was normal [Jugular Venous Distention Increased] : there was no jugular-venous distention [Auscultation Breath Sounds / Voice Sounds] : lungs were clear to auscultation bilaterally [Heart Rate And Rhythm] : heart rate was normal and rhythm regular [Normal] : the heart rate was normal [Regular] : the rhythm was regular [Systolic grade ___/6] : A grade [unfilled]/6 systolic murmur was heard. [Edema] : there was no peripheral edema [___ +] : bilateral [unfilled]+ pitting edema to the ankles [Abdomen Tenderness] : non-tender [Urinary Bladder Findings] : the bladder was normal on palpation [No CVA Tenderness] : no ~M costovertebral angle tenderness [Abnormal Walk] : normal gait [] : no rash [No Focal Deficits] : no focal deficits [Oriented To Time, Place, And Person] : oriented to person, place, and time

## 2022-08-19 NOTE — ASSESSMENT
[FreeTextEntry1] : 1. CKD stage IV. Will repeat labs today to assess rate of progression. The patient could not afford SGLT2 inhbitors.\par 2. Type II DM, Check A1c.\par 3. Hypertension: well controlled no changes in her regimen.\par 4. Anemia of CKD. Check H/H. May require ANUP.\par 5. Atrial fibrillation. Rate controlled. Continue Eiliquis.\par Will check labs and call the patient w/ results on Monday.\par Return in 3 months.\par

## 2022-08-23 LAB
ALBUMIN SERPL ELPH-MCNC: 4.2 G/DL
ALP BLD-CCNC: 71 U/L
ALT SERPL-CCNC: 8 U/L
ANION GAP SERPL CALC-SCNC: 12 MMOL/L
APPEARANCE: CLEAR
AST SERPL-CCNC: 16 U/L
BACTERIA: NEGATIVE
BASOPHILS # BLD AUTO: 0.09 K/UL
BASOPHILS NFR BLD AUTO: 1.4 %
BILIRUB SERPL-MCNC: 0.3 MG/DL
BILIRUBIN URINE: NEGATIVE
BLOOD URINE: NEGATIVE
BUN SERPL-MCNC: 32 MG/DL
CALCIUM SERPL-MCNC: 9.7 MG/DL
CHLORIDE SERPL-SCNC: 107 MMOL/L
CO2 SERPL-SCNC: 22 MMOL/L
COLOR: NORMAL
CREAT SERPL-MCNC: 1.23 MG/DL
CREAT SPEC-SCNC: 67 MG/DL
CREAT/PROT UR: 0.6 RATIO
EGFR: 45 ML/MIN/1.73M2
EOSINOPHIL # BLD AUTO: 0.12 K/UL
EOSINOPHIL NFR BLD AUTO: 1.9 %
ESTIMATED AVERAGE GLUCOSE: 137 MG/DL
GLUCOSE QUALITATIVE U: NEGATIVE
HBA1C MFR BLD HPLC: 6.4 %
HCT VFR BLD CALC: 29.5 %
HGB BLD-MCNC: 8.9 G/DL
HYALINE CASTS: 0 /LPF
IMM GRANULOCYTES NFR BLD AUTO: 0.2 %
KETONES URINE: NEGATIVE
LEUKOCYTE ESTERASE URINE: NEGATIVE
LYMPHOCYTES # BLD AUTO: 1.08 K/UL
LYMPHOCYTES NFR BLD AUTO: 17.2 %
MAN DIFF?: NORMAL
MCHC RBC-ENTMCNC: 30 PG
MCHC RBC-ENTMCNC: 30.2 GM/DL
MCV RBC AUTO: 99.3 FL
MICROSCOPIC-UA: NORMAL
MONOCYTES # BLD AUTO: 0.59 K/UL
MONOCYTES NFR BLD AUTO: 9.4 %
NEUTROPHILS # BLD AUTO: 4.38 K/UL
NEUTROPHILS NFR BLD AUTO: 69.9 %
NITRITE URINE: NEGATIVE
PH URINE: 6
PLATELET # BLD AUTO: 330 K/UL
POTASSIUM SERPL-SCNC: 5.4 MMOL/L
PROT SERPL-MCNC: 6.3 G/DL
PROT UR-MCNC: 41 MG/DL
PROTEIN URINE: ABNORMAL
RBC # BLD: 2.97 M/UL
RBC # FLD: 12.3 %
RED BLOOD CELLS URINE: 0 /HPF
SODIUM SERPL-SCNC: 141 MMOL/L
SPECIFIC GRAVITY URINE: 1.01
SQUAMOUS EPITHELIAL CELLS: 1 /HPF
UROBILINOGEN URINE: NORMAL
WBC # FLD AUTO: 6.27 K/UL
WHITE BLOOD CELLS URINE: 1 /HPF

## 2022-08-31 LAB
FERRITIN SERPL-MCNC: 44 NG/ML
FOLATE RBC-MCNC: 1102 NG/ML
HCT VFR BLD CALC: 29.5 %
IRON SATN MFR SERPL: 12 %
IRON SERPL-MCNC: 36 UG/DL
TIBC SERPL-MCNC: 312 UG/DL
UIBC SERPL-MCNC: 276 UG/DL
VIT B12 SERPL-MCNC: 372 PG/ML

## 2022-09-06 ENCOUNTER — APPOINTMENT (OUTPATIENT)
Dept: GASTROENTEROLOGY | Facility: CLINIC | Age: 82
End: 2022-09-06
Payer: MEDICARE

## 2022-09-06 VITALS
SYSTOLIC BLOOD PRESSURE: 130 MMHG | HEART RATE: 87 BPM | BODY MASS INDEX: 38.4 KG/M2 | OXYGEN SATURATION: 98 % | WEIGHT: 190.5 LBS | DIASTOLIC BLOOD PRESSURE: 79 MMHG | HEIGHT: 59 IN

## 2022-09-06 DIAGNOSIS — R13.10 DYSPHAGIA, UNSPECIFIED: ICD-10-CM

## 2022-09-06 PROCEDURE — 99204 OFFICE O/P NEW MOD 45 MIN: CPT

## 2022-09-06 NOTE — ASSESSMENT
[FreeTextEntry1] : 81F w colon ca s/p resection 2017 (no chemo), afib on eliquis, CKD, chronic back pain, long standing dysphagia to solids with prior negative EGD (per patient), referred by Dr Ring for endoscopic evaluation as part of workup of anemia. Most recently w/ normal bowel movements w/o melena, hematochezia. Denies abd pain, n/v, weight loss. No fhx of esophageal, gastric, colon ca. \par \par - Labs reviewed --> Hgb 8.9 (previously 9's range), MCV 99, Irno 36, ferritin 44 (previously 66) \par - s/p colon resection in 2017, per patient had egd/colon in 2020 that was normal ( no report available). Patient to fax over. \par - will schedule repeat egd/colon as part of anemia w/u. R/B reviewed. Prep sent to pharmacy on file. \par - she will discuss cardiac clearance and a/c management at her cardiology f/u appointment tomorrow\par - iron supplementation per PCP/renal teams \par \par RTC after egd/colon. \par

## 2022-09-06 NOTE — HISTORY OF PRESENT ILLNESS
[de-identified] : 81F w colon ca s/p resection 2017 (no chemo), afib on eliquis, CKD, chronic back pain, referred by Dr Ring for workup of anemia. \par \par Most recent labs show -- Hgb 8.9 (previously 9's range), MCV 99, Irno 36, ferritin 44 (previously 66) \par \par Patient states she has long standing hx of ALFREDO, previously took PO iron but now off. \par She reports normal bowel movements daily. Denies abd pain, n/v, hematochezia, melena, weight loss. \par \par She was diagnosed w/ colon ca in 2017, is s/p resection. After surgical resection reports having several colonoscopy and an EGD at OSH. Last egd/colon was two years ago in 2020. No prior capsule endoscopy. \par \par Lastly reports long standing hx of dysphagia. Symptoms primarily to solids, feels that food is stuck in chest and passes after she drinks water. REports multiple prior EGDs done for this that were normal. Generally eats a regular diet and able to eat all solids if she cuts into small pieces. \par \par No fhx of esophagea, gastric or colon cancer.

## 2022-09-07 ENCOUNTER — NON-APPOINTMENT (OUTPATIENT)
Age: 82
End: 2022-09-07

## 2022-09-07 ENCOUNTER — APPOINTMENT (OUTPATIENT)
Dept: CARDIOLOGY | Facility: CLINIC | Age: 82
End: 2022-09-07

## 2022-09-07 VITALS
HEART RATE: 64 BPM | WEIGHT: 190 LBS | SYSTOLIC BLOOD PRESSURE: 146 MMHG | HEIGHT: 59 IN | BODY MASS INDEX: 38.3 KG/M2 | OXYGEN SATURATION: 100 % | DIASTOLIC BLOOD PRESSURE: 82 MMHG

## 2022-09-07 PROCEDURE — 99214 OFFICE O/P EST MOD 30 MIN: CPT

## 2022-09-07 PROCEDURE — 93000 ELECTROCARDIOGRAM COMPLETE: CPT

## 2022-09-08 NOTE — CARDIOLOGY SUMMARY
[de-identified] : \par 09/07/22 - sinus rhythm with sinus arrhythmia, nonspecific T-wave abnormality\par  [de-identified] : \par 08/30/19 (exercise MIBI) - 5 METS< 90% MPHR, 03:38 min, no ECG abnormalities, Duke score 3, no evidence for myocardial infarction or ischemia, LVEF 68%\par  [de-identified] : \par 06/20/21 - MAC, mild MR, mild MS (mean MV gradient 3 mmHg), normal LA, grossly normal LV systolic function, concentric LV remodeling, normal RV size and function, RVSP 29 mmHg, LVEF 60%\par

## 2022-09-08 NOTE — HISTORY OF PRESENT ILLNESS
[FreeTextEntry1] : Doing okay but has chronic back pains that radiate down to right leg. Takes Tylenol for pain. Denies chest pain, shortness of breath or palpitations. Gets some leg swelling at times. Patient is anticipating EGD/colonoscopy on 10/7/22 for workup of anemia and feeling of food getting stuck in epigastric area. States that she has to drink some water for food to go down.

## 2022-09-08 NOTE — DISCUSSION/SUMMARY
[Paroxysmal Atrial Fibrillation] : paroxysmal atrial fibrillation [Stable] : stable [Compensated] : compensated [Hypertension] : hypertension [FreeTextEntry1] : \par Currently stable from a cardiovascular standpoint. Hypertensive today. Patient with either mild volume overload or more likely dependent edema. History of paroxysmal atrial fibrillation ([id number] score 4). Currently in sinus rhythm with sinus arrhythmia. Most recent labs reviewed. Patient with CKD stage III (creatinine 1.23, eGFR 45). No ischemic or CHF symptoms. Patient with chronic back pains. Continue current medications including apixaban and metoprolol. ECG completed today and reviewed (findings as noted above). May consider changing or reducing amlodipine given peripheral edema. Patient to call our office to discuss after her colonoscopy. Follow up in 6 months. [EKG obtained to assist in diagnosis and management of assessed problem(s)] : EKG obtained to assist in diagnosis and management of assessed problem(s)

## 2022-09-08 NOTE — PHYSICAL EXAM
[Well Developed] : well developed [Well Nourished] : well nourished [No Acute Distress] : no acute distress [Obese] : obese [Normal Conjunctiva] : normal conjunctiva [Normal Venous Pressure] : normal venous pressure [No Carotid Bruit] : no carotid bruit [No Murmur] : no murmur [No Rub] : no rub [No Gallop] : no gallop [Clear Lung Fields] : clear lung fields [Good Air Entry] : good air entry [No Respiratory Distress] : no respiratory distress  [Soft] : abdomen soft [Non Tender] : non-tender [Normal Gait] : normal gait [No Cyanosis] : no cyanosis [No Rash] : no rash [No Skin Lesions] : no skin lesions [Moves all extremities] : moves all extremities [No Focal Deficits] : no focal deficits [Normal Speech] : normal speech [Alert and Oriented] : alert and oriented [de-identified] : irregular [de-identified] : bilateral LE 1+ pitting edema

## 2022-10-03 ENCOUNTER — RX RENEWAL (OUTPATIENT)
Age: 82
End: 2022-10-03

## 2022-10-07 ENCOUNTER — APPOINTMENT (OUTPATIENT)
Dept: GASTROENTEROLOGY | Facility: HOSPITAL | Age: 82
End: 2022-10-07

## 2022-10-25 ENCOUNTER — APPOINTMENT (OUTPATIENT)
Dept: NEPHROLOGY | Facility: CLINIC | Age: 82
End: 2022-10-25

## 2022-10-27 ENCOUNTER — APPOINTMENT (OUTPATIENT)
Dept: GERIATRICS | Facility: CLINIC | Age: 82
End: 2022-10-27

## 2022-10-27 VITALS
HEART RATE: 62 BPM | BODY MASS INDEX: 39.18 KG/M2 | SYSTOLIC BLOOD PRESSURE: 140 MMHG | RESPIRATION RATE: 18 BRPM | DIASTOLIC BLOOD PRESSURE: 70 MMHG | WEIGHT: 194 LBS | OXYGEN SATURATION: 97 % | TEMPERATURE: 97.7 F

## 2022-10-27 DIAGNOSIS — Z00.00 ENCOUNTER FOR GENERAL ADULT MEDICAL EXAMINATION W/OUT ABNORMAL FINDINGS: ICD-10-CM

## 2022-10-27 PROCEDURE — G0008: CPT

## 2022-10-27 PROCEDURE — 90662 IIV NO PRSV INCREASED AG IM: CPT

## 2022-10-27 PROCEDURE — 99214 OFFICE O/P EST MOD 30 MIN: CPT | Mod: 25

## 2022-10-27 RX ORDER — GABAPENTIN 100 MG/1
100 CAPSULE ORAL
Qty: 90 | Refills: 3 | Status: DISCONTINUED | COMMUNITY
Start: 2022-03-14 | End: 2022-10-27

## 2022-10-27 RX ORDER — HYDROCODONE BITARTRATE AND ACETAMINOPHEN 5; 300 MG/1; MG/1
5-300 TABLET ORAL
Qty: 28 | Refills: 0 | Status: DISCONTINUED | COMMUNITY
Start: 2022-03-14 | End: 2022-10-27

## 2022-10-27 RX ORDER — FERROUS GLUCONATE 324(38)MG
324 (38 FE) TABLET ORAL 3 TIMES DAILY
Qty: 90 | Refills: 3 | Status: DISCONTINUED | COMMUNITY
Start: 2022-08-31 | End: 2022-10-27

## 2022-10-27 NOTE — END OF VISIT
[] : Fellow [FreeTextEntry3] : Mrs. Simmons was seen with Dr. Danielson, geriatric fellow.  I obtained a detailed interval history and personally examined the patient.  I discussed my findings and plan with the patient and the fellow, reviewed Dr. Danielson's note and agree with assessment plan.  Overall the patient is doing well.  Anemia noted, again referred to hematology.  She will also need repeat EGD and colonoscopy in the process of being scheduled.

## 2022-10-27 NOTE — HISTORY OF PRESENT ILLNESS
[No falls in past year] : Patient reported no falls in the past year [PHQ-2 Positive] : PHQ-2 Positive [I have developed a follow-up plan documented below in the note.] : I have developed a follow-up plan documented below in the note. [Completely Independent] : Completely independent. [FreeTextEntry1] : 81F with PMHx of atrial fibrillation on Eliquis, GERD, HTN, HLD, CKD stage 3B/4, colon adenocarcinoma, chronic back pain, DM II, anemia, anxiety and depression presents by herself for follow up. \par \par Since last office visit, the patient was evaluated by GI for anemia and scheduled for EGD/colonoscopy. She was seen by cardiology for preoperative risk assessment. Colonoscopy is now postponed due to insurance issues.\par \par Atrial Fibrillation: Cardiology following. Currently on Eliquis. No sign of active bleeding. She is compliant with medications. \par \par CKD: Nephrology following. Currently on sodium bicarbonate. Not taking iron supplement - was bothering her stomach. Referred to hematology in past - patient has not followed up\par \par Depression/Anxiety: Patient has significant stressors (daughter with depression/compulsive shopping). Declined antidepressant on prior visits. Denies SI/HI. Her two other children live nearby (one daughter lives [place], her son visits her every day) \par \par Chronic Back Pain: Stable. Currently on hydrocodone/acetaminophen - she takes 0.5 tablet as needed, usually twice a day. Patient did not want to follow-up with orthopedics and PT-  "does not help"\par \par Type II DM: Currently on Amaryl and metformin. She checks blood glucose at home - usually ranges 100-150s. She had episodes of hypoglycemia (60-70s) usually when she skips meals. \par \par Denies recent falls/ED and urgent care visits/hospitalizations. [FreeTextEntry4] : Due for mammogram [Driving Concerns] : not driving or driving without noted concerns

## 2022-12-06 ENCOUNTER — APPOINTMENT (OUTPATIENT)
Dept: NEPHROLOGY | Facility: CLINIC | Age: 82
End: 2022-12-06

## 2022-12-06 VITALS
HEART RATE: 71 BPM | SYSTOLIC BLOOD PRESSURE: 147 MMHG | OXYGEN SATURATION: 97 % | DIASTOLIC BLOOD PRESSURE: 65 MMHG | BODY MASS INDEX: 38.91 KG/M2 | HEIGHT: 59 IN | WEIGHT: 193 LBS | TEMPERATURE: 97.4 F

## 2022-12-06 VITALS — SYSTOLIC BLOOD PRESSURE: 132 MMHG | DIASTOLIC BLOOD PRESSURE: 68 MMHG

## 2022-12-06 PROCEDURE — 99213 OFFICE O/P EST LOW 20 MIN: CPT

## 2022-12-06 NOTE — HISTORY OF PRESENT ILLNESS
[FreeTextEntry1] : The patient continues to have the same issues with her back and knee.  She was seen by GI in September for follow up of iron deficiency anemia.  History of colon adenocarcinoma. Upper and lower endoscopy were schedule but the patient did not keep the appointment because her sister in [place] is sick w/ Dementia. Her care giver is her neighbor who now needs surgery. The patient will drive up to [place] tomorrow and stay with her sister for an indeterminate amount of time. \par She states that she has some chronic swelling of her legs and that her cardiologist told her was from the Amlodipine. \par The purpose of this visit it to follow up on hypertension and stage III Proteinuric Diabetic CKD.

## 2022-12-06 NOTE — REVIEW OF SYSTEMS
[Feeling Tired] : feeling tired [SOB on Exertion] : shortness of breath during exertion [Anxiety] : anxiety [Negative] : Neurological [Chest Pain] : no chest pain [Palpitations] : no palpitations [As Noted in HPI] : as noted in HPI [FreeTextEntry9] : Chronic low back pain and shoulder pain.

## 2022-12-06 NOTE — ASSESSMENT
[FreeTextEntry1] : 1. CKD stage IV. Will repeat labs today to assess rate of progression. The patient could not afford SGLT2 inhbitors.\par 2. Type II DM, Check A1c.\par 3. Hypertension: well controlled no changes in her regimen.\par 4. Anemia related to CKD and iron deficiency. Will check iron and TIBC. May require IV Iron as she is not able to tolerate oral Iron. \par 5. Atrial fibrillation. Rate controlled. Continue Eliquis.\par Will check labs and call the patient tomorrow before she leaves for [place]. Will also check for K (hyperkalemia) and urine protein to creatinine ratio (proteinuria). \par Return in 3 months.\par

## 2022-12-06 NOTE — PHYSICAL EXAM
[General Appearance - Alert] : alert [Sclera] : the sclera and conjunctiva were normal [Hearing Threshold Finger Rub Not [place]] : hearing was normal [Jugular Venous Distention Increased] : there was no jugular-venous distention [Auscultation Breath Sounds / Voice Sounds] : lungs were clear to auscultation bilaterally [Normal] : the heart rate was normal [Systolic grade ___/6] : A grade [unfilled]/6 systolic murmur was heard. [___ +] : bilateral [unfilled]+ pitting edema to the ankles [Abdomen Tenderness] : non-tender [Urinary Bladder Findings] : the bladder was normal on palpation [No CVA Tenderness] : no ~M costovertebral angle tenderness [Abnormal Walk] : normal gait [] : no rash [No Focal Deficits] : no focal deficits [Oriented To Time, Place, And Person] : oriented to person, place, and time [Irregularly Irregular] : the rhythm was irregularly irregular

## 2022-12-06 NOTE — REASON FOR VISIT
[Follow-Up] : a follow-up visit [FreeTextEntry1] : Follow up for Type II DM, proteinuria CKD, iron deficiency anemia.

## 2022-12-07 LAB
ALBUMIN SERPL ELPH-MCNC: 4.2 G/DL
ALP BLD-CCNC: 74 U/L
ALT SERPL-CCNC: 9 U/L
ANION GAP SERPL CALC-SCNC: 11 MMOL/L
AST SERPL-CCNC: 13 U/L
BASOPHILS # BLD AUTO: 0.08 K/UL
BASOPHILS NFR BLD AUTO: 1.3 %
BILIRUB SERPL-MCNC: 0.2 MG/DL
BUN SERPL-MCNC: 41 MG/DL
CALCIUM SERPL-MCNC: 9.5 MG/DL
CHLORIDE SERPL-SCNC: 108 MMOL/L
CHOLEST SERPL-MCNC: 165 MG/DL
CO2 SERPL-SCNC: 22 MMOL/L
CREAT SERPL-MCNC: 1.44 MG/DL
CREAT SPEC-SCNC: 83 MG/DL
CREAT/PROT UR: 0.7 RATIO
EGFR: 37 ML/MIN/1.73M2
EOSINOPHIL # BLD AUTO: 0.12 K/UL
EOSINOPHIL NFR BLD AUTO: 1.9 %
ESTIMATED AVERAGE GLUCOSE: 131 MG/DL
GLUCOSE SERPL-MCNC: 62 MG/DL
HBA1C MFR BLD HPLC: 6.2 %
HCT VFR BLD CALC: 28.6 %
HDLC SERPL-MCNC: 56 MG/DL
HGB BLD-MCNC: 8.9 G/DL
IMM GRANULOCYTES NFR BLD AUTO: 0.3 %
IRON SATN MFR SERPL: 14 %
IRON SERPL-MCNC: 41 UG/DL
LDLC SERPL CALC-MCNC: 74 MG/DL
LYMPHOCYTES # BLD AUTO: 1.28 K/UL
LYMPHOCYTES NFR BLD AUTO: 20.5 %
MAN DIFF?: NORMAL
MCHC RBC-ENTMCNC: 29.5 PG
MCHC RBC-ENTMCNC: 31.1 GM/DL
MCV RBC AUTO: 94.7 FL
MONOCYTES # BLD AUTO: 0.74 K/UL
MONOCYTES NFR BLD AUTO: 11.9 %
NEUTROPHILS # BLD AUTO: 4 K/UL
NEUTROPHILS NFR BLD AUTO: 64.1 %
NONHDLC SERPL-MCNC: 109 MG/DL
PLATELET # BLD AUTO: 334 K/UL
POTASSIUM SERPL-SCNC: 5.5 MMOL/L
PROT SERPL-MCNC: 6.5 G/DL
PROT UR-MCNC: 57 MG/DL
RBC # BLD: 3.02 M/UL
RBC # FLD: 12.6 %
SODIUM SERPL-SCNC: 141 MMOL/L
TIBC SERPL-MCNC: 299 UG/DL
TRIGL SERPL-MCNC: 175 MG/DL
UIBC SERPL-MCNC: 258 UG/DL
WBC # FLD AUTO: 6.24 K/UL

## 2022-12-09 LAB
APPEARANCE: CLEAR
BACTERIA: NEGATIVE
BILIRUBIN URINE: NEGATIVE
BLOOD URINE: NEGATIVE
COLOR: NORMAL
GLUCOSE QUALITATIVE U: NEGATIVE
HYALINE CASTS: 1 /LPF
KETONES URINE: NEGATIVE
LEUKOCYTE ESTERASE URINE: ABNORMAL
MICROSCOPIC-UA: NORMAL
NITRITE URINE: NEGATIVE
PH URINE: 6
PROTEIN URINE: ABNORMAL
RED BLOOD CELLS URINE: 2 /HPF
SPECIFIC GRAVITY URINE: 1.02
SQUAMOUS EPITHELIAL CELLS: 1 /HPF
UROBILINOGEN URINE: NORMAL
WHITE BLOOD CELLS URINE: 7 /HPF

## 2023-01-05 ENCOUNTER — RX RENEWAL (OUTPATIENT)
Age: 83
End: 2023-01-05

## 2023-01-12 ENCOUNTER — NON-APPOINTMENT (OUTPATIENT)
Age: 83
End: 2023-01-12

## 2023-01-26 ENCOUNTER — APPOINTMENT (OUTPATIENT)
Dept: GERIATRICS | Facility: CLINIC | Age: 83
End: 2023-01-26
Payer: MEDICARE

## 2023-01-26 VITALS
HEIGHT: 59 IN | RESPIRATION RATE: 16 BRPM | TEMPERATURE: 97.7 F | HEART RATE: 68 BPM | DIASTOLIC BLOOD PRESSURE: 65 MMHG | OXYGEN SATURATION: 97 % | WEIGHT: 191.13 LBS | BODY MASS INDEX: 38.53 KG/M2 | SYSTOLIC BLOOD PRESSURE: 171 MMHG

## 2023-01-26 VITALS — SYSTOLIC BLOOD PRESSURE: 156 MMHG | DIASTOLIC BLOOD PRESSURE: 60 MMHG

## 2023-01-26 PROCEDURE — 99214 OFFICE O/P EST MOD 30 MIN: CPT | Mod: GC

## 2023-01-26 NOTE — PHYSICAL EXAM
[Alert] : alert [No Acute Distress] : in no acute distress [Normal Outer Ear/Nose] : the ears and nose were normal in appearance [Normal Appearance] : the appearance of the neck was normal [Supple] : the neck was supple [No Respiratory Distress] : no respiratory distress [No Acc Muscle Use] : no accessory muscle use [Respiration, Rhythm And Depth] : normal respiratory rhythm and effort [Auscultation Breath Sounds / Voice Sounds] : lungs were clear to auscultation bilaterally [Heart Rate And Rhythm] : heart rate was normal and rhythm regular [Bowel Sounds] : normal bowel sounds [Abdomen Tenderness] : non-tender [Abdomen Soft] : soft [No Spinal Tenderness] : no spinal tenderness [Normal Color / Pigmentation] : normal skin color and pigmentation [Normal Turgor] : normal skin turgor [No Focal Deficits] : no focal deficits [Normal Affect] : the affect was normal [Normal Mood] : the mood was normal [de-identified] : 1+ pitting edema in the b/l LE's symmetrically extending up the distal 1/3 shins

## 2023-01-26 NOTE — HISTORY OF PRESENT ILLNESS
[FreeTextEntry1] : 83 y/o F with PMHx of CKD, Afib, DM type 2, chronic pain presenting to the clinic for follow up visit. Last seen with us on October 2022.\par \par CKD:\par Taking sodium bicarb supplements. Following with neprhology. Last seen in December 2022 with Dr. Jason. Labs were done. Considering IV iron for iron deficiency anemia pending labs. Unable to tolerate oral iron supplements. Upcoming appointment in February 2023. \par \par Edema:\par Experiencing b/l LE edema up to the mid shins. No pain, gait disturbance noted. Not taking Bumex because it causes her increased urinary frequency. Causes sleep disturbance due to getting up to urinate so frequently. Edema worsens through out the day. Raises legs through out the day. Not on strict low salt diet. K 5.5.\par \par HTN:\par On amlodipine and losartan. No HA, vision changes, chest pain, palpitations, focal neuro signs.\par \par Allergies/nasal congestion:\par Takes Zyrtec as needed but feels it is not helping her anymore and her nasal congestion and runny nose occurs more frequently.\par \par Afib:\par Well controlled, on Eliquis. Following with cardiology.\par \par Iron deficiency anemia:\par Feels weak intermittently, no dizziness or falls noted. Hgb from Dec 2022 is 8.9. Has not seen hematologist recently.\par \par Back pain:\par Has near constant lower back pain requiring hydrocodone-aceto 7.5-300 mg, has 2 pills left. Asking for refills. Takes 1-2 pills a day. Pain worsened by ambulation, sitting for prolonged period of time. Has had trials of PT but has not really helped her.\par \par Colon cancer:\par Has not followed up with GI for routine surveillance.\par \par HLD:\par Lipid panel with elevated 175 only. Stable on statin.\par \par DM type 2:\par Does not check blood glucose at home. On glimiperide 2 mg, metformin 500 mg BID. No polyuria, polyphagia, polydipsia. Seeing eye doctor annually. No peripheral neuropathy.

## 2023-03-01 ENCOUNTER — APPOINTMENT (OUTPATIENT)
Dept: CARDIOLOGY | Facility: CLINIC | Age: 83
End: 2023-03-01
Payer: MEDICARE

## 2023-03-01 ENCOUNTER — NON-APPOINTMENT (OUTPATIENT)
Age: 83
End: 2023-03-01

## 2023-03-01 VITALS — BODY MASS INDEX: 39.18 KG/M2 | WEIGHT: 194 LBS

## 2023-03-01 VITALS
OXYGEN SATURATION: 97 % | SYSTOLIC BLOOD PRESSURE: 159 MMHG | HEART RATE: 58 BPM | DIASTOLIC BLOOD PRESSURE: 75 MMHG | HEIGHT: 59 IN | BODY MASS INDEX: 39.18 KG/M2

## 2023-03-01 PROCEDURE — 99214 OFFICE O/P EST MOD 30 MIN: CPT

## 2023-03-01 PROCEDURE — 93000 ELECTROCARDIOGRAM COMPLETE: CPT

## 2023-03-01 NOTE — PHYSICAL EXAM
[Well Developed] : well developed [Well Nourished] : well nourished [No Acute Distress] : no acute distress [Obese] : obese [Normal Conjunctiva] : normal conjunctiva [Normal Venous Pressure] : normal venous pressure [No Carotid Bruit] : no carotid bruit [No Murmur] : no murmur [No Rub] : no rub [No Gallop] : no gallop [Clear Lung Fields] : clear lung fields [Good Air Entry] : good air entry [No Respiratory Distress] : no respiratory distress  [Soft] : abdomen soft [Non Tender] : non-tender [Normal Gait] : normal gait [No Cyanosis] : no cyanosis [No Rash] : no rash [No Skin Lesions] : no skin lesions [Moves all extremities] : moves all extremities [No Focal Deficits] : no focal deficits [Normal Speech] : normal speech [Alert and Oriented] : alert and oriented [de-identified] : irregular [de-identified] : bilateral LE 1+ pitting edema R>L

## 2023-03-01 NOTE — DISCUSSION/SUMMARY
[Paroxysmal Atrial Fibrillation] : paroxysmal atrial fibrillation [Stable] : stable [Compensated] : compensated [Hypertension] : hypertension [Low Sodium Diet] : low sodium diet [FreeTextEntry1] : \par Currently stable from a cardiovascular standpoint. Hypertensive today. Patient with likely dependent edema. History of paroxysmal atrial fibrillation ([id number] score 4). Currently in sinus rhythm with sinus arrhythmia. Most recent labs reviewed. Patient with CKD stage IIIb (creatinine 1.44, eGFR 37). No ischemic or CHF symptoms. Patient with chronic back pains. Continue current medications including apixaban and metoprolol. ECG completed today and reviewed (findings as noted above). Follow up in 6 months. [EKG obtained to assist in diagnosis and management of assessed problem(s)] : EKG obtained to assist in diagnosis and management of assessed problem(s)

## 2023-03-01 NOTE — HISTORY OF PRESENT ILLNESS
[FreeTextEntry1] : Doing okay. Denies chest pain or shortness of breath. Had an episode of feeling skipped heart beats one evening which resolved after a brief period.

## 2023-03-01 NOTE — CARDIOLOGY SUMMARY
[de-identified] : \par 03/01/23 - sinus rhythm with sinus arrhythmia, poor R-wave progression\par  [de-identified] : \par 08/30/19 (exercise MIBI) - 5 METS< 90% MPHR, 03:38 min, no ECG abnormalities, Duke score 3, no evidence for myocardial infarction or ischemia, LVEF 68%\par  [de-identified] : \par 06/20/21 - MAC, mild MR, mild MS (mean MV gradient 3 mmHg), normal LA, grossly normal LV systolic function, concentric LV remodeling, normal RV size and function, RVSP 29 mmHg, LVEF 60%\par

## 2023-03-01 NOTE — REVIEW OF SYSTEMS
[Negative] : Musculoskeletal [SOB] : no shortness of breath [Dyspnea on exertion] : not dyspnea during exertion [Chest Discomfort] : no chest discomfort [Lower Ext Edema] : lower extremity edema [Leg Claudication] : no intermittent leg claudication [Palpitations] : palpitations [Orthopnea] : no orthopnea [PND] : no PND [Syncope] : no syncope

## 2023-03-13 ENCOUNTER — APPOINTMENT (OUTPATIENT)
Dept: NEPHROLOGY | Facility: CLINIC | Age: 83
End: 2023-03-13
Payer: MEDICARE

## 2023-03-13 VITALS
HEART RATE: 67 BPM | WEIGHT: 196 LBS | SYSTOLIC BLOOD PRESSURE: 165 MMHG | BODY MASS INDEX: 39.51 KG/M2 | HEIGHT: 59 IN | TEMPERATURE: 97.4 F | OXYGEN SATURATION: 97 % | DIASTOLIC BLOOD PRESSURE: 60 MMHG

## 2023-03-13 VITALS
RESPIRATION RATE: 16 BRPM | OXYGEN SATURATION: 97 % | HEIGHT: 59 IN | WEIGHT: 196 LBS | SYSTOLIC BLOOD PRESSURE: 152 MMHG | BODY MASS INDEX: 39.51 KG/M2 | DIASTOLIC BLOOD PRESSURE: 60 MMHG | HEART RATE: 66 BPM

## 2023-03-13 PROCEDURE — 99213 OFFICE O/P EST LOW 20 MIN: CPT

## 2023-03-13 RX ORDER — POLYETHYLENE GLYCOL-3350 AND ELECTROLYTES 236; 6.74; 5.86; 2.97; 22.74 G/274.31G; G/274.31G; G/274.31G; G/274.31G; G/274.31G
236 POWDER, FOR SOLUTION ORAL
Qty: 1 | Refills: 0 | Status: DISCONTINUED | COMMUNITY
Start: 2022-09-06 | End: 2023-03-13

## 2023-03-13 NOTE — HISTORY OF PRESENT ILLNESS
[FreeTextEntry1] : The patient continues to struggle w/ shoulder and neck pain.  She is scheduled for shoulder injection in one week. Mrs. Simmons has not yet follow up w/ GI regarding upper and lower endoscopy for iron deficiency.  Her other issue is the difficult relationship she has w/ her daughter who is still living w/ her.  \par She has seen Guy Bauer and Dr. Caceres. Bilateral leg edema and high systolic BP were found. The patient states that she has run out of Bumetanide and is no longer taking sodium bicarbonate. \par She is here to have her blood pressure and volume status assessed and for follow up labs including CBC and iron studies.

## 2023-03-13 NOTE — REVIEW OF SYSTEMS
[Feeling Tired] : feeling tired [Chest Pain] : no chest pain [Palpitations] : palpitations [SOB on Exertion] : shortness of breath during exertion [Anxiety] : anxiety [As Noted in HPI] : as noted in HPI [Negative] : Neurological [FreeTextEntry5] : One episode of palpitation that resolved spontaneously [FreeTextEntry9] : Chronic low back pain and shoulder pain.

## 2023-03-13 NOTE — ASSESSMENT
[FreeTextEntry1] : 1. CKD stage IV. Will repeat labs today to assess rate of progression. \par 2. Type II DM, Check A1c.\par 3. Hypertension: Systolic hypertension with significant edema. Weigh is up to 196 lb. The patient's bumetanide was reordered, the patient asked to check her weight 4 time a week and to expect a 7 to 10 lb weigh loss. \par 4. Anemia related to CKD and iron deficiency. Will check iron and TIBC. May require IV Iron as she is not able to tolerate oral Iron. \par 5. Atrial fibrillation. Rate controlled. Continue Eliquis.\par Will check labs and call the patient tomorrow \par Return in 3 months.\par

## 2023-03-13 NOTE — PHYSICAL EXAM
[General Appearance - Alert] : alert [Sclera] : the sclera and conjunctiva were normal [Hearing Threshold Finger Rub Not [place]] : hearing was normal [Jugular Venous Distention Increased] : there was no jugular-venous distention [Auscultation Breath Sounds / Voice Sounds] : lungs were clear to auscultation bilaterally [Normal] : the heart rate was normal [Irregularly Irregular] : the rhythm was irregularly irregular [Systolic grade ___/6] : A grade [unfilled]/6 systolic murmur was heard. [___ +] : bilateral [unfilled]+ pitting edema to the ankles [Abdomen Tenderness] : non-tender [Urinary Bladder Findings] : the bladder was normal on palpation [No CVA Tenderness] : no ~M costovertebral angle tenderness [Abnormal Walk] : normal gait [] : no rash [No Focal Deficits] : no focal deficits [Oriented To Time, Place, And Person] : oriented to person, place, and time

## 2023-03-15 ENCOUNTER — APPOINTMENT (OUTPATIENT)
Dept: ORTHOPEDIC SURGERY | Facility: CLINIC | Age: 83
End: 2023-03-15
Payer: MEDICARE

## 2023-03-15 DIAGNOSIS — M67.911 UNSPECIFIED DISORDER OF SYNOVIUM AND TENDON, RIGHT SHOULDER: ICD-10-CM

## 2023-03-15 LAB
ALBUMIN SERPL ELPH-MCNC: 4.1 G/DL
ALP BLD-CCNC: 78 U/L
ALT SERPL-CCNC: 6 U/L
ANION GAP SERPL CALC-SCNC: 11 MMOL/L
AST SERPL-CCNC: 11 U/L
BASOPHILS # BLD AUTO: 0.08 K/UL
BASOPHILS NFR BLD AUTO: 1.3 %
BILIRUB SERPL-MCNC: 0.2 MG/DL
BUN SERPL-MCNC: 31 MG/DL
CALCIUM SERPL-MCNC: 9.9 MG/DL
CHLORIDE SERPL-SCNC: 105 MMOL/L
CHOLEST SERPL-MCNC: 170 MG/DL
CO2 SERPL-SCNC: 23 MMOL/L
CREAT SERPL-MCNC: 1.39 MG/DL
EGFR: 38 ML/MIN/1.73M2
EOSINOPHIL # BLD AUTO: 0.1 K/UL
EOSINOPHIL NFR BLD AUTO: 1.6 %
ESTIMATED AVERAGE GLUCOSE: 140 MG/DL
GLUCOSE SERPL-MCNC: 121 MG/DL
HBA1C MFR BLD HPLC: 6.5 %
HCT VFR BLD CALC: 28.7 %
HDLC SERPL-MCNC: 66 MG/DL
HGB BLD-MCNC: 9 G/DL
IMM GRANULOCYTES NFR BLD AUTO: 0.2 %
IRON SATN MFR SERPL: 18 %
IRON SERPL-MCNC: 54 UG/DL
LDLC SERPL CALC-MCNC: 80 MG/DL
LYMPHOCYTES # BLD AUTO: 0.83 K/UL
LYMPHOCYTES NFR BLD AUTO: 13.5 %
MAN DIFF?: NORMAL
MCHC RBC-ENTMCNC: 29.9 PG
MCHC RBC-ENTMCNC: 31.4 GM/DL
MCV RBC AUTO: 95.3 FL
MONOCYTES # BLD AUTO: 0.55 K/UL
MONOCYTES NFR BLD AUTO: 8.9 %
NEUTROPHILS # BLD AUTO: 4.59 K/UL
NEUTROPHILS NFR BLD AUTO: 74.5 %
NONHDLC SERPL-MCNC: 104 MG/DL
PLATELET # BLD AUTO: 286 K/UL
POTASSIUM SERPL-SCNC: 5.8 MMOL/L
PROT SERPL-MCNC: 6.4 G/DL
RBC # BLD: 3.01 M/UL
RBC # FLD: 12.2 %
SODIUM SERPL-SCNC: 139 MMOL/L
TIBC SERPL-MCNC: 299 UG/DL
TRIGL SERPL-MCNC: 120 MG/DL
UIBC SERPL-MCNC: 245 UG/DL
WBC # FLD AUTO: 6.16 K/UL

## 2023-03-15 PROCEDURE — 20610 DRAIN/INJ JOINT/BURSA W/O US: CPT | Mod: RT

## 2023-03-15 PROCEDURE — 99213 OFFICE O/P EST LOW 20 MIN: CPT | Mod: 25

## 2023-03-16 NOTE — HISTORY OF PRESENT ILLNESS
[de-identified] : 81 y/o RHD female who is retired  who was seen by Dr. Velasquez and was found to have mild degenerative changes and partial cuff tear on MRI presents with continued right shoulder pain. She states the pain is affecting her daily ADL's. She did try PT which did not help at all and has stopped. She would like to have a cortisone injection since it did help last time by Dr. Velasquez. The pain is worse at NIGHT. She is unable to have surgery due to having medical comorbidities.  \par \par Hx: DM medication controlled. HTN, Hyperlipidemia. \par Developed Afib on Eliquis due to Steroid tabs.

## 2023-03-16 NOTE — PHYSICAL EXAM
[LE] : Sensory: Intact in bilateral lower extremities [DP] : dorsalis pedis 2+ and symmetric bilaterally [PT] : posterior tibial 2+ and symmetric bilaterally [de-identified] : Pt is pleasant 83 yo female, alert and oriented x3 with no apparent distress, increased BMI.  Physical examination of the right shoulder reveals normal contours with no deformity, skin intact, with no signs of infection, no erythema, no swelling, no discoloration, no distal lymphedema, no patholaxity, no muscle atrophy noted. ROM of the right shoulder reveals , ER 30, IR L3. Strength is 3/5 within this arc of motion. No neurological deficits. + Jobes test\par \par \par

## 2023-03-16 NOTE — DISCUSSION/SUMMARY
[de-identified] : 82 year old female with right shoulder pain secondary to degenerative changes in the shoulder, partial biceps tear, rotator cuff tendinopathy, synovitis. She received a cortisone injection today which she tolerated well. She was advsied that due to her diabetes her sugars will be high for the next few weeks. She is interested in having a cortisone injection in the left shoulder and she should follow up in 2 weeks for an injection. The patient expressed understanding and all questions were answered.\par \par

## 2023-03-16 NOTE — PROCEDURE
[de-identified] : After careful discussion regarding the risks versus benefits of a corticosteroid injection the patient has elected to proceed with that treatment. Under sterile conditions, the patient received  a right shoulder injection into the glenohumeral joint and subacromial space with 8 cc of half percent Marcaine without epinephrine and 2 cc of Betamethasone. The site was cleaned and a bandaid was applied. The patient tolerated the injection without problem.\par \par

## 2023-03-16 NOTE — CONSULT LETTER
[Dear  ___] : Dear  [unfilled], [Please see my note below.] : Please see my note below. [FreeTextEntry1] : I had the pleasure of evaluating your patient in the office today for complaints of right shoulder pain secondary to partial biceps tear, rotator cuff tendinopathy, and synovitis. I have enclosed a copy of today's office notes for your charts and for your review.\par \par Sincerely, \par \par Arvin Cavanaugh MS PA-C\par [facility]\par Department of Orthopedic Surgery\par [facility] [place]

## 2023-03-23 ENCOUNTER — APPOINTMENT (OUTPATIENT)
Dept: GERIATRICS | Facility: CLINIC | Age: 83
End: 2023-03-23

## 2023-03-29 ENCOUNTER — APPOINTMENT (OUTPATIENT)
Dept: ORTHOPEDIC SURGERY | Facility: CLINIC | Age: 83
End: 2023-03-29
Payer: MEDICARE

## 2023-03-29 VITALS
HEIGHT: 59 IN | WEIGHT: 196 LBS | BODY MASS INDEX: 39.51 KG/M2 | TEMPERATURE: 96.4 F | OXYGEN SATURATION: 98 % | HEART RATE: 79 BPM | SYSTOLIC BLOOD PRESSURE: 135 MMHG | DIASTOLIC BLOOD PRESSURE: 65 MMHG

## 2023-03-29 DIAGNOSIS — M67.912 UNSPECIFIED DISORDER OF SYNOVIUM AND TENDON, LEFT SHOULDER: ICD-10-CM

## 2023-03-29 PROCEDURE — 99213 OFFICE O/P EST LOW 20 MIN: CPT | Mod: 25

## 2023-03-29 PROCEDURE — 20610 DRAIN/INJ JOINT/BURSA W/O US: CPT | Mod: LT

## 2023-03-29 NOTE — PROCEDURE
[de-identified] : After careful discussion regarding the risks versus benefits of a corticosteroid injection the patient has elected to proceed with that treatment. Under sterile conditions, the patient received  a left  shoulder injection into the glenohumeral joint and subacromial space with 8 cc of half percent Marcaine without epinephrine and 2 cc of Betamethasone. The site was cleaned and a bandaid was applied. The patient tolerated the injection without problem.\par \par

## 2023-03-29 NOTE — CONSULT LETTER
[Dear  ___] : Dear  [unfilled], [Please see my note below.] : Please see my note below. [FreeTextEntry1] : I had the pleasure of evaluating your patient in the office today for complaints of left shoulder pain secondary to rotator cuff tear. I have enclosed a copy of today's office notes for your charts and for your review.\par \par Sincerely, \par \par Arvin Cavanaugh, MS MURGUIA\par [facility]\par Department of Orthopedic Surgery\par [facility] \par

## 2023-03-29 NOTE — DISCUSSION/SUMMARY
[de-identified] : Pt is a pleasant 82 year old female, with left shoulder pain secondary to rotator cuff tear. A lengthy discussion was held regarding the patient’s condition and treatment options including all risks, benefits, prognosis and outcomes of each were discussed in detail. The patient would like to continue with conservative management at this time and was advised on the use of Tylenol for pain control, icing, activity modification, and possible cortisone injections. The patient has opted for a cortisone injection which she tolerated without any complications. She was advised to monitor her sugar levels due to the cortisone injection. The patient will contact me if there are any concerns otherwise follow up will be on a prn basis. The patient express understanding and all questions were answered.\par

## 2023-03-29 NOTE — PHYSICAL EXAM
[UE] : Sensory: Intact in bilateral upper extremities [B.R.] : biceps 2+ and symmetric bilaterally [de-identified] : Pt is a pleasant 82 year old female, AAOx3 with no apparent distress, 36 BMI.  Physical examination of both shoulders reveals normal contours with no deformity, skin intact, with no signs of infection, no erythema, no swelling, no discoloration, no distal lymphedema, no patholaxity, no muscle atrophy noted. ROM of let shoulder reveals forward flexion to 85°,  external rotation 28°,  IR to L3. Motor strength 4/5 in ER, IR, and 3/5 FF in the scapular plane. No neurological deficits noted.\par

## 2023-04-07 LAB
ALBUMIN SERPL ELPH-MCNC: 4.2 G/DL
ANION GAP SERPL CALC-SCNC: 14 MMOL/L
BUN SERPL-MCNC: 68 MG/DL
CALCIUM SERPL-MCNC: 9.4 MG/DL
CHLORIDE SERPL-SCNC: 107 MMOL/L
CO2 SERPL-SCNC: 18 MMOL/L
CREAT SERPL-MCNC: 1.84 MG/DL
EGFR: 27 ML/MIN/1.73M2
GLUCOSE SERPL-MCNC: 98 MG/DL
PHOSPHATE SERPL-MCNC: 4 MG/DL
POTASSIUM SERPL-SCNC: 5.4 MMOL/L
SODIUM SERPL-SCNC: 139 MMOL/L

## 2023-05-06 ENCOUNTER — NON-APPOINTMENT (OUTPATIENT)
Age: 83
End: 2023-05-06

## 2023-05-15 ENCOUNTER — NON-APPOINTMENT (OUTPATIENT)
Age: 83
End: 2023-05-15

## 2023-05-15 ENCOUNTER — APPOINTMENT (OUTPATIENT)
Dept: GERIATRICS | Facility: CLINIC | Age: 83
End: 2023-05-15
Payer: MEDICARE

## 2023-05-15 VITALS
HEART RATE: 67 BPM | HEIGHT: 59 IN | DIASTOLIC BLOOD PRESSURE: 73 MMHG | SYSTOLIC BLOOD PRESSURE: 146 MMHG | RESPIRATION RATE: 16 BRPM | BODY MASS INDEX: 38.96 KG/M2 | WEIGHT: 193.25 LBS | OXYGEN SATURATION: 99 % | TEMPERATURE: 97.6 F

## 2023-05-15 PROCEDURE — 99214 OFFICE O/P EST MOD 30 MIN: CPT

## 2023-05-15 NOTE — PHYSICAL EXAM
[Normal] : no focal deficits [Oriented To Time, Place, And Person] : oriented to person, place, and time [de-identified] : 1-2+ bilateral lower extremity edema below the knees
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
## 2023-05-15 NOTE — REVIEW OF SYSTEMS
[Fever] : no fever [Chills] : no chills [Lower Ext Edema] : lower extremity edema [As Noted in HPI] : as noted in HPI [Negative] : Heme/Lymph

## 2023-05-16 LAB
ALBUMIN SERPL ELPH-MCNC: 4.4 G/DL
ALP BLD-CCNC: 84 U/L
ALT SERPL-CCNC: 7 U/L
ANION GAP SERPL CALC-SCNC: 16 MMOL/L
AST SERPL-CCNC: 13 U/L
BILIRUB SERPL-MCNC: 0.3 MG/DL
BUN SERPL-MCNC: 55 MG/DL
CALCIUM SERPL-MCNC: 9.7 MG/DL
CHLORIDE SERPL-SCNC: 103 MMOL/L
CO2 SERPL-SCNC: 22 MMOL/L
CREAT SERPL-MCNC: 1.52 MG/DL
EGFR: 34 ML/MIN/1.73M2
ESTIMATED AVERAGE GLUCOSE: 143 MG/DL
GLUCOSE SERPL-MCNC: 140 MG/DL
HBA1C MFR BLD HPLC: 6.6 %
HCT VFR BLD CALC: 30.1 %
HGB BLD-MCNC: 9.5 G/DL
MCHC RBC-ENTMCNC: 30 PG
MCHC RBC-ENTMCNC: 31.6 GM/DL
MCV RBC AUTO: 95 FL
PLATELET # BLD AUTO: 321 K/UL
POTASSIUM SERPL-SCNC: 5.4 MMOL/L
PROT SERPL-MCNC: 6.4 G/DL
RBC # BLD: 3.17 M/UL
RBC # FLD: 12.4 %
SODIUM SERPL-SCNC: 141 MMOL/L
WBC # FLD AUTO: 5.99 K/UL

## 2023-06-06 ENCOUNTER — APPOINTMENT (OUTPATIENT)
Dept: NEPHROLOGY | Facility: CLINIC | Age: 83
End: 2023-06-06
Payer: MEDICARE

## 2023-06-06 VITALS
OXYGEN SATURATION: 96 % | WEIGHT: 195 LBS | TEMPERATURE: 97.2 F | BODY MASS INDEX: 39.31 KG/M2 | SYSTOLIC BLOOD PRESSURE: 148 MMHG | HEIGHT: 59 IN | HEART RATE: 62 BPM | DIASTOLIC BLOOD PRESSURE: 69 MMHG

## 2023-06-06 PROCEDURE — 99213 OFFICE O/P EST LOW 20 MIN: CPT

## 2023-06-06 NOTE — PHYSICAL EXAM
[General Appearance - Alert] : alert [General Appearance - In No Acute Distress] : in no acute distress [Jugular Venous Distention Increased] : there was no jugular-venous distention [Auscultation Breath Sounds / Voice Sounds] : lungs were clear to auscultation bilaterally [Irregularly Irregular] : the rhythm was irregularly irregular [___ +] : bilateral [unfilled]+ pitting edema to the ankles [Abdomen Tenderness] : non-tender [Urinary Bladder Findings] : the bladder was normal on palpation [No Spinal Tenderness] : no spinal tenderness [Abnormal Walk] : normal gait [] : no rash [No Focal Deficits] : no focal deficits [Oriented To Time, Place, And Person] : oriented to person, place, and time

## 2023-06-06 NOTE — REASON FOR VISIT
[Follow-Up] : a follow-up visit [FreeTextEntry1] : CKD 3b, hypertension, type II DM and tendency to hyperkalemia.

## 2023-06-06 NOTE — REVIEW OF SYSTEMS
[Fever] : no fever [Chills] : no chills [Chest Pain] : no chest pain [Palpitations] : no palpitations [SOB on Exertion] : shortness of breath during exertion [Arthralgias] : arthralgias [Joint Stiffness] : joint stiffness [Anxiety] : anxiety [Depression] : depression [Negative] : Neurological

## 2023-06-06 NOTE — HISTORY OF PRESENT ILLNESS
[FreeTextEntry1] : She was seen by Dr. Bauer mid May. Labs done then showed a stable renal fucntion with a creatinine of 1.52. Serum K was 5.4 in the setting of mild hemolysis. CBC showed a hemoglobin of 9.5, stable. \par The major issue remains the difficult home situation w/ her daughter, which is causing significant amount of stress. No other issue.

## 2023-06-06 NOTE — ASSESSMENT
[FreeTextEntry1] : 1. CKD III stable.\par 2. Anemia of CKD. Most recent Hematocrit is 30.1. Discussed that her Hct is not so low that ANUP is indicated, especially because of the risk of hyperkalemia. She is not willing to take Lokelma.\par 3. Type II DM, controlled. \par 4. Hyperkalemia, last K was 5.4 in the setting of mild hemolysis.\par 5. Hypertension: systolic BP elevated. In the setting of atrial fibrillation. Diastolic low normal. No changes in meds.\par Follow up in September w/ Dr. Bauer and w/ me in October.

## 2023-07-06 NOTE — PATIENT PROFILE ADULT - NSPRESCRUSEDDRG_GEN_A_NUR
Received fax from pharmacy requesting refill(s) for     baclofen (LIORESAL) 10 MG tablet    Date last filled 06/05/2023    Last Appt Date:06/14/2023    Next Appt scheduled: none    Pharmacy: [facility]/pharmacy #[id number] - [place], [place] - [address] route for processing    Tasha Mckeon MA  [facility] [place]    No

## 2023-09-07 ENCOUNTER — APPOINTMENT (OUTPATIENT)
Dept: CARDIOLOGY | Facility: CLINIC | Age: 83
End: 2023-09-07
Payer: MEDICARE

## 2023-09-07 ENCOUNTER — NON-APPOINTMENT (OUTPATIENT)
Age: 83
End: 2023-09-07

## 2023-09-07 VITALS
DIASTOLIC BLOOD PRESSURE: 70 MMHG | BODY MASS INDEX: 40.32 KG/M2 | HEART RATE: 68 BPM | RESPIRATION RATE: 16 BRPM | OXYGEN SATURATION: 98 % | SYSTOLIC BLOOD PRESSURE: 136 MMHG | HEIGHT: 59 IN | WEIGHT: 200 LBS

## 2023-09-07 PROCEDURE — 99214 OFFICE O/P EST MOD 30 MIN: CPT

## 2023-09-07 PROCEDURE — 93000 ELECTROCARDIOGRAM COMPLETE: CPT

## 2023-09-08 LAB
ANION GAP SERPL CALC-SCNC: 12 MMOL/L
BUN SERPL-MCNC: 33 MG/DL
CALCIUM SERPL-MCNC: 9.5 MG/DL
CHLORIDE SERPL-SCNC: 105 MMOL/L
CO2 SERPL-SCNC: 22 MMOL/L
CREAT SERPL-MCNC: 1.44 MG/DL
EGFR: 37 ML/MIN/1.73M2
GLUCOSE SERPL-MCNC: 117 MG/DL
POTASSIUM SERPL-SCNC: 5.2 MMOL/L
SODIUM SERPL-SCNC: 139 MMOL/L

## 2023-09-08 NOTE — CARDIOLOGY SUMMARY
[de-identified] : 09/07/23 - sinus rhythm with short PA, occasional PAC, poor R-wave progression  [de-identified] : \par  08/30/19 (exercise MIBI) - 5 METS< 90% MPHR, 03:38 min, no ECG abnormalities, Duke score 3, no evidence for myocardial infarction or ischemia, LVEF 68%\par   [de-identified] : \par  06/20/21 - MAC, mild MR, mild MS (mean MV gradient 3 mmHg), normal LA, grossly normal LV systolic function, concentric LV remodeling, normal RV size and function, RVSP 29 mmHg, LVEF 60%\par

## 2023-09-08 NOTE — PHYSICAL EXAM
[Well Developed] : well developed [Well Nourished] : well nourished [No Acute Distress] : no acute distress [Obese] : obese [Normal Conjunctiva] : normal conjunctiva [Normal Venous Pressure] : normal venous pressure [No Carotid Bruit] : no carotid bruit [No Murmur] : no murmur [No Rub] : no rub [No Gallop] : no gallop [Clear Lung Fields] : clear lung fields [Good Air Entry] : good air entry [No Respiratory Distress] : no respiratory distress  [Soft] : abdomen soft [Non Tender] : non-tender [Normal Gait] : normal gait [No Cyanosis] : no cyanosis [No Rash] : no rash [No Skin Lesions] : no skin lesions [Moves all extremities] : moves all extremities [No Focal Deficits] : no focal deficits [Normal Speech] : normal speech [Alert and Oriented] : alert and oriented [de-identified] : irregular [de-identified] : bilateral LE 1+ pitting edema R>L

## 2023-09-08 NOTE — HISTORY OF PRESENT ILLNESS
[FreeTextEntry1] : Currently doing okay. Occasional SOB on exertion. Denies chest pain or palpitations. Has some leg swelling.

## 2023-09-11 ENCOUNTER — APPOINTMENT (OUTPATIENT)
Dept: GERIATRICS | Facility: CLINIC | Age: 83
End: 2023-09-11

## 2023-09-25 ENCOUNTER — APPOINTMENT (OUTPATIENT)
Dept: GERIATRICS | Facility: CLINIC | Age: 83
End: 2023-09-25

## 2023-10-03 ENCOUNTER — APPOINTMENT (OUTPATIENT)
Dept: NEPHROLOGY | Facility: CLINIC | Age: 83
End: 2023-10-03
Payer: MEDICARE

## 2023-10-03 VITALS
OXYGEN SATURATION: 97 % | SYSTOLIC BLOOD PRESSURE: 127 MMHG | HEART RATE: 85 BPM | HEIGHT: 59 IN | BODY MASS INDEX: 38.71 KG/M2 | WEIGHT: 192 LBS | DIASTOLIC BLOOD PRESSURE: 66 MMHG | TEMPERATURE: 97.6 F

## 2023-10-03 DIAGNOSIS — F41.9 ANXIETY DISORDER, UNSPECIFIED: ICD-10-CM

## 2023-10-03 PROCEDURE — 99213 OFFICE O/P EST LOW 20 MIN: CPT

## 2023-10-19 ENCOUNTER — APPOINTMENT (OUTPATIENT)
Dept: GERIATRICS | Facility: CLINIC | Age: 83
End: 2023-10-19
Payer: MEDICARE

## 2023-10-19 ENCOUNTER — RX CHANGE (OUTPATIENT)
Age: 83
End: 2023-10-19

## 2023-10-19 VITALS
TEMPERATURE: 97.2 F | DIASTOLIC BLOOD PRESSURE: 71 MMHG | BODY MASS INDEX: 38.18 KG/M2 | WEIGHT: 189.38 LBS | OXYGEN SATURATION: 94 % | SYSTOLIC BLOOD PRESSURE: 148 MMHG | HEIGHT: 59 IN | HEART RATE: 74 BPM | RESPIRATION RATE: 16 BRPM

## 2023-10-19 VITALS — SYSTOLIC BLOOD PRESSURE: 136 MMHG | DIASTOLIC BLOOD PRESSURE: 62 MMHG

## 2023-10-19 DIAGNOSIS — Z23 ENCOUNTER FOR IMMUNIZATION: ICD-10-CM

## 2023-10-19 PROCEDURE — G0008: CPT

## 2023-10-19 PROCEDURE — 90662 IIV NO PRSV INCREASED AG IM: CPT

## 2023-10-19 PROCEDURE — 99214 OFFICE O/P EST MOD 30 MIN: CPT | Mod: 25

## 2023-10-19 RX ORDER — ESCITALOPRAM OXALATE 5 MG/1
5 TABLET ORAL DAILY
Qty: 90 | Refills: 1 | Status: DISCONTINUED | COMMUNITY
Start: 2023-05-15 | End: 2023-10-19

## 2023-10-20 LAB
ALBUMIN SERPL ELPH-MCNC: 4.4 G/DL
ALP BLD-CCNC: 86 U/L
ALT SERPL-CCNC: 5 U/L
ANION GAP SERPL CALC-SCNC: 13 MMOL/L
AST SERPL-CCNC: 10 U/L
BASOPHILS # BLD AUTO: 0.11 K/UL
BASOPHILS NFR BLD AUTO: 1.7 %
BILIRUB SERPL-MCNC: 0.3 MG/DL
BUN SERPL-MCNC: 36 MG/DL
CALCIUM SERPL-MCNC: 9.6 MG/DL
CHLORIDE SERPL-SCNC: 106 MMOL/L
CHOLEST SERPL-MCNC: 160 MG/DL
CO2 SERPL-SCNC: 20 MMOL/L
CREAT SERPL-MCNC: 1.37 MG/DL
CREAT SPEC-SCNC: 170 MG/DL
EGFR: 39 ML/MIN/1.73M2
EOSINOPHIL # BLD AUTO: 0.14 K/UL
EOSINOPHIL NFR BLD AUTO: 2.1 %
ESTIMATED AVERAGE GLUCOSE: 146 MG/DL
FOLATE SERPL-MCNC: 10.8 NG/ML
GLUCOSE SERPL-MCNC: 217 MG/DL
HBA1C MFR BLD HPLC: 6.7 %
HCT VFR BLD CALC: 28.3 %
HDLC SERPL-MCNC: 55 MG/DL
HGB BLD-MCNC: 9 G/DL
IMM GRANULOCYTES NFR BLD AUTO: 0.3 %
IRON SATN MFR SERPL: 20 %
IRON SERPL-MCNC: 60 UG/DL
LDLC SERPL CALC-MCNC: 74 MG/DL
LYMPHOCYTES # BLD AUTO: 1.04 K/UL
LYMPHOCYTES NFR BLD AUTO: 15.8 %
MAN DIFF?: NORMAL
MCHC RBC-ENTMCNC: 29.1 PG
MCHC RBC-ENTMCNC: 31.8 GM/DL
MCV RBC AUTO: 91.6 FL
MICROALBUMIN 24H UR DL<=1MG/L-MCNC: 79.2 MG/DL
MICROALBUMIN/CREAT 24H UR-RTO: 465 MG/G
MONOCYTES # BLD AUTO: 0.55 K/UL
MONOCYTES NFR BLD AUTO: 8.4 %
NEUTROPHILS # BLD AUTO: 4.72 K/UL
NEUTROPHILS NFR BLD AUTO: 71.7 %
NONHDLC SERPL-MCNC: 105 MG/DL
PLATELET # BLD AUTO: 311 K/UL
POTASSIUM SERPL-SCNC: 5.2 MMOL/L
PROT SERPL-MCNC: 6.4 G/DL
RBC # BLD: 3.09 M/UL
RBC # FLD: 13 %
SODIUM SERPL-SCNC: 138 MMOL/L
TIBC SERPL-MCNC: 305 UG/DL
TRIGL SERPL-MCNC: 185 MG/DL
UIBC SERPL-MCNC: 245 UG/DL
VIT B12 SERPL-MCNC: 395 PG/ML
WBC # FLD AUTO: 6.58 K/UL

## 2023-11-02 ENCOUNTER — RX RENEWAL (OUTPATIENT)
Age: 83
End: 2023-11-02

## 2023-11-02 RX ORDER — PANTOPRAZOLE 40 MG/1
40 TABLET, DELAYED RELEASE ORAL
Qty: 90 | Refills: 0 | Status: ACTIVE | COMMUNITY
Start: 2019-11-20 | End: 1900-01-01

## 2023-11-09 ENCOUNTER — APPOINTMENT (OUTPATIENT)
Dept: CARDIOLOGY | Facility: CLINIC | Age: 83
End: 2023-11-09

## 2023-12-04 ENCOUNTER — NON-APPOINTMENT (OUTPATIENT)
Age: 83
End: 2023-12-04

## 2023-12-04 ENCOUNTER — RX RENEWAL (OUTPATIENT)
Age: 83
End: 2023-12-04

## 2023-12-15 ENCOUNTER — LABORATORY RESULT (OUTPATIENT)
Age: 83
End: 2023-12-15

## 2023-12-15 ENCOUNTER — APPOINTMENT (OUTPATIENT)
Dept: NEPHROLOGY | Facility: CLINIC | Age: 83
End: 2023-12-15
Payer: MEDICARE

## 2023-12-15 VITALS
OXYGEN SATURATION: 98 % | HEART RATE: 99 BPM | BODY MASS INDEX: 37.5 KG/M2 | DIASTOLIC BLOOD PRESSURE: 69 MMHG | TEMPERATURE: 97.6 F | SYSTOLIC BLOOD PRESSURE: 165 MMHG | HEIGHT: 59 IN | WEIGHT: 186 LBS

## 2023-12-15 DIAGNOSIS — M47.816 SPONDYLOSIS W/OUT MYELOPATHY OR RADICULOPATHY, LUMBAR REGION: ICD-10-CM

## 2023-12-15 DIAGNOSIS — R80.9 PROTEINURIA, UNSPECIFIED: ICD-10-CM

## 2023-12-15 PROCEDURE — 99213 OFFICE O/P EST LOW 20 MIN: CPT

## 2023-12-15 NOTE — ASSESSMENT
[FreeTextEntry1] : 1. Systolic hypertension w/ edema.  Discussed that Bumetanide should be taken regularly twice per day to avoid fluid retention when the dose is missed w/ consequent diuresis when the next dose is taken. The patient has good understanding of the above and she will take the medication twice a day as prescribed. 2. Bilateral leg edema. See above. 3. CKD III check labs. 4 Proteinuria: check urine protein. 5 Lumbar spinal disease.  Start Gabapentin 100 mg tid.  Return in February. Will call Monday w/ the test results.

## 2023-12-15 NOTE — REASON FOR VISIT
[Follow-Up] : a follow-up visit [FreeTextEntry1] : Hypertension, Atrial fibrillation, CKD III, type II DM

## 2023-12-15 NOTE — HISTORY OF PRESENT ILLNESS
[FreeTextEntry1] : Since last visit of October 2023: continues to have shoulder and back pain. The back pain is the major reason for limiting her daily activities.  Also complains of urinating too often Further questioning, the patient does not take the Bumex regularly. Sometimes she skips the morning dose. and sometimes she takes two tablets at once. She is no longer seeing the orthopedic.  The purpose of this visit is to assess BP control and volume status and addressed her back pain.

## 2023-12-15 NOTE — PHYSICAL EXAM
[General Appearance - Alert] : alert [General Appearance - In No Acute Distress] : in no acute distress [Sclera] : the sclera and conjunctiva were normal [Hearing Threshold Finger Rub Not [place]] : hearing was normal [Jugular Venous Distention Increased] : there was no jugular-venous distention [Auscultation Breath Sounds / Voice Sounds] : lungs were clear to auscultation bilaterally [Normal] : the heart rate was normal [Irregularly Irregular] : the rhythm was irregularly irregular [Systolic grade ___/6] : A grade [unfilled]/6 systolic murmur was heard. [___ +] : bilateral [unfilled]+ pitting edema to the ankles [Abdomen Tenderness] : non-tender [Urinary Bladder Findings] : the bladder was normal on palpation [No Spinal Tenderness] : no spinal tenderness [Abnormal Walk] : normal gait [] : no rash [No Focal Deficits] : no focal deficits [Oriented To Time, Place, And Person] : oriented to person, place, and time

## 2023-12-18 LAB
ALBUMIN SERPL ELPH-MCNC: 4.3 G/DL
ALP BLD-CCNC: 78 U/L
ALT SERPL-CCNC: 5 U/L
ANION GAP SERPL CALC-SCNC: 11 MMOL/L
APPEARANCE: CLEAR
AST SERPL-CCNC: 10 U/L
BILIRUB SERPL-MCNC: 0.2 MG/DL
BILIRUBIN URINE: NEGATIVE
BLOOD URINE: NEGATIVE
BUN SERPL-MCNC: 49 MG/DL
CALCIUM SERPL-MCNC: 9.5 MG/DL
CHLORIDE SERPL-SCNC: 109 MMOL/L
CO2 SERPL-SCNC: 20 MMOL/L
COLOR: YELLOW
CREAT SERPL-MCNC: 1.52 MG/DL
CREAT SPEC-SCNC: 79 MG/DL
CREAT/PROT UR: 0.3 RATIO
EGFR: 34 ML/MIN/1.73M2
ESTIMATED AVERAGE GLUCOSE: 131 MG/DL
GLUCOSE QUALITATIVE U: NEGATIVE MG/DL
GLUCOSE SERPL-MCNC: 103 MG/DL
HBA1C MFR BLD HPLC: 6.2 %
HCT VFR BLD CALC: 26.9 %
HGB BLD-MCNC: 8.5 G/DL
KETONES URINE: NEGATIVE MG/DL
LEUKOCYTE ESTERASE URINE: ABNORMAL
MCHC RBC-ENTMCNC: 29.5 PG
MCHC RBC-ENTMCNC: 31.6 GM/DL
MCV RBC AUTO: 93.4 FL
NITRITE URINE: NEGATIVE
PH URINE: 5.5
PLATELET # BLD AUTO: 267 K/UL
POTASSIUM SERPL-SCNC: 5.5 MMOL/L
PROT SERPL-MCNC: 6.2 G/DL
PROT UR-MCNC: 26 MG/DL
PROTEIN URINE: 30 MG/DL
RBC # BLD: 2.88 M/UL
RBC # FLD: 12.8 %
SODIUM SERPL-SCNC: 139 MMOL/L
SPECIFIC GRAVITY URINE: 1.01
UROBILINOGEN URINE: 0.2 MG/DL
WBC # FLD AUTO: 4.97 K/UL

## 2023-12-20 LAB
FERRITIN SERPL-MCNC: 41 NG/ML
IRON SATN MFR SERPL: 10 %
IRON SERPL-MCNC: 29 UG/DL
TIBC SERPL-MCNC: 302 UG/DL
UIBC SERPL-MCNC: 273 UG/DL

## 2023-12-21 LAB — EPO SERPL-MCNC: 11 MIU/ML

## 2024-01-11 ENCOUNTER — APPOINTMENT (OUTPATIENT)
Dept: GERIATRICS | Facility: CLINIC | Age: 84
End: 2024-01-11
Payer: MEDICARE

## 2024-01-11 VITALS
OXYGEN SATURATION: 96 % | HEART RATE: 71 BPM | SYSTOLIC BLOOD PRESSURE: 121 MMHG | RESPIRATION RATE: 20 BRPM | TEMPERATURE: 98 F | DIASTOLIC BLOOD PRESSURE: 67 MMHG

## 2024-01-11 DIAGNOSIS — R60.9 EDEMA, UNSPECIFIED: ICD-10-CM

## 2024-01-11 DIAGNOSIS — R51.9 HEADACHE, UNSPECIFIED: ICD-10-CM

## 2024-01-11 DIAGNOSIS — R05.9 COUGH, UNSPECIFIED: ICD-10-CM

## 2024-01-11 LAB
FLUAV SPEC QL CULT: NEGATIVE
FLUBV AG SPEC QL IA: NEGATIVE

## 2024-01-11 PROCEDURE — 99214 OFFICE O/P EST MOD 30 MIN: CPT

## 2024-01-11 PROCEDURE — 87804 INFLUENZA ASSAY W/OPTIC: CPT | Mod: QW

## 2024-01-13 PROBLEM — R05.9 COUGH: Status: ACTIVE | Noted: 2024-01-11

## 2024-01-13 NOTE — HISTORY OF PRESENT ILLNESS
[No falls in past year] : Patient reported no falls in the past year [Completely Independent] : Completely independent. [0] : 1) Little interest or pleasure doing things: Not at all (0) [1] : 2) Feeling down, depressed, or hopeless for several days (1) [PHQ-2 Negative - No further assessment needed] : PHQ-2 Negative - No further assessment needed [FreeTextEntry1] : Daya Simmons is an 83-year-old female with history of anemia (on po iron), anxiety, atrial fibrillation, GERD, HTN, chronic lower back pain, CKD, type 2 DM, HLD who presents to office today complaining of cold symptoms.  Patient complains of generalized fatigue, dry cough, sore throat, headache, right ear congestion, and intermittent episodes of diarrhea x 5 days. Did not check herself for COVID at home. States she had COVID in the past and recovered quickly. States daughter and grandson were sick recently but apparently tested negative for COVID. Denies chest pain, shortness of breath, abdominal pain, nausea, vomiting, dizziness, fainting. Is tolerating food and liquids.   Has received COVID vaccine in past but has not received a booster shot recently. Received flu vaccine on 10/19/23.    [[id number]] : 1

## 2024-01-13 NOTE — PHYSICAL EXAM
[Alert] : alert [No Acute Distress] : in no acute distress [Sclera] : the sclera and conjunctiva were normal [PERRL] : pupils were equal in size, round, and reactive to light [Normal Oral Mucosa] : normal oral mucosa [Normal Outer Ear/Nose] : the ears and nose were normal in appearance [Both Tympanic Membranes Were Examined] : both tympanic membranes were normal [Normal Appearance] : the appearance of the neck was normal [No Respiratory Distress] : no respiratory distress [No Acc Muscle Use] : no accessory muscle use [Respiration, Rhythm And Depth] : normal respiratory rhythm and effort [Auscultation Breath Sounds / Voice Sounds] : lungs were clear to auscultation bilaterally [Normal S1, S2] : normal S1 and S2 [Heart Rate And Rhythm] : heart rate was normal and rhythm regular [Bowel Sounds] : normal bowel sounds [Abdomen Tenderness] : non-tender [Abdomen Soft] : soft [No Spinal Tenderness] : no spinal tenderness [Normal Gait] : normal gait [No Clubbing, Cyanosis] : no clubbing or cyanosis of the fingernails [Involuntary Movements] : no involuntary movements were seen [Normal Color / Pigmentation] : normal skin color and pigmentation [No Focal Deficits] : no focal deficits [Oriented To Time, Place, And Person] : oriented to person, place, and time [Normal Affect] : the affect was normal [Normal Mood] : the mood was normal [de-identified] : bilateral trace lower extremity edema

## 2024-01-13 NOTE — ASSESSMENT
[FreeTextEntry1] : Advised patient to call office if symptoms change Discussed signs/ symptoms that would warrant ED evaluation Has routine follow up scheduled with Dr. Bauer in 1 week  Available sooner if needed  Maria Dolores ALLEN-BC

## 2024-01-13 NOTE — REVIEW OF SYSTEMS
[Earache] : earache [As Noted in HPI] : as noted in HPI [Eye Pain] : no eye pain [Chest Pain] : no chest pain [Palpitations] : no palpitations [Dysuria] : no dysuria [Joint Swelling] : no joint swelling [Skin Lesions] : no skin lesions [Confused] : no confusion [Dizziness] : no dizziness [Fainting] : no fainting [Anxiety] : no anxiety [Depression] : no depression

## 2024-01-18 ENCOUNTER — APPOINTMENT (OUTPATIENT)
Dept: GERIATRICS | Facility: CLINIC | Age: 84
End: 2024-01-18

## 2024-01-22 ENCOUNTER — RX RENEWAL (OUTPATIENT)
Age: 84
End: 2024-01-22

## 2024-02-02 ENCOUNTER — RX RENEWAL (OUTPATIENT)
Age: 84
End: 2024-02-02

## 2024-02-02 RX ORDER — APIXABAN 5 MG/1
5 TABLET, FILM COATED ORAL
Qty: 180 | Refills: 3 | Status: ACTIVE | COMMUNITY
Start: 2023-05-07 | End: 1900-01-01

## 2024-02-15 ENCOUNTER — APPOINTMENT (OUTPATIENT)
Dept: NEPHROLOGY | Facility: CLINIC | Age: 84
End: 2024-02-15
Payer: MEDICARE

## 2024-02-15 VITALS
RESPIRATION RATE: 16 BRPM | HEIGHT: 59 IN | OXYGEN SATURATION: 98 % | BODY MASS INDEX: 37.5 KG/M2 | WEIGHT: 186 LBS | DIASTOLIC BLOOD PRESSURE: 70 MMHG | HEART RATE: 72 BPM | SYSTOLIC BLOOD PRESSURE: 124 MMHG

## 2024-02-15 PROCEDURE — 99213 OFFICE O/P EST LOW 20 MIN: CPT

## 2024-02-15 RX ORDER — BUMETANIDE 0.5 MG/1
0.5 TABLET ORAL
Qty: 120 | Refills: 4 | Status: ACTIVE | COMMUNITY
Start: 2022-06-22 | End: 1900-01-01

## 2024-02-15 RX ORDER — LOSARTAN POTASSIUM 25 MG/1
25 TABLET, FILM COATED ORAL
Qty: 90 | Refills: 3 | Status: ACTIVE | COMMUNITY
Start: 2022-02-04 | End: 1900-01-01

## 2024-02-15 RX ORDER — ROSUVASTATIN CALCIUM 10 MG/1
10 TABLET, FILM COATED ORAL
Qty: 90 | Refills: 3 | Status: ACTIVE | COMMUNITY
Start: 2021-07-02 | End: 1900-01-01

## 2024-02-15 RX ORDER — AMLODIPINE BESYLATE 10 MG/1
10 TABLET ORAL DAILY
Qty: 90 | Refills: 3 | Status: ACTIVE | COMMUNITY
Start: 2019-05-02 | End: 1900-01-01

## 2024-02-15 RX ORDER — METOPROLOL TARTRATE 25 MG/1
25 TABLET, FILM COATED ORAL
Qty: 180 | Refills: 3 | Status: ACTIVE | COMMUNITY
Start: 2021-07-01 | End: 1900-01-01

## 2024-02-15 RX ORDER — METFORMIN HYDROCHLORIDE 500 MG/1
500 TABLET, COATED ORAL
Qty: 180 | Refills: 3 | Status: ACTIVE | COMMUNITY
Start: 2019-05-02 | End: 1900-01-01

## 2024-02-15 RX ORDER — GLIMEPIRIDE 2 MG/1
2 TABLET ORAL
Qty: 90 | Refills: 3 | Status: ACTIVE | COMMUNITY
Start: 2019-03-21 | End: 1900-01-01

## 2024-02-15 RX ORDER — GABAPENTIN 100 MG/1
100 CAPSULE ORAL
Qty: 90 | Refills: 3 | Status: DISCONTINUED | COMMUNITY
Start: 2023-12-15 | End: 2024-02-15

## 2024-02-15 NOTE — REASON FOR VISIT
[Follow-Up] : a follow-up visit [FreeTextEntry1] : DARYL MENCHACA is a 83 year old female being seen for a follow-up visit, Hypertension, Atrial fibrillation, CKD III, type II DM.
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
## 2024-02-15 NOTE — ASSESSMENT
[FreeTextEntry1] : 1. CKD III. Last labs showed stable renal function. 2. Hypertension: well controlled. 3. Type II DM controlled. 4. Atrial fibrillation on Eliquis, today in NSR. 5. Irone deficiency. Tolerating iron supplementation. WIll check level at next visit.

## 2024-02-15 NOTE — PHYSICAL EXAM
[General Appearance - Alert] : alert [General Appearance - In No Acute Distress] : in no acute distress [Sclera] : the sclera and conjunctiva were normal [Hearing Threshold Finger Rub Not [place]] : hearing was normal [Jugular Venous Distention Increased] : there was no jugular-venous distention [Auscultation Breath Sounds / Voice Sounds] : lungs were clear to auscultation bilaterally [Normal] : the heart rate was normal [Regular] : the rhythm was regular [Systolic grade ___/6] : A grade [unfilled]/6 systolic murmur was heard. [Pitting Edema] : pitting edema present [___ +] : bilateral [unfilled]+ pitting edema to the ankles [Abdomen Tenderness] : non-tender [Urinary Bladder Findings] : the bladder was normal on palpation [No CVA Tenderness] : no ~M costovertebral angle tenderness [Abnormal Walk] : normal gait [] : no rash [No Focal Deficits] : no focal deficits [Oriented To Time, Place, And Person] : oriented to person, place, and time

## 2024-02-27 ENCOUNTER — APPOINTMENT (OUTPATIENT)
Dept: GERIATRICS | Facility: CLINIC | Age: 84
End: 2024-02-27

## 2024-03-19 ENCOUNTER — APPOINTMENT (OUTPATIENT)
Dept: GERIATRICS | Facility: CLINIC | Age: 84
End: 2024-03-19
Payer: MEDICARE

## 2024-03-19 VITALS
WEIGHT: 183 LBS | TEMPERATURE: 97.2 F | HEART RATE: 87 BPM | BODY MASS INDEX: 36.89 KG/M2 | HEIGHT: 59 IN | OXYGEN SATURATION: 97 % | DIASTOLIC BLOOD PRESSURE: 72 MMHG | SYSTOLIC BLOOD PRESSURE: 158 MMHG

## 2024-03-19 DIAGNOSIS — R92.8 OTHER ABNORMAL AND INCONCLUSIVE FINDINGS ON DIAGNOSTIC IMAGING OF BREAST: ICD-10-CM

## 2024-03-19 PROCEDURE — 99214 OFFICE O/P EST MOD 30 MIN: CPT

## 2024-03-19 PROCEDURE — G2211 COMPLEX E/M VISIT ADD ON: CPT

## 2024-03-19 RX ORDER — BENZONATATE 100 MG/1
100 CAPSULE ORAL 3 TIMES DAILY
Qty: 30 | Refills: 0 | Status: DISCONTINUED | COMMUNITY
Start: 2024-01-11 | End: 2024-03-19

## 2024-03-19 RX ORDER — SODIUM BICARBONATE 650 MG/1
650 TABLET ORAL 3 TIMES DAILY
Qty: 180 | Refills: 0 | Status: DISCONTINUED | COMMUNITY
Start: 2022-06-22 | End: 2024-03-19

## 2024-03-19 NOTE — PHYSICAL EXAM
[Normal S1, S2] : normal S1 and S2 [Normal] : normal affect and normal mood [de-identified] : Trace pretibial edema bilaterally
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
## 2024-03-20 LAB
ALBUMIN SERPL ELPH-MCNC: 4.4 G/DL
ALP BLD-CCNC: 88 U/L
ALT SERPL-CCNC: 6 U/L
ANION GAP SERPL CALC-SCNC: 13 MMOL/L
AST SERPL-CCNC: 12 U/L
BASOPHILS # BLD AUTO: 0.1 K/UL
BASOPHILS NFR BLD AUTO: 1.5 %
BILIRUB SERPL-MCNC: 0.2 MG/DL
BUN SERPL-MCNC: 48 MG/DL
CALCIUM SERPL-MCNC: 9.6 MG/DL
CHLORIDE SERPL-SCNC: 104 MMOL/L
CO2 SERPL-SCNC: 23 MMOL/L
CREAT SERPL-MCNC: 1.47 MG/DL
EGFR: 36 ML/MIN/1.73M2
EOSINOPHIL # BLD AUTO: 0.23 K/UL
EOSINOPHIL NFR BLD AUTO: 3.4 %
ESTIMATED AVERAGE GLUCOSE: 128 MG/DL
FERRITIN SERPL-MCNC: 50 NG/ML
GLUCOSE SERPL-MCNC: 132 MG/DL
HBA1C MFR BLD HPLC: 6.1 %
HCT VFR BLD CALC: 27.4 %
HGB BLD-MCNC: 8.9 G/DL
IMM GRANULOCYTES NFR BLD AUTO: 0.1 %
IRON SATN MFR SERPL: 9 %
IRON SERPL-MCNC: 28 UG/DL
LYMPHOCYTES # BLD AUTO: 0.95 K/UL
LYMPHOCYTES NFR BLD AUTO: 14 %
MAN DIFF?: NORMAL
MCHC RBC-ENTMCNC: 29.3 PG
MCHC RBC-ENTMCNC: 32.5 GM/DL
MCV RBC AUTO: 90.1 FL
MONOCYTES # BLD AUTO: 0.65 K/UL
MONOCYTES NFR BLD AUTO: 9.6 %
NEUTROPHILS # BLD AUTO: 4.85 K/UL
NEUTROPHILS NFR BLD AUTO: 71.4 %
PLATELET # BLD AUTO: 324 K/UL
POTASSIUM SERPL-SCNC: 5.3 MMOL/L
PROT SERPL-MCNC: 6.9 G/DL
RBC # BLD: 3.04 M/UL
RBC # FLD: 12.3 %
SODIUM SERPL-SCNC: 140 MMOL/L
TIBC SERPL-MCNC: 305 UG/DL
UIBC SERPL-MCNC: 277 UG/DL
WBC # FLD AUTO: 6.79 K/UL

## 2024-04-03 ENCOUNTER — NON-APPOINTMENT (OUTPATIENT)
Age: 84
End: 2024-04-03

## 2024-04-03 ENCOUNTER — APPOINTMENT (OUTPATIENT)
Dept: GERIATRICS | Facility: CLINIC | Age: 84
End: 2024-04-03
Payer: MEDICARE

## 2024-04-03 VITALS — DIASTOLIC BLOOD PRESSURE: 62 MMHG | SYSTOLIC BLOOD PRESSURE: 144 MMHG

## 2024-04-03 VITALS
DIASTOLIC BLOOD PRESSURE: 66 MMHG | OXYGEN SATURATION: 97 % | WEIGHT: 180 LBS | SYSTOLIC BLOOD PRESSURE: 156 MMHG | BODY MASS INDEX: 36.36 KG/M2 | HEART RATE: 76 BPM | RESPIRATION RATE: 16 BRPM | TEMPERATURE: 98 F

## 2024-04-03 VITALS — HEART RATE: 89 BPM | OXYGEN SATURATION: 96 %

## 2024-04-03 DIAGNOSIS — R06.09 OTHER FORMS OF DYSPNEA: ICD-10-CM

## 2024-04-03 DIAGNOSIS — R07.89 OTHER CHEST PAIN: ICD-10-CM

## 2024-04-03 PROCEDURE — 93000 ELECTROCARDIOGRAM COMPLETE: CPT

## 2024-04-03 PROCEDURE — 99214 OFFICE O/P EST MOD 30 MIN: CPT

## 2024-04-03 PROCEDURE — G2211 COMPLEX E/M VISIT ADD ON: CPT

## 2024-04-03 NOTE — PHYSICAL EXAM
[Auscultation Breath Sounds / Voice Sounds] : lungs were clear to auscultation bilaterally [Normal S1, S2] : normal S1 and S2 [No Spinal Tenderness] : no spinal tenderness [Involuntary Movements] : no involuntary movements were seen [Normal] : normal affect and normal mood [de-identified] : Trace ankle edema

## 2024-04-03 NOTE — REVIEW OF SYSTEMS
[As Noted in HPI] : as noted in HPI [Heart Rate Is Slow] : the heart rate was not slow [Heart Rate Is Fast] : the heart rate was not fast [Palpitations] : no palpitations [Leg Claudication] : no intermittent leg claudication [Cough] : no cough [SOB on Exertion] : shortness of breath during exertion [Negative] : Heme/Lymph

## 2024-04-03 NOTE — HISTORY OF PRESENT ILLNESS
[FreeTextEntry1] : Mrs. Simmons is an 83-year-old woman who was seen roughly 2 weeks ago on a routine visit.  The patient states for about the past week or so she has been experiencing increased exertional dyspnea.  In the office we were able to walk her about 250 feet, and she became dyspneic around 200 feet with a pulse ox of 96%. The patient also admits that she experienced 1 episode of precordial chest pressure last week, lasting "a couple of minutes".  She rated the pain as 5/10, with some discomfort in her mid upper back.  She denies associated palpitations, diaphoresis or sweats. She states that the dyspnea has not gotten worse, and she has not had a recurrence of chest pressure. [No falls in past year] : Patient reported no falls in the past year

## 2024-04-24 ENCOUNTER — NON-APPOINTMENT (OUTPATIENT)
Age: 84
End: 2024-04-24

## 2024-04-24 ENCOUNTER — APPOINTMENT (OUTPATIENT)
Dept: CARDIOLOGY | Facility: CLINIC | Age: 84
End: 2024-04-24
Payer: MEDICARE

## 2024-04-24 VITALS
SYSTOLIC BLOOD PRESSURE: 151 MMHG | DIASTOLIC BLOOD PRESSURE: 65 MMHG | WEIGHT: 180 LBS | HEIGHT: 59 IN | HEART RATE: 78 BPM | BODY MASS INDEX: 36.29 KG/M2 | OXYGEN SATURATION: 98 %

## 2024-04-24 PROCEDURE — G2211 COMPLEX E/M VISIT ADD ON: CPT

## 2024-04-24 PROCEDURE — 99214 OFFICE O/P EST MOD 30 MIN: CPT

## 2024-04-24 PROCEDURE — 93000 ELECTROCARDIOGRAM COMPLETE: CPT

## 2024-04-28 NOTE — PHYSICAL EXAM
[Well Developed] : well developed [Well Nourished] : well nourished [No Acute Distress] : no acute distress [Obese] : obese [Normal Conjunctiva] : normal conjunctiva [Normal Venous Pressure] : normal venous pressure [No Carotid Bruit] : no carotid bruit [No Murmur] : no murmur [No Rub] : no rub [No Gallop] : no gallop [Clear Lung Fields] : clear lung fields [Good Air Entry] : good air entry [No Respiratory Distress] : no respiratory distress  [Soft] : abdomen soft [Non Tender] : non-tender [Normal Gait] : normal gait [No Cyanosis] : no cyanosis [No Rash] : no rash [No Skin Lesions] : no skin lesions [Moves all extremities] : moves all extremities [No Focal Deficits] : no focal deficits [Normal Speech] : normal speech [Alert and Oriented] : alert and oriented [de-identified] : irregular [de-identified] : bilateral LE 1+ pitting edema R>L

## 2024-04-28 NOTE — DISCUSSION/SUMMARY
[Paroxysmal Atrial Fibrillation] : paroxysmal atrial fibrillation [Stable] : stable [Compensated] : compensated [Hypertension] : hypertension [Low Sodium Diet] : low sodium diet [FreeTextEntry1] : Currently stable from a cardiovascular standpoint. Hypertensive today. Patient with likely dependent edema. History of paroxysmal atrial fibrillation ([id number] score 4). Currently in sinus rhythm with sinus arrhythmia. Patient with CKD stage IIIb (creatinine 1.47, eGFR 36, potassium 5.3). Exertional dyspnea likely secondary to volume status and physical deconditioning. May also be related to anemia (Hgb 8.9 on 03/19/24). Patient with chronic back pains. Continue current medications including apixaban, bumetanide and metoprolol tartrate. ECG completed today and reviewed (findings as noted above). Follow up in 4 months. Recommend follow up with PCP for further evaluation and management of anemia (patient with trouble taking iron tablets). [EKG obtained to assist in diagnosis and management of assessed problem(s)] : EKG obtained to assist in diagnosis and management of assessed problem(s)

## 2024-04-28 NOTE — CARDIOLOGY SUMMARY
[de-identified] : 04/24/243 - sinus rhythm with sinus arrhythmia, 71 bpm, short PA, poor R-wave progression [de-identified] : 08/30/19 (exercise MIBI) - 5 METS< 90% MPHR, 03:38 min, no ECG abnormalities, Duke score 3, no evidence for myocardial infarction or ischemia, LVEF 68% [de-identified] : 06/20/21 - MAC, mild MR, mild MS (mean MV gradient 3 mmHg), normal LA, grossly normal LV systolic function, concentric LV remodeling, normal RV size and function, RVSP 29 mmHg, LVEF 60%

## 2024-04-28 NOTE — REVIEW OF SYSTEMS
[Feeling Fatigued] : feeling fatigued [Dyspnea on exertion] : dyspnea during exertion [Lower Ext Edema] : lower extremity edema [Negative] : Musculoskeletal [Chest Discomfort] : no chest discomfort [Leg Claudication] : no intermittent leg claudication [Palpitations] : no palpitations [Orthopnea] : no orthopnea [PND] : no PND [Syncope] : no syncope

## 2024-04-28 NOTE — HISTORY OF PRESENT ILLNESS
[FreeTextEntry1] : Complains of feeling tired and short of breath for past couple months. Denies chest pain or palpitations. Denies fever, chills, or recent viral illness. Has history of anemia for which she takes iron supplements but she states that the pills messes her up.

## 2024-05-08 ENCOUNTER — RESULT REVIEW (OUTPATIENT)
Age: 84
End: 2024-05-08

## 2024-05-08 ENCOUNTER — OUTPATIENT (OUTPATIENT)
Dept: OUTPATIENT SERVICES | Facility: HOSPITAL | Age: 84
LOS: 1 days | End: 2024-05-08
Payer: MEDICARE

## 2024-05-08 ENCOUNTER — APPOINTMENT (OUTPATIENT)
Dept: RADIOLOGY | Facility: IMAGING CENTER | Age: 84
End: 2024-05-08
Payer: MEDICARE

## 2024-05-08 ENCOUNTER — APPOINTMENT (OUTPATIENT)
Dept: MAMMOGRAPHY | Facility: IMAGING CENTER | Age: 84
End: 2024-05-08
Payer: MEDICARE

## 2024-05-08 DIAGNOSIS — R07.89 OTHER CHEST PAIN: ICD-10-CM

## 2024-05-08 DIAGNOSIS — R92.8 OTHER ABNORMAL AND INCONCLUSIVE FINDINGS ON DIAGNOSTIC IMAGING OF BREAST: ICD-10-CM

## 2024-05-08 PROCEDURE — 77067 SCR MAMMO BI INCL CAD: CPT

## 2024-05-08 PROCEDURE — 71046 X-RAY EXAM CHEST 2 VIEWS: CPT | Mod: 26

## 2024-05-08 PROCEDURE — 71046 X-RAY EXAM CHEST 2 VIEWS: CPT

## 2024-05-08 PROCEDURE — 77063 BREAST TOMOSYNTHESIS BI: CPT

## 2024-05-08 PROCEDURE — 77067 SCR MAMMO BI INCL CAD: CPT | Mod: 26

## 2024-05-08 PROCEDURE — 77063 BREAST TOMOSYNTHESIS BI: CPT | Mod: 26

## 2024-05-17 ENCOUNTER — APPOINTMENT (OUTPATIENT)
Dept: NEPHROLOGY | Facility: CLINIC | Age: 84
End: 2024-05-17
Payer: MEDICARE

## 2024-05-17 VITALS
RESPIRATION RATE: 16 BRPM | WEIGHT: 180 LBS | SYSTOLIC BLOOD PRESSURE: 118 MMHG | BODY MASS INDEX: 36.29 KG/M2 | HEART RATE: 68 BPM | HEIGHT: 59 IN | OXYGEN SATURATION: 98 % | DIASTOLIC BLOOD PRESSURE: 62 MMHG

## 2024-05-17 DIAGNOSIS — E87.5 HYPERKALEMIA: ICD-10-CM

## 2024-05-17 DIAGNOSIS — E61.1 IRON DEFICIENCY: ICD-10-CM

## 2024-05-17 PROCEDURE — 99213 OFFICE O/P EST LOW 20 MIN: CPT

## 2024-05-17 NOTE — HISTORY OF PRESENT ILLNESS
[FreeTextEntry1] : Last visit in February of 2024. Had follow up w/ PCP/Geriatrician and Cardiologist.  The latter suggested that she should have her anemia treated. The patient is intolerant to PO Iron because if gastrointestinal side effects. She has stopped the iron pills. Feels weak and her stamina is decreased. She is aware that she is iron deficient. Recent labs showed low iron. Creatinine was stable at 1.47, eGFR of 36.  Here for a routine follow up She states that her back pain is better, but the shoulder pain keeps her up at night.

## 2024-05-17 NOTE — ASSESSMENT
[FreeTextEntry1] : 1. Iron deficiency anemia.  May also have erythropoietin deficiency as the eGFR is 36. Will check levels. The patient has agreed to undergo Iron Infusion therapy. Will see the blood tests results and discuss it with her on Monday. 2. CKD III, from type II DM, stable. 3. Hypertension: well controlled. 4. Atrial fibrillation: in NSR. Will check labs and call the patient on Monday. Return in September.

## 2024-05-17 NOTE — PHYSICAL EXAM
[General Appearance - Alert] : alert [General Appearance - In No Acute Distress] : in no acute distress [FreeTextEntry1] : Pale conjunctiva [Hearing Threshold Finger Rub Not [place]] : hearing was normal [Jugular Venous Distention Increased] : there was no jugular-venous distention [Auscultation Breath Sounds / Voice Sounds] : lungs were clear to auscultation bilaterally [Heart Rate And Rhythm] : heart rate was normal and rhythm regular [Normal] : the heart rate was normal [Regular] : the rhythm was regular [Systolic grade ___/6] : A grade [unfilled]/6 systolic murmur was heard. [___ +] : bilateral [unfilled]+ pitting edema to the ankles [Abdomen Tenderness] : non-tender [Urinary Bladder Findings] : the bladder was normal on palpation [Abnormal Walk] : normal gait [] : no rash [No Focal Deficits] : no focal deficits [Oriented To Time, Place, And Person] : oriented to person, place, and time

## 2024-05-20 DIAGNOSIS — D50.9 IRON DEFICIENCY ANEMIA, UNSPECIFIED: ICD-10-CM

## 2024-05-20 LAB
ALBUMIN SERPL ELPH-MCNC: 4.4 G/DL
ANION GAP SERPL CALC-SCNC: 13 MMOL/L
BUN SERPL-MCNC: 43 MG/DL
CALCIUM SERPL-MCNC: 9.3 MG/DL
CHLORIDE SERPL-SCNC: 106 MMOL/L
CHOLEST SERPL-MCNC: 142 MG/DL
CO2 SERPL-SCNC: 20 MMOL/L
CREAT SERPL-MCNC: 1.63 MG/DL
EGFR: 31 ML/MIN/1.73M2
ESTIMATED AVERAGE GLUCOSE: 128 MG/DL
GLUCOSE SERPL-MCNC: 83 MG/DL
HBA1C MFR BLD HPLC: 6.1 %
HCT VFR BLD CALC: 25.6 %
HDLC SERPL-MCNC: 59 MG/DL
HGB BLD-MCNC: 8.1 G/DL
IRON SATN MFR SERPL: 7 %
IRON SERPL-MCNC: 24 UG/DL
LDLC SERPL CALC-MCNC: 63 MG/DL
MCHC RBC-ENTMCNC: 28.5 PG
MCHC RBC-ENTMCNC: 31.6 GM/DL
MCV RBC AUTO: 90.1 FL
NONHDLC SERPL-MCNC: 83 MG/DL
PHOSPHATE SERPL-MCNC: 3.7 MG/DL
PLATELET # BLD AUTO: 307 K/UL
POTASSIUM SERPL-SCNC: 5.4 MMOL/L
RBC # BLD: 2.84 M/UL
RBC # FLD: 12.8 %
SODIUM SERPL-SCNC: 138 MMOL/L
TIBC SERPL-MCNC: 331 UG/DL
TRIGL SERPL-MCNC: 116 MG/DL
UIBC SERPL-MCNC: 307 UG/DL
WBC # FLD AUTO: 7.04 K/UL

## 2024-05-21 LAB — EPO SERPL-MCNC: 30.8 MIU/ML

## 2024-05-22 ENCOUNTER — APPOINTMENT (OUTPATIENT)
Dept: RHEUMATOLOGY | Facility: CLINIC | Age: 84
End: 2024-05-22
Payer: MEDICARE

## 2024-05-22 VITALS
TEMPERATURE: 97.9 F | WEIGHT: 170 LBS | BODY MASS INDEX: 34.34 KG/M2 | OXYGEN SATURATION: 95 % | DIASTOLIC BLOOD PRESSURE: 65 MMHG | HEART RATE: 69 BPM | RESPIRATION RATE: 16 BRPM | SYSTOLIC BLOOD PRESSURE: 139 MMHG

## 2024-05-22 VITALS — SYSTOLIC BLOOD PRESSURE: 130 MMHG | HEART RATE: 65 BPM | OXYGEN SATURATION: 95 % | DIASTOLIC BLOOD PRESSURE: 70 MMHG

## 2024-05-22 PROCEDURE — 96365 THER/PROPH/DIAG IV INF INIT: CPT

## 2024-05-22 RX ORDER — FERUMOXYTOL 510 MG/17ML
510 INJECTION INTRAVENOUS
Qty: 0 | Refills: 0 | Status: COMPLETED | OUTPATIENT
Start: 2024-05-20

## 2024-05-22 NOTE — HISTORY OF PRESENT ILLNESS
[Yes] : Yes [10] : 10 [Denies] : Denies [No] : No [N/A] : N/A [de-identified] : back [de-identified] : first infusion [Right upper extremity] : Right upper extremity [24g] : 24g [Start Time: ___] : Medication Start Time: [unfilled] [End Time: ___] : Medication End Time: [unfilled] [Medication Name: ___] : Medication Name: [unfilled] [Total Amount Administered: ___] : Total Amount Administered: [unfilled] [IV discontinued. Intact. No signs or symptoms of IV complications noted. Time: ___] : IV discontinued. Intact. No signs or symptoms of IV complications noted. Time: [unfilled] [Patient  instructed to seek medical attention with signs and symptoms of adverse effects] : Patient  instructed to seek medical attention with signs and symptoms of adverse effects [Patient left unit in no acute distress] : Patient left unit in no acute distress [Medications administered as ordered and tolerated well.] : Medications administered as ordered and tolerated well. [de-identified] : RAC [de-identified] : Patient presents for first scheduled Feraheme infusion. Patient denies any recent infection, hospitalization, or ABX use. Patient complains of chronic back pain 10/10. Patient reports moderate fatigue and mild SOB with exertion. Patient denies any numbness, tingling, or feeling cold. Patient denies any other concerns or symptoms. Patient educated on medication in length, patient verbalized understanding. Patient tolerated infusion well. Remained with patient for first 10 mins of infusion. Patient monitored for 30 minutes post infusion, no AE noted throughout. Patient ambulated off unit, NAD.

## 2024-05-30 ENCOUNTER — APPOINTMENT (OUTPATIENT)
Dept: RHEUMATOLOGY | Facility: CLINIC | Age: 84
End: 2024-05-30
Payer: MEDICARE

## 2024-05-30 VITALS — OXYGEN SATURATION: 95 % | SYSTOLIC BLOOD PRESSURE: 129 MMHG | HEART RATE: 55 BPM | DIASTOLIC BLOOD PRESSURE: 68 MMHG

## 2024-05-30 VITALS
HEART RATE: 63 BPM | OXYGEN SATURATION: 96 % | SYSTOLIC BLOOD PRESSURE: 155 MMHG | RESPIRATION RATE: 16 BRPM | DIASTOLIC BLOOD PRESSURE: 64 MMHG | TEMPERATURE: 97.9 F

## 2024-05-30 PROCEDURE — 96365 THER/PROPH/DIAG IV INF INIT: CPT

## 2024-05-30 RX ORDER — FERUMOXYTOL 510 MG/17ML
510 INJECTION INTRAVENOUS
Qty: 0 | Refills: 0 | Status: COMPLETED | OUTPATIENT
Start: 2024-05-29

## 2024-05-30 NOTE — HISTORY OF PRESENT ILLNESS
Message #1 left for Sasha to call back.    [N/A] : N/A [Denies] : Denies [No] : No [Yes] : Yes [Declined] : Declined [de-identified] : left hand [Right upper extremity] : Right upper extremity [24g] : 24g [Start Time: ___] : Medication Start Time: [unfilled] [End Time: ___] : Medication End Time: [unfilled] [Medication Name: ___] : Medication Name: [unfilled] [Total Amount Administered: ___] : Total Amount Administered: [unfilled] [IV discontinued. Intact. No signs or symptoms of IV complications noted. Time: ___] : IV discontinued. Intact. No signs or symptoms of IV complications noted. Time: [unfilled] [Patient  instructed to seek medical attention with signs and symptoms of adverse effects] : Patient  instructed to seek medical attention with signs and symptoms of adverse effects [Patient left unit in no acute distress] : Patient left unit in no acute distress [Medications administered as ordered and tolerated well.] : Medications administered as ordered and tolerated well. [de-identified] : right medial vein  [de-identified] : Patient presents for scheduled Feraheme infusion. Patient reports last infusion tolerated well and denies any AEs. Patient denies significant changes since last infusion. Denies recent infection or abx use. Patient reports having pain to left hand. Patient reports Improvement of daily fatigue. Patient denies any other concerns or symptoms. Medication administered as per protocol and tolerated well. Patient observed for 30 mins without immediate AE's noted or reported. Discharged instructions provided and patient left unit ambulatory in [facility].

## 2024-06-18 ENCOUNTER — NON-APPOINTMENT (OUTPATIENT)
Age: 84
End: 2024-06-18

## 2024-06-18 ENCOUNTER — APPOINTMENT (OUTPATIENT)
Dept: GERIATRICS | Facility: CLINIC | Age: 84
End: 2024-06-18
Payer: MEDICARE

## 2024-06-18 VITALS — DIASTOLIC BLOOD PRESSURE: 60 MMHG | SYSTOLIC BLOOD PRESSURE: 134 MMHG

## 2024-06-18 VITALS
RESPIRATION RATE: 16 BRPM | WEIGHT: 177 LBS | HEART RATE: 70 BPM | TEMPERATURE: 98 F | BODY MASS INDEX: 35.75 KG/M2 | OXYGEN SATURATION: 97 % | SYSTOLIC BLOOD PRESSURE: 152 MMHG | DIASTOLIC BLOOD PRESSURE: 70 MMHG

## 2024-06-18 DIAGNOSIS — I48.91 UNSPECIFIED ATRIAL FIBRILLATION: ICD-10-CM

## 2024-06-18 DIAGNOSIS — G89.29 LUMBAGO WITH SCIATICA, LEFT SIDE: ICD-10-CM

## 2024-06-18 DIAGNOSIS — E78.5 HYPERLIPIDEMIA, UNSPECIFIED: ICD-10-CM

## 2024-06-18 DIAGNOSIS — I10 ESSENTIAL (PRIMARY) HYPERTENSION: ICD-10-CM

## 2024-06-18 DIAGNOSIS — D64.9 ANEMIA, UNSPECIFIED: ICD-10-CM

## 2024-06-18 DIAGNOSIS — N18.30 CHRONIC KIDNEY DISEASE, STAGE 3 UNSPECIFIED: ICD-10-CM

## 2024-06-18 DIAGNOSIS — M54.42 LUMBAGO WITH SCIATICA, LEFT SIDE: ICD-10-CM

## 2024-06-18 DIAGNOSIS — R60.0 LOCALIZED EDEMA: ICD-10-CM

## 2024-06-18 DIAGNOSIS — C18.9 MALIGNANT NEOPLASM OF COLON, UNSPECIFIED: ICD-10-CM

## 2024-06-18 DIAGNOSIS — F41.8 OTHER SPECIFIED ANXIETY DISORDERS: ICD-10-CM

## 2024-06-18 DIAGNOSIS — M54.41 LUMBAGO WITH SCIATICA, LEFT SIDE: ICD-10-CM

## 2024-06-18 DIAGNOSIS — E11.9 TYPE 2 DIABETES MELLITUS W/OUT COMPLICATIONS: ICD-10-CM

## 2024-06-18 PROCEDURE — 99214 OFFICE O/P EST MOD 30 MIN: CPT

## 2024-06-18 PROCEDURE — G2211 COMPLEX E/M VISIT ADD ON: CPT

## 2024-06-18 RX ORDER — HYDROCODONE BITARTRATE AND ACETAMINOPHEN 7.5; 3 MG/1; MG/1
7.5-3 TABLET ORAL
Qty: 120 | Refills: 0 | Status: ACTIVE | COMMUNITY
Start: 2021-10-01 | End: 1900-01-01

## 2024-06-18 NOTE — PHYSICAL EXAM
[Normal] : no spinal tenderness [Normal Gait] : normal gait [Involuntary Movements] : no involuntary movements were seen [Normal Color / Pigmentation] : normal skin color and pigmentation [No Focal Deficits] : no focal deficits [Normal Affect] : the affect was normal [de-identified] : 1+ bilateral lower extremity edema.  No prominent varicosities.  No calf masses or tenderness. [de-identified] : No palpable deformity left anterior tibia.  Minimal tenderness to firm palpation over anterior mid tibial region.  No bruising present

## 2024-06-18 NOTE — HISTORY OF PRESENT ILLNESS
[FreeTextEntry1] : Mrs. Simmons presents for follow-up.  She is status post 2 iron infusions in the past month, 1 roughly 3 weeks ago and again 2 weeks ago.  Tolerated well.  She feels that she has more energy.  Iron was ordered by nephrology Dr. Jason.  Patient still resistant to following up with heme/onc and GI for history of colon cancer. Patient states she has had a little bit of pain in her left anterior mid tibia for the past few days.  She does recall any trauma and has not developed any bruising.  She does have some chronic lower extremity edema. [No falls in past year] : Patient reported no falls in the past year [Completely Independent] : Completely independent. [NO] : No

## 2024-06-19 LAB
ANION GAP SERPL CALC-SCNC: 13 MMOL/L
BUN SERPL-MCNC: 43 MG/DL
CALCIUM SERPL-MCNC: 8.9 MG/DL
CHLORIDE SERPL-SCNC: 103 MMOL/L
CO2 SERPL-SCNC: 19 MMOL/L
CREAT SERPL-MCNC: 1.45 MG/DL
EGFR: 36 ML/MIN/1.73M2
GLUCOSE SERPL-MCNC: 193 MG/DL
HCT VFR BLD CALC: 27.8 %
HGB BLD-MCNC: 8.6 G/DL
IRON SATN MFR SERPL: 21 %
IRON SERPL-MCNC: 51 UG/DL
MCHC RBC-ENTMCNC: 28.9 PG
MCHC RBC-ENTMCNC: 30.9 GM/DL
MCV RBC AUTO: 93.3 FL
PLATELET # BLD AUTO: 262 K/UL
POTASSIUM SERPL-SCNC: 5 MMOL/L
RBC # BLD: 2.98 M/UL
RBC # FLD: 14.3 %
SODIUM SERPL-SCNC: 135 MMOL/L
TIBC SERPL-MCNC: 240 UG/DL
UIBC SERPL-MCNC: 189 UG/DL
WBC # FLD AUTO: 6.34 K/UL

## 2024-08-16 ENCOUNTER — APPOINTMENT (OUTPATIENT)
Dept: NEPHROLOGY | Facility: CLINIC | Age: 84
End: 2024-08-16
Payer: MEDICARE

## 2024-08-16 VITALS
HEART RATE: 68 BPM | RESPIRATION RATE: 16 BRPM | SYSTOLIC BLOOD PRESSURE: 124 MMHG | WEIGHT: 178 LBS | BODY MASS INDEX: 35.88 KG/M2 | HEIGHT: 59 IN | DIASTOLIC BLOOD PRESSURE: 58 MMHG | OXYGEN SATURATION: 98 %

## 2024-08-16 VITALS — RESPIRATION RATE: 16 BRPM | HEART RATE: 68 BPM | OXYGEN SATURATION: 98 % | HEIGHT: 59 IN

## 2024-08-16 DIAGNOSIS — N18.30 CHRONIC KIDNEY DISEASE, STAGE 3 UNSPECIFIED: ICD-10-CM

## 2024-08-16 DIAGNOSIS — R60.0 LOCALIZED EDEMA: ICD-10-CM

## 2024-08-16 PROCEDURE — 99213 OFFICE O/P EST LOW 20 MIN: CPT

## 2024-08-16 NOTE — HISTORY OF PRESENT ILLNESS
[FreeTextEntry1] : Noted worsening lower extremity edema over the last several weeks. Admits a higher Na intake. No shortness of breath or palpitations.  Usual issue w/ daughter. Quite anxious. Tends to fall asleep watching TV and then cannot fall asleep once in bed.  Here for follow up.

## 2024-08-16 NOTE — PHYSICAL EXAM
[General Appearance - Alert] : alert [General Appearance - In No Acute Distress] : in no acute distress [Hearing Threshold Finger Rub Not [place]] : hearing was normal [Jugular Venous Distention Increased] : there was no jugular-venous distention [FreeTextEntry1] : Some basal rales at the left lower lung field. [Heart Rate And Rhythm] : heart rate was normal and rhythm regular [Normal] : the heart rate was normal [Regular] : the rhythm was regular [Systolic grade ___/6] : A grade [unfilled]/6 systolic murmur was heard. [___ +] : bilateral [unfilled]+ pitting edema to the ankles [Abdomen Tenderness] : non-tender [Urinary Bladder Findings] : the bladder was normal on palpation [Abnormal Walk] : normal gait [] : no rash [No Focal Deficits] : no focal deficits

## 2024-08-16 NOTE — ASSESSMENT
[FreeTextEntry1] : 1. Worsening lower extremity edema with some evidence of congestion.  Bumex dose increased to 1 mg twice a day. Discussed to avoid excessive Na intake. Advised to check legs as she does not have reliable scale at home. If she develops orthostatic dizziness or cramps to call to have dose of Bumex adjusted.  2. CKD III Stable. Return in 3 months.  Also discussed sleep hygiene including relaxation techniques to facilitate falling asleep and avoidance of sleeping when watching TV.

## 2024-09-23 ENCOUNTER — NON-APPOINTMENT (OUTPATIENT)
Age: 84
End: 2024-09-23

## 2024-09-24 ENCOUNTER — APPOINTMENT (OUTPATIENT)
Dept: GERIATRICS | Facility: CLINIC | Age: 84
End: 2024-09-24
Payer: MEDICARE

## 2024-09-24 ENCOUNTER — NON-APPOINTMENT (OUTPATIENT)
Age: 84
End: 2024-09-24

## 2024-09-24 VITALS
BODY MASS INDEX: 34.13 KG/M2 | RESPIRATION RATE: 15 BRPM | HEART RATE: 61 BPM | TEMPERATURE: 97.9 F | WEIGHT: 169 LBS | DIASTOLIC BLOOD PRESSURE: 72 MMHG | SYSTOLIC BLOOD PRESSURE: 136 MMHG | OXYGEN SATURATION: 99 %

## 2024-09-24 DIAGNOSIS — E78.5 HYPERLIPIDEMIA, UNSPECIFIED: ICD-10-CM

## 2024-09-24 DIAGNOSIS — K21.9 GASTRO-ESOPHAGEAL REFLUX DISEASE W/OUT ESOPHAGITIS: ICD-10-CM

## 2024-09-24 DIAGNOSIS — I10 ESSENTIAL (PRIMARY) HYPERTENSION: ICD-10-CM

## 2024-09-24 DIAGNOSIS — E11.9 TYPE 2 DIABETES MELLITUS W/OUT COMPLICATIONS: ICD-10-CM

## 2024-09-24 DIAGNOSIS — N18.30 CHRONIC KIDNEY DISEASE, STAGE 3 UNSPECIFIED: ICD-10-CM

## 2024-09-24 DIAGNOSIS — D64.9 ANEMIA, UNSPECIFIED: ICD-10-CM

## 2024-09-24 DIAGNOSIS — I48.91 UNSPECIFIED ATRIAL FIBRILLATION: ICD-10-CM

## 2024-09-24 DIAGNOSIS — Z23 ENCOUNTER FOR IMMUNIZATION: ICD-10-CM

## 2024-09-24 PROCEDURE — G2211 COMPLEX E/M VISIT ADD ON: CPT

## 2024-09-24 PROCEDURE — G0008: CPT

## 2024-09-24 PROCEDURE — 90662 IIV NO PRSV INCREASED AG IM: CPT

## 2024-09-24 PROCEDURE — 99214 OFFICE O/P EST MOD 30 MIN: CPT

## 2024-09-24 NOTE — PHYSICAL EXAM
[Edema] : edema was not present [Normal] : no spinal tenderness [Normal Gait] : normal gait [Involuntary Movements] : no involuntary movements were seen [Normal Color / Pigmentation] : normal skin color and pigmentation [No Focal Deficits] : no focal deficits [Normal Affect] : the affect was normal [de-identified] : No palpable deformity left anterior tibia.  Minimal tenderness to firm palpation over anterior mid tibial region.  No bruising present

## 2024-09-24 NOTE — HISTORY OF PRESENT ILLNESS
[FreeTextEntry1] : First day: take 2 capsules followed by 1 capsule 1 hour later, then take 1 capsule daily until resolution of symptoms [No falls in past year] : Patient reported no falls in the past year [NO] : No [Little interest or pleasure doing things] : 1) Little interest or pleasure doing things [0] : 1) Little interest or pleasure doing things: Not at all (0) [Feeling down, depressed, or hopeless] : 2) Feeling down, depressed, or hopeless [1] : 2) Feeling down, depressed, or hopeless for several days (1) [PHQ-2 Negative - No further assessment needed] : PHQ-2 Negative - No further assessment needed [[id number]] : 1

## 2024-09-25 LAB
ALBUMIN SERPL ELPH-MCNC: 4.5 G/DL
ALP BLD-CCNC: 89 U/L
ALT SERPL-CCNC: 7 U/L
ANION GAP SERPL CALC-SCNC: 14 MMOL/L
AST SERPL-CCNC: 14 U/L
BASOPHILS # BLD AUTO: 0.08 K/UL
BASOPHILS NFR BLD AUTO: 1.4 %
BILIRUB SERPL-MCNC: 0.2 MG/DL
BUN SERPL-MCNC: 96 MG/DL
CALCIUM SERPL-MCNC: 9.7 MG/DL
CHLORIDE SERPL-SCNC: 103 MMOL/L
CO2 SERPL-SCNC: 22 MMOL/L
CREAT SERPL-MCNC: 1.8 MG/DL
EGFR: 28 ML/MIN/1.73M2
EOSINOPHIL # BLD AUTO: 0.13 K/UL
EOSINOPHIL NFR BLD AUTO: 2.3 %
ESTIMATED AVERAGE GLUCOSE: 137 MG/DL
GLUCOSE SERPL-MCNC: 121 MG/DL
HBA1C MFR BLD HPLC: 6.4 %
HCT VFR BLD CALC: 30 %
HGB BLD-MCNC: 9.7 G/DL
IMM GRANULOCYTES NFR BLD AUTO: 0.3 %
IRON SATN MFR SERPL: 21 %
IRON SERPL-MCNC: 62 UG/DL
LYMPHOCYTES # BLD AUTO: 0.84 K/UL
LYMPHOCYTES NFR BLD AUTO: 14.7 %
MAN DIFF?: NORMAL
MCHC RBC-ENTMCNC: 30.1 PG
MCHC RBC-ENTMCNC: 32.3 GM/DL
MCV RBC AUTO: 93.2 FL
MONOCYTES # BLD AUTO: 0.49 K/UL
MONOCYTES NFR BLD AUTO: 8.6 %
NEUTROPHILS # BLD AUTO: 4.16 K/UL
NEUTROPHILS NFR BLD AUTO: 72.7 %
PLATELET # BLD AUTO: 251 K/UL
POTASSIUM SERPL-SCNC: 5.4 MMOL/L
PROT SERPL-MCNC: 6.9 G/DL
RBC # BLD: 3.22 M/UL
RBC # FLD: 11.9 %
SODIUM SERPL-SCNC: 139 MMOL/L
TIBC SERPL-MCNC: 291 UG/DL
UIBC SERPL-MCNC: 229 UG/DL
WBC # FLD AUTO: 5.72 K/UL

## 2024-09-29 ENCOUNTER — NON-APPOINTMENT (OUTPATIENT)
Age: 84
End: 2024-09-29

## 2024-10-03 ENCOUNTER — NON-APPOINTMENT (OUTPATIENT)
Age: 84
End: 2024-10-03

## 2024-10-03 RX ORDER — METHYLPREDNISOLONE 4 MG/1
4 TABLET ORAL
Qty: 1 | Refills: 1 | Status: ACTIVE | COMMUNITY
Start: 2024-10-03 | End: 1900-01-01

## 2024-10-24 ENCOUNTER — APPOINTMENT (OUTPATIENT)
Dept: CARDIOLOGY | Facility: CLINIC | Age: 84
End: 2024-10-24

## 2024-10-31 ENCOUNTER — NON-APPOINTMENT (OUTPATIENT)
Age: 84
End: 2024-10-31

## 2024-10-31 ENCOUNTER — APPOINTMENT (OUTPATIENT)
Dept: CARDIOLOGY | Facility: CLINIC | Age: 84
End: 2024-10-31
Payer: MEDICARE

## 2024-10-31 VITALS
WEIGHT: 174 LBS | OXYGEN SATURATION: 99 % | SYSTOLIC BLOOD PRESSURE: 125 MMHG | HEART RATE: 71 BPM | HEIGHT: 59 IN | DIASTOLIC BLOOD PRESSURE: 71 MMHG | BODY MASS INDEX: 35.08 KG/M2

## 2024-10-31 DIAGNOSIS — I10 ESSENTIAL (PRIMARY) HYPERTENSION: ICD-10-CM

## 2024-10-31 DIAGNOSIS — I48.91 UNSPECIFIED ATRIAL FIBRILLATION: ICD-10-CM

## 2024-10-31 PROCEDURE — 93000 ELECTROCARDIOGRAM COMPLETE: CPT

## 2024-10-31 PROCEDURE — 99214 OFFICE O/P EST MOD 30 MIN: CPT

## 2024-10-31 PROCEDURE — G2211 COMPLEX E/M VISIT ADD ON: CPT

## 2024-11-12 ENCOUNTER — APPOINTMENT (OUTPATIENT)
Dept: NEPHROLOGY | Facility: CLINIC | Age: 84
End: 2024-11-12
Payer: MEDICARE

## 2024-11-12 VITALS
SYSTOLIC BLOOD PRESSURE: 118 MMHG | RESPIRATION RATE: 16 BRPM | HEIGHT: 59 IN | DIASTOLIC BLOOD PRESSURE: 52 MMHG | BODY MASS INDEX: 35.08 KG/M2 | OXYGEN SATURATION: 99 % | HEART RATE: 76 BPM | WEIGHT: 174 LBS

## 2024-11-12 DIAGNOSIS — R13.10 DYSPHAGIA, UNSPECIFIED: ICD-10-CM

## 2024-11-12 DIAGNOSIS — N18.30 CHRONIC KIDNEY DISEASE, STAGE 3 UNSPECIFIED: ICD-10-CM

## 2024-11-12 DIAGNOSIS — E11.9 TYPE 2 DIABETES MELLITUS W/OUT COMPLICATIONS: ICD-10-CM

## 2024-11-12 PROCEDURE — 99213 OFFICE O/P EST LOW 20 MIN: CPT

## 2025-01-07 ENCOUNTER — NON-APPOINTMENT (OUTPATIENT)
Age: 85
End: 2025-01-07

## 2025-01-07 ENCOUNTER — APPOINTMENT (OUTPATIENT)
Dept: NEPHROLOGY | Facility: CLINIC | Age: 85
End: 2025-01-07
Payer: MEDICARE

## 2025-01-07 VITALS
TEMPERATURE: 97.2 F | HEART RATE: 63 BPM | WEIGHT: 178 LBS | OXYGEN SATURATION: 94 % | HEIGHT: 59 IN | BODY MASS INDEX: 35.88 KG/M2 | DIASTOLIC BLOOD PRESSURE: 53 MMHG | SYSTOLIC BLOOD PRESSURE: 130 MMHG

## 2025-01-07 DIAGNOSIS — E11.9 TYPE 2 DIABETES MELLITUS W/OUT COMPLICATIONS: ICD-10-CM

## 2025-01-07 DIAGNOSIS — D50.9 IRON DEFICIENCY ANEMIA, UNSPECIFIED: ICD-10-CM

## 2025-01-07 DIAGNOSIS — I48.91 UNSPECIFIED ATRIAL FIBRILLATION: ICD-10-CM

## 2025-01-07 DIAGNOSIS — M54.41 LUMBAGO WITH SCIATICA, RIGHT SIDE: ICD-10-CM

## 2025-01-07 DIAGNOSIS — G89.29 LUMBAGO WITH SCIATICA, RIGHT SIDE: ICD-10-CM

## 2025-01-07 DIAGNOSIS — N18.30 CHRONIC KIDNEY DISEASE, STAGE 3 UNSPECIFIED: ICD-10-CM

## 2025-01-07 PROCEDURE — 99213 OFFICE O/P EST LOW 20 MIN: CPT

## 2025-01-14 LAB
ALBUMIN SERPL ELPH-MCNC: 4.4 G/DL
ALP BLD-CCNC: 91 U/L
ALT SERPL-CCNC: 7 U/L
ANION GAP SERPL CALC-SCNC: 14 MMOL/L
AST SERPL-CCNC: 11 U/L
BILIRUB SERPL-MCNC: 0.3 MG/DL
BUN SERPL-MCNC: 65 MG/DL
CALCIUM SERPL-MCNC: 9.7 MG/DL
CHLORIDE SERPL-SCNC: 105 MMOL/L
CO2 SERPL-SCNC: 25 MMOL/L
CREAT SERPL-MCNC: 1.65 MG/DL
EGFR: 31 ML/MIN/1.73M2
ESTIMATED AVERAGE GLUCOSE: 120 MG/DL
GLUCOSE SERPL-MCNC: 110 MG/DL
HBA1C MFR BLD HPLC: 5.8 %
HCT VFR BLD CALC: 29.5 %
HGB BLD-MCNC: 9.2 G/DL
IRON SATN MFR SERPL: 17 %
IRON SERPL-MCNC: 42 UG/DL
MCHC RBC-ENTMCNC: 31.2 G/DL
MCHC RBC-ENTMCNC: 31.3 PG
MCV RBC AUTO: 100.3 FL
PLATELET # BLD AUTO: 257 K/UL
POTASSIUM SERPL-SCNC: 5.6 MMOL/L
PROT SERPL-MCNC: 6.5 G/DL
RBC # BLD: 2.94 M/UL
RBC # FLD: 12 %
SODIUM SERPL-SCNC: 144 MMOL/L
TIBC SERPL-MCNC: 242 UG/DL
UIBC SERPL-MCNC: 200 UG/DL
WBC # FLD AUTO: 6.51 K/UL

## 2025-01-24 ENCOUNTER — APPOINTMENT (OUTPATIENT)
Dept: GERIATRICS | Facility: CLINIC | Age: 85
End: 2025-01-24

## 2025-02-17 NOTE — ED ADULT TRIAGE NOTE - WEIGHT IN LBS
~Spine Center Scheduling #[phone].  ~Please call our [facility] Spine Nurse Navigation #[phone] with any questions or concerns about your treatment plan, if symptoms worsen and you would like to be seen urgently, or if you have problems controlling bladder and bowel function.  ~For any future flareups or new symptoms, recommend follow-up in clinic or contact the nurse navigator line.  ~Please note that any My Chart messages may take multiple days for a response due to the high volume of patients seen in clinic.  Anything sent Thursday night or after will be answered the following week when able, as Rosita Waldrop CNP does not work in clinic on Fridays.   ~Rosita Waldrop CNP is at the [facility] on Tuesdays, otherwise primarily at the [facility].       Imaging has been ordered. Radiology will call you to schedule. Please call below if you do not hear from them in the next couple of days.   [facility] Radiology Scheduling  Please call [phone] to schedule your image(s) (select option #1). There are 3 different locations near, see below.   There are imaging options for other locations as well, the imaging schedulers can help with other locations within [place].     [facility]  [address] [id number]    [facility] [place]  [address], [address]   [facility] [facility] [address]    [facility]  [address] [address]     
[phone]

## 2025-03-20 ENCOUNTER — NON-APPOINTMENT (OUTPATIENT)
Age: 85
End: 2025-03-20

## 2025-03-20 ENCOUNTER — APPOINTMENT (OUTPATIENT)
Dept: CARDIOLOGY | Facility: CLINIC | Age: 85
End: 2025-03-20
Payer: MEDICARE

## 2025-03-20 VITALS
DIASTOLIC BLOOD PRESSURE: 64 MMHG | TEMPERATURE: 97.5 F | BODY MASS INDEX: 35.48 KG/M2 | WEIGHT: 176 LBS | HEIGHT: 59 IN | SYSTOLIC BLOOD PRESSURE: 125 MMHG | OXYGEN SATURATION: 95 % | HEART RATE: 86 BPM

## 2025-03-20 DIAGNOSIS — I10 ESSENTIAL (PRIMARY) HYPERTENSION: ICD-10-CM

## 2025-03-20 DIAGNOSIS — R07.89 OTHER CHEST PAIN: ICD-10-CM

## 2025-03-20 DIAGNOSIS — I48.91 UNSPECIFIED ATRIAL FIBRILLATION: ICD-10-CM

## 2025-03-20 PROCEDURE — 99214 OFFICE O/P EST MOD 30 MIN: CPT

## 2025-03-20 PROCEDURE — 93000 ELECTROCARDIOGRAM COMPLETE: CPT

## 2025-03-20 PROCEDURE — G2211 COMPLEX E/M VISIT ADD ON: CPT

## 2025-03-22 PROBLEM — R07.89 ATYPICAL CHEST PAIN: Status: RESOLVED | Noted: 2024-04-03 | Resolved: 2025-03-22

## 2025-04-11 ENCOUNTER — APPOINTMENT (OUTPATIENT)
Dept: NEPHROLOGY | Facility: CLINIC | Age: 85
End: 2025-04-11
Payer: MEDICARE

## 2025-04-11 VITALS
SYSTOLIC BLOOD PRESSURE: 122 MMHG | DIASTOLIC BLOOD PRESSURE: 62 MMHG | OXYGEN SATURATION: 98 % | WEIGHT: 181 LBS | RESPIRATION RATE: 16 BRPM | HEART RATE: 60 BPM | HEIGHT: 59 IN | BODY MASS INDEX: 36.49 KG/M2

## 2025-04-11 DIAGNOSIS — I48.91 UNSPECIFIED ATRIAL FIBRILLATION: ICD-10-CM

## 2025-04-11 DIAGNOSIS — D64.9 ANEMIA, UNSPECIFIED: ICD-10-CM

## 2025-04-11 DIAGNOSIS — N18.30 CHRONIC KIDNEY DISEASE, STAGE 3 UNSPECIFIED: ICD-10-CM

## 2025-04-11 PROCEDURE — 99213 OFFICE O/P EST LOW 20 MIN: CPT

## 2025-04-17 DIAGNOSIS — D50.9 IRON DEFICIENCY ANEMIA, UNSPECIFIED: ICD-10-CM

## 2025-04-17 LAB
ALBUMIN SERPL ELPH-MCNC: 4.2 G/DL
ALP BLD-CCNC: 96 U/L
ALT SERPL-CCNC: 10 U/L
ANION GAP SERPL CALC-SCNC: 15 MMOL/L
AST SERPL-CCNC: 17 U/L
BILIRUB SERPL-MCNC: 0.2 MG/DL
BUN SERPL-MCNC: 69 MG/DL
CALCIUM SERPL-MCNC: 9.1 MG/DL
CHLORIDE SERPL-SCNC: 105 MMOL/L
CHOLEST SERPL-MCNC: 135 MG/DL
CO2 SERPL-SCNC: 23 MMOL/L
CREAT SERPL-MCNC: 2.37 MG/DL
EGFRCR SERPLBLD CKD-EPI 2021: 20 ML/MIN/1.73M2
ESTIMATED AVERAGE GLUCOSE: 134 MG/DL
FERRITIN SERPL-MCNC: 142 NG/ML
FOLATE RBC-MCNC: 1043 NG/ML
GLUCOSE SERPL-MCNC: 152 MG/DL
HBA1C MFR BLD HPLC: 6.3 %
HCT VFR BLD CALC: 27.8 %
HCT VFR BLD CALC: 28.1 %
HDLC SERPL-MCNC: 57 MG/DL
HGB BLD-MCNC: 8.6 G/DL
IRON SATN MFR SERPL: 10 %
IRON SERPL-MCNC: 26 UG/DL
LDLC SERPL-MCNC: 58 MG/DL
MCHC RBC-ENTMCNC: 29.6 PG
MCHC RBC-ENTMCNC: 30.6 G/DL
MCV RBC AUTO: 96.6 FL
NONHDLC SERPL-MCNC: 78 MG/DL
PLATELET # BLD AUTO: 232 K/UL
POTASSIUM SERPL-SCNC: 5.9 MMOL/L
PROT SERPL-MCNC: 6.3 G/DL
RBC # BLD: 2.91 M/UL
RBC # FLD: 11.9 %
SODIUM SERPL-SCNC: 142 MMOL/L
TIBC SERPL-MCNC: 257 UG/DL
TRIGL SERPL-MCNC: 107 MG/DL
UIBC SERPL-MCNC: 231 UG/DL
VIT B12 SERPL-MCNC: 488 PG/ML
WBC # FLD AUTO: 7.27 K/UL

## 2025-04-24 RX ORDER — FERUMOXYTOL 510 MG/17ML
510 INJECTION INTRAVENOUS
Qty: 0 | Refills: 0 | Status: ACTIVE | OUTPATIENT
Start: 2025-04-17

## 2025-04-25 DIAGNOSIS — R05.3 CHRONIC COUGH: ICD-10-CM

## 2025-04-25 RX ORDER — BENZONATATE 100 MG/1
100 CAPSULE ORAL
Qty: 21 | Refills: 2 | Status: ACTIVE | COMMUNITY
Start: 2025-04-25 | End: 1900-01-01

## 2025-04-29 ENCOUNTER — APPOINTMENT (OUTPATIENT)
Dept: RHEUMATOLOGY | Facility: CLINIC | Age: 85
End: 2025-04-29
Payer: MEDICARE

## 2025-04-29 VITALS
OXYGEN SATURATION: 97 % | HEART RATE: 73 BPM | RESPIRATION RATE: 16 BRPM | TEMPERATURE: 98 F | DIASTOLIC BLOOD PRESSURE: 70 MMHG | SYSTOLIC BLOOD PRESSURE: 145 MMHG

## 2025-04-29 VITALS
RESPIRATION RATE: 16 BRPM | HEART RATE: 60 BPM | OXYGEN SATURATION: 95 % | SYSTOLIC BLOOD PRESSURE: 113 MMHG | DIASTOLIC BLOOD PRESSURE: 67 MMHG

## 2025-04-29 PROCEDURE — 96365 THER/PROPH/DIAG IV INF INIT: CPT

## 2025-04-29 RX ORDER — FERUMOXYTOL 510 MG/17ML
510 INJECTION INTRAVENOUS
Qty: 0 | Refills: 0 | Status: COMPLETED
Start: 2025-04-17

## 2025-05-05 ENCOUNTER — APPOINTMENT (OUTPATIENT)
Dept: RHEUMATOLOGY | Facility: CLINIC | Age: 85
End: 2025-05-05
Payer: MEDICARE

## 2025-05-05 VITALS
RESPIRATION RATE: 16 BRPM | DIASTOLIC BLOOD PRESSURE: 62 MMHG | OXYGEN SATURATION: 96 % | HEART RATE: 60 BPM | TEMPERATURE: 97.8 F | SYSTOLIC BLOOD PRESSURE: 156 MMHG

## 2025-05-05 VITALS
SYSTOLIC BLOOD PRESSURE: 130 MMHG | OXYGEN SATURATION: 98 % | RESPIRATION RATE: 16 BRPM | HEART RATE: 81 BPM | DIASTOLIC BLOOD PRESSURE: 70 MMHG

## 2025-05-05 PROCEDURE — 96365 THER/PROPH/DIAG IV INF INIT: CPT

## 2025-05-05 RX ORDER — FERUMOXYTOL 510 MG/17ML
510 INJECTION INTRAVENOUS
Qty: 0 | Refills: 0 | Status: COMPLETED
Start: 2025-04-17

## 2025-07-31 ENCOUNTER — APPOINTMENT (OUTPATIENT)
Dept: CARDIOLOGY | Facility: CLINIC | Age: 85
End: 2025-07-31
Payer: MEDICARE

## 2025-07-31 ENCOUNTER — NON-APPOINTMENT (OUTPATIENT)
Age: 85
End: 2025-07-31

## 2025-07-31 VITALS
WEIGHT: 174 LBS | DIASTOLIC BLOOD PRESSURE: 57 MMHG | TEMPERATURE: 98.2 F | OXYGEN SATURATION: 95 % | BODY MASS INDEX: 35.08 KG/M2 | HEART RATE: 59 BPM | HEIGHT: 59 IN | SYSTOLIC BLOOD PRESSURE: 112 MMHG

## 2025-07-31 DIAGNOSIS — I10 ESSENTIAL (PRIMARY) HYPERTENSION: ICD-10-CM

## 2025-07-31 PROCEDURE — G2211 COMPLEX E/M VISIT ADD ON: CPT

## 2025-07-31 PROCEDURE — 93000 ELECTROCARDIOGRAM COMPLETE: CPT

## 2025-07-31 PROCEDURE — 99214 OFFICE O/P EST MOD 30 MIN: CPT

## 2025-08-01 LAB
ANION GAP SERPL CALC-SCNC: 16 MMOL/L
BUN SERPL-MCNC: 56 MG/DL
CALCIUM SERPL-MCNC: 9.2 MG/DL
CHLORIDE SERPL-SCNC: 104 MMOL/L
CO2 SERPL-SCNC: 22 MMOL/L
CREAT SERPL-MCNC: 1.46 MG/DL
EGFRCR SERPLBLD CKD-EPI 2021: 36 ML/MIN/1.73M2
GLUCOSE SERPL-MCNC: 201 MG/DL
POTASSIUM SERPL-SCNC: 4.9 MMOL/L
SODIUM SERPL-SCNC: 141 MMOL/L

## 2025-08-12 ENCOUNTER — APPOINTMENT (OUTPATIENT)
Dept: NEPHROLOGY | Facility: CLINIC | Age: 85
End: 2025-08-12
Payer: MEDICARE

## 2025-08-12 VITALS
RESPIRATION RATE: 16 BRPM | DIASTOLIC BLOOD PRESSURE: 54 MMHG | OXYGEN SATURATION: 97 % | WEIGHT: 174 LBS | HEIGHT: 59 IN | BODY MASS INDEX: 35.08 KG/M2 | SYSTOLIC BLOOD PRESSURE: 118 MMHG | HEART RATE: 58 BPM

## 2025-08-12 DIAGNOSIS — N18.30 CHRONIC KIDNEY DISEASE, STAGE 3 UNSPECIFIED: ICD-10-CM

## 2025-08-12 DIAGNOSIS — E11.9 TYPE 2 DIABETES MELLITUS W/OUT COMPLICATIONS: ICD-10-CM

## 2025-08-12 DIAGNOSIS — F41.8 OTHER SPECIFIED ANXIETY DISORDERS: ICD-10-CM

## 2025-08-12 DIAGNOSIS — I48.91 UNSPECIFIED ATRIAL FIBRILLATION: ICD-10-CM

## 2025-08-12 PROCEDURE — 99213 OFFICE O/P EST LOW 20 MIN: CPT

## 2025-08-12 RX ORDER — TRAZODONE HYDROCHLORIDE 50 MG/1
50 TABLET ORAL
Qty: 30 | Refills: 3 | Status: ACTIVE | COMMUNITY
Start: 2025-08-12 | End: 1900-01-01

## 2025-08-12 RX ORDER — SEMAGLUTIDE 0.68 MG/ML
2 INJECTION, SOLUTION SUBCUTANEOUS WEEKLY
Qty: 2 | Refills: 3 | Status: ACTIVE | COMMUNITY
Start: 2025-08-12 | End: 1900-01-01

## 2025-09-09 DIAGNOSIS — N18.30 CHRONIC KIDNEY DISEASE, STAGE 3 UNSPECIFIED: ICD-10-CM

## 2025-09-09 DIAGNOSIS — D50.9 IRON DEFICIENCY ANEMIA, UNSPECIFIED: ICD-10-CM

## 2025-09-09 DIAGNOSIS — E87.5 HYPERKALEMIA: ICD-10-CM

## 2025-09-09 DIAGNOSIS — E11.9 TYPE 2 DIABETES MELLITUS W/OUT COMPLICATIONS: ICD-10-CM

## 2025-09-12 LAB
ANION GAP SERPL CALC-SCNC: 15 MMOL/L
BUN SERPL-MCNC: 37 MG/DL
CALCIUM SERPL-MCNC: 9.1 MG/DL
CHLORIDE SERPL-SCNC: 102 MMOL/L
CO2 SERPL-SCNC: 21 MMOL/L
CREAT SERPL-MCNC: 1.47 MG/DL
EGFRCR SERPLBLD CKD-EPI 2021: 35 ML/MIN/1.73M2
GLUCOSE SERPL-MCNC: 109 MG/DL
HCT VFR BLD CALC: 27 %
HGB BLD-MCNC: 9.1 G/DL
MAGNESIUM SERPL-MCNC: 1.5 MG/DL
MCHC RBC-ENTMCNC: 31.5 PG
MCHC RBC-ENTMCNC: 33.7 G/DL
MCV RBC AUTO: 93.4 FL
PLATELET # BLD AUTO: 262 K/UL
POTASSIUM SERPL-SCNC: 4.7 MMOL/L
RBC # BLD: 2.89 M/UL
RBC # FLD: 11.5 %
SODIUM SERPL-SCNC: 139 MMOL/L
WBC # FLD AUTO: 5.14 K/UL